# Patient Record
Sex: MALE | Race: BLACK OR AFRICAN AMERICAN | NOT HISPANIC OR LATINO | Employment: FULL TIME | URBAN - METROPOLITAN AREA
[De-identification: names, ages, dates, MRNs, and addresses within clinical notes are randomized per-mention and may not be internally consistent; named-entity substitution may affect disease eponyms.]

---
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
## 2018-01-10 NOTE — MISCELLANEOUS
Message   Recorded as Task   Date: 05/16/2016 09:38 AM, Created By: Milo Saeed   Task Name: Medical Complaint Callback   Assigned To: [facility] triage,Team   Regarding Patient: Suzy Miguel, Status: In Progress   Comment:   Lynnette Heredia - 16 May 2016 9:38 AM    TASK CREATED  Caller: Braeden Sams, Mother; Medical Complaint; [phone] [facility] Phone)  HOT; [place] Russian;   Misha Specking - 16 May 2016 9:44 AM    TASK IN PROGRESS   FergusonPapaIndigo - 16 May 2016 9:49 AM    TASK EDITED  Raquel Sam  May 5 2000  [id number]  Guardian: [ ]  [address] [facility], [address] [facility]       Complaint:  respiratory congestion  sore throat, feels warm, cough  Duration:   1-2 days   Severity: mild     Comments: No fever  Drinking and voiding well  Alert and less active  PCP: Frantz Dugan  Patient Guardian Would Like: Appointment     PROTOCOL: : Sore Throat - Pediatric Guideline     DISPOSITION: See Today or Tomorrow in [address] [place] [address] child seen     CARE ADVICE:   Appt made for today at pts request  Refused home care advise  Active Problems   1  Acanthosis nigricans (701 2) (L83)  2  ADHD (attention deficit hyperactivity disorder), predominantly hyperactive impulsive type   (314 01) (F90 1)  3  Allergic rhinitis (477 9) (J30 9)  4  Asthma (493 90) (J45 909)  5  Dislocation of proximal interphalangeal joint of left little finger, initial encounter (834 02)   (E82 953V)  6  Eczema (692 9) (L30 9)  7  Gastroesophageal reflux disease (530 81) (K21 9)  8  Gynecomastia, male (611 1) (N62)  9  Injury of little finger (959 5) (S69 90XA)  10  Insulin resistance (277 7) (E88 81)  11  Migraine headache (346 90) (G43 909)  12  Morbid or severe obesity due to excess calories (278 01) (E66 01)  13  Pain of finger of left hand (729 5) (M79 645)  14  Skin rash (782 1) (R21)  15  Stria (701 3) (L90 6)  16  Traumatic rupture of radial collateral ligament (848 9) (S53 20XA)  17   Vitamin D deficiency (268 9) (E55 9)    Current Meds  1  CloNIDine HCl - 0 3 MG Oral Tablet; Therapy: 21HHQ7407 to Recorded  2  Excedrin Migraine 250-250-65 MG Oral Tablet; two tablets PO daily as needed for   migraine headache; Therapy: 59TMR1400 to (Last Rx:31Mar2015)  Requested for: 97BNY2787 Ordered  3  Flovent  MCG/ACT Inhalation Aerosol; Inhale 2 puffs twice daily; Therapy: 24Gfy5459 to (Blanca Marquis)  Requested for: 86TKS1890; Last   Rx:02Nov2015 Ordered  4  Fluticasone Propionate 50 MCG/ACT Nasal Suspension (Flonase); 1 spray each nostril   1-2 times a day; Therapy: 91GXT8370 to (Evaluate:12Jan2016)  Requested for: 77GWQ1568; Last   Rx:79Usc9444; Status: ACTIVE - Renewal Denied Ordered  5  MetFORMIN HCl - 500 MG Oral Tablet; TAKE 2 TABLETS TWICE DAILY WITH MEALS; Therapy: 60Yci0513 to Recorded  6  Montelukast Sodium 10 MG Oral Tablet; TAKE 1 TABLET BY MOUTH ONCE A DAY; Therapy: 39Pcu4552 to (Evaluate:85Wnb7234)  Requested for: 26WFP1034; Last   Rx:27Jan2016; Status: ACTIVE - Renewal Denied Ordered  7  Ranitidine HCl - 300 MG Oral Capsule; TAKE 1 CAPSULE AT BEDTIME; Therapy: 36QLZ8939 to (Evaluate:21Apr2016)  Requested for: 65Xkm7789; Last   Rx:22Mar2016 Ordered  8  Ventolin  (90 Base) MCG/ACT Inhalation Aerosol Solution; INHALE 2 PUFFS   EVERY 4 HOURS AS NEEDED FOR COUGH AND WHEEZING; Therapy: 88IPT2016 to (Leonel Pascual)  Requested for: 21Apr2016; Last   Rx:29Oct2015; Status: ACTIVE - Renewal Denied Ordered  9  Vitamin D3 5000 UNIT Oral Capsule; take 1 capsule daily; Therapy: 63Esh5452 to (Last Rx:15Apr2016)  Requested for: 00Chl4430 Ordered  10  Vyvanse CAPS; Therapy: [phone] to Recorded  11  ZyrTEC Allergy 10 MG Oral Tablet; TAKE 1 TABLET AT BEDTIME; Therapy: 85Qfv8951 to (Evaluate:13Nov2015)  Requested for: 94Qjs8266; Last    Rx:48Gtd6694 Ordered    Allergies   1  Penicillin V Potassium SOLR   2  Animal dander - Cats  3  Animal dander - Dogs  4   Seasonal    Signatures   Electronically signed by : Mazin Rutledge RN; May 16 2016  9:54AM EST                       (Author)    Electronically signed by : Elvin Flynn, [facility]; May 16 2016  1:06PM EST                       (Author)

## 2018-01-11 NOTE — MISCELLANEOUS
Message   Recorded as Task   Date: 07/01/2016 11:15 AM, Created By: Doug Pepper   Task Name: Call Patient with results   Assigned To: Cayla Jacobs   Regarding Patient: Christopher Steele, Status: Active   Comment:    Juan De Souza - 01 Jul 2016 11:15 AM     Patient Phone: [phone]      Taskk to Jodi--US shows hepatosplenomegaly with slightly enlarged portal vein with appropriate direction of flow  Zenons Hussein - 01 Jul 2016 11:31 AM     TASK REASSIGNED: Previously Assigned To Jordan Adame - 06 Jul 2016 8:41 AM     TASK EDITED  SEE OTHER NOTE        Active Problems    1  Acanthosis nigricans (701 2) (L83)   2  Acute pharyngitis (462) (J02 9)   3  ADHD (attention deficit hyperactivity disorder), predominantly hyperactive impulsive type   (314 01) (F90 1)   4  Allergic rhinitis (477 9) (J30 9)   5  Asthma (493 90) (J45 909)   6  Dislocation of proximal interphalangeal joint of left little finger, initial encounter (834 02)   (F36 264A)   7  Eczema (692 9) (L30 9)   8  Gastroesophageal reflux disease (530 81) (K21 9)   9  Gynecomastia, male (611 1) (N62)   10  Injury of little finger (959 5) (S69 90XA)   11  Insulin resistance (277 7) (E88 81)   12  Irregular bowel habits (569 89) (R19 8)   13  Migraine headache (346 90) (G43 909)   14  Morbid or severe obesity due to excess calories (278 01) (E66 01)   15  Pain of finger of left hand (729 5) (M79 645)   16  Skin rash (782 1) (R21)   17  Stria (701 3) (L90 6)   18  Traumatic rupture of radial collateral ligament (848 9) (S53 20XA)   19  Vitamin D deficiency (268 9) (E55 9)    Current Meds   1  Adderall TABS; Therapy: (Recorded:08Jun2016) to Recorded   2  CloNIDine HCl - 0 3 MG Oral Tablet; Therapy: 12HMF0140 to Recorded   3  Excedrin Migraine 250-250-65 MG Oral Tablet; two tablets PO daily as needed for   migraine headache; Therapy: 31IKO0998 to (Last Rx:31Mar2015)  Requested for: 93YMA0154 Ordered   4   Flovent  MCG/ACT Inhalation Aerosol; Inhale 2 puffs twice daily; Therapy: 91Pzv8982 to (Jackelyn Cane)  Requested for: 93QOJ0706; Last   Rx:02Nov2015 Ordered   5  Fluticasone Propionate 50 MCG/ACT Nasal Suspension (Flonase); 1 spray each nostril   1-2 times a day; Therapy: 00QYH4801 to (Evaluate:12Jan2016)  Requested for: 30XHD7012; Last   Rx:15Wzl9573; Status: ACTIVE - Renewal Denied Ordered   6  Montelukast Sodium 10 MG Oral Tablet; TAKE 1 TABLET BY MOUTH ONCE A DAY; Therapy: 34Rml9219 to (Evaluate:06Ils9322)  Requested for: 97FOW0534; Last   Rx:06Jun2016 Ordered   7  Ranitidine HCl - 300 MG Oral Capsule; TAKE 1 CAPSULE AT BEDTIME; Therapy: 12UCF5959 to (Evaluate:21Apr2016)  Requested for: 47QTA0476; Last   Rx:22Mar2016 Ordered   8  Ventolin  (90 Base) MCG/ACT Inhalation Aerosol Solution; INHALE 2 PUFFS   EVERY 4 HOURS AS NEEDED FOR COUGH AND WHEEZING; Therapy: 16GDX8353 to (Jefferson Robledo)  Requested for: 21Apr2016; Last   Rx:29Oct2015; Status: ACTIVE - Renewal Denied Ordered   9  Vitamin D3 5000 UNIT Oral Capsule; take 1 capsule daily; Therapy: 15Apr2016 to (Last Rx:15Apr2016)  Requested for: 23Jun2016 Ordered   10  Vyvanse CAPS; Therapy: ([phone]) to Recorded   11  ZyrTEC Allergy 10 MG Oral Tablet; TAKE 1 TABLET AT BEDTIME; Therapy: 75Iug8806 to (Evaluate:13Nov2015)  Requested for: 99Gsy1620; Last    Rx:16Pxe1484 Ordered    Allergies    1  Penicillin V Potassium SOLR    2  Animal dander - Cats   3  Animal dander - Dogs   4   Seasonal    Signatures   Electronically signed by : Emre Cardona, ; Jul 6 2016  8:42AM EST                       (Author)

## 2018-01-11 NOTE — MISCELLANEOUS
Message   Recorded as Task   Date: 08/19/2016 09:36 AM, Created By: Kalyn Burns   Task Name: Medical Complaint Callback   Assigned To: [facility] triage,Team   Regarding Patient: Nicolas Workman, Status: In Progress   ChikiGracie Lockett - 19 Aug 2016 9:36 AM     TASK CREATED  Caller: Morris Orozco, Mother; Medical Complaint; [phone] [address] LEFT 07/04/2016 AND RETURNED 07/24/2016 AND SINCE HES BEEN HOME HES BEEN HAVING NON STOP DIARRHEA AND MOM SAID IS DEHYDRATED-   Horn,April - 19 Aug 2016 9:37 AM     TASK IN PROGRESS   Horn,April - 19 Aug 2016 9:39 AM     TASK EDITED  Diarrhea since arrival back from [facility] on 7/24/16  Pt  is drinking fluids to stay hydrated  Mom says he is dehydrated  Told mom to bring him to ED in the case he would need an IV/Fluids  Active Problems   1  Acanthosis nigricans (701 2) (L83)  2  Acute pharyngitis (462) (J02 9)  3  ADHD (attention deficit hyperactivity disorder), predominantly hyperactive impulsive type   (314 01) (F90 1)  4  Allergic rhinitis (477 9) (J30 9)  5  Asthma (493 90) (J45 909)  6  Dislocation of proximal interphalangeal joint of left little finger, initial encounter (834 02)   (S47 796P)  7  Eczema (692 9) (L30 9)  8  Gastroesophageal reflux disease (530 81) (K21 9)  9  Gynecomastia, male (611 1) (N62)  10  Injury of little finger (959 5) (S69 90XA)  11  Insulin resistance (277 7) (E88 81)  12  Irregular bowel habits (569 89) (R19 8)  13  Migraine headache (346 90) (G43 909)  14  Morbid or severe obesity due to excess calories (278 01) (E66 01)  15  Pain of finger of left hand (729 5) (M79 645)  16  Skin rash (782 1) (R21)  17  Stria (701 3) (L90 6)  18  Traumatic rupture of radial collateral ligament (848 9) (S53 20XA)  19  Vitamin D deficiency (268 9) (E55 9)    Current Meds  1  Adderall TABS; Therapy: (Recorded:08Jun2016) to Recorded  2  CloNIDine HCl - 0 3 MG Oral Tablet; Therapy: 46IEF8202 to Recorded  3  Excedrin Migraine 250-250-65 MG Oral Tablet; two tablets PO daily as needed for   migraine headache; Therapy: 49XPM3292 to (Last Rx:31Mar2015)  Requested for: 84MGA5812 Ordered  4  Flovent  MCG/ACT Inhalation Aerosol; Inhale 2 puffs twice daily; Therapy: 43Edy6447 to (Micaela Kansas)  Requested for: 63MNO5750; Last   Rx:02Nov2015 Ordered  5  Fluticasone Propionate 50 MCG/ACT Nasal Suspension (Flonase); 1 spray each nostril   1-2 times a day; Therapy: 34MAL2686 to (Evaluate:12Jan2016)  Requested for: 57MDF6282; Last   Rx:80Kkd0140; Status: ACTIVE - Renewal Denied Ordered  6  Montelukast Sodium 10 MG Oral Tablet; TAKE 1 TABLET BY MOUTH ONCE A DAY; Therapy: 53Qzh4296 to (Evaluate:97Kaf7422)  Requested for: 01Zqe3165; Last   Rx:51Szn1413 Ordered  7  Ranitidine HCl - 300 MG Oral Capsule; TAKE 1 CAPSULE AT BEDTIME; Therapy: 96DFS6165 to (Evaluate:80Zzf3928)  Requested for: 25Fnq5914; Last   Rx:12Vod5093 Ordered  8  Ventolin  (90 Base) MCG/ACT Inhalation Aerosol Solution; INHALE 2 PUFFS   EVERY 4 HOURS AS NEEDED FOR COUGH AND WHEEZING; Therapy: 97TVC4441 to (Kyung Barron)  Requested for: 21Apr2016; Last   Rx:29Oct2015; Status: ACTIVE - Renewal Denied Ordered  9  Vitamin D3 5000 UNIT Oral Capsule; take 1 capsule daily; Therapy: 41Qvd8039 to (Last Rx:15Apr2016)  Requested for: 23Jun2016 Ordered  10  Vyvanse CAPS; Therapy: [phone] to Recorded  11  ZyrTEC Allergy 10 MG Oral Tablet; TAKE 1 TABLET AT BEDTIME; Therapy: 44Yhy1818 to (Evaluate:13Nov2015)  Requested for: 62Wjp8685; Last    Rx:64Wav7840 Ordered    Allergies   1  Penicillin V Potassium SOLR   2  Animal dander - Cats  3  Animal dander - Dogs  4   Seasonal    Signatures   Electronically signed by : SHRUTI Quinteros ; Aug 19 2016 10:17AM EST                       (Author)    Electronically signed by : Junior Eugene, ; Aug 22 2016  8:05AM EST                       (Author)

## 2018-01-11 NOTE — RESULT NOTES
Message   Task to [facility]  See me  Verified Results  (1) TISSUE EXAM [id number] 02:28PM Eamon De Souza     Test Name Result Flag Reference   LAB AP CASE REPORT (Report)     Surgical Pathology Report             Case: [id number]                   Authorizing Provider: Cale Anthony MD     Collected:      06/14/2016 1428        Ordering Location:   [address] [place] [address]   Received:      06/15/2016 [address] [facility]                               Pathologist:      Jamaal Giraldo MD                              Specimens:  A) - Stomach, antrum bx                                        B) - Esophagus, esophagus bx   LAB AP FINAL DIAGNOSIS (Report)     A  Gastric antrum biopsy:  - Chronic inactive oxyntic gastritis, negative for curvilinear   Helicobacter pylori, confirmed by immunohistochemical stains, evaluated   with appropriate positive & negative controls  - No atrophy or intestinal metaplasia identified   - No epithelial dysplasia and no evidence of malignancy  B  Esophagus biopsy:  - Non-diagnostic biopsy - specimen consists of a minute portion of mucus   within which detached, benign squamous & a strip of cardiac-type columnar   epithelial cells, without evidence of dysplasia or goblet cell   (intestinal) metaplasia  Few neutrophils & lymphocytes are admixed,   without eosinophils  Interpretation performed at [facility]  Electronically signed by Jamaal Giraldo MD on 6/17/2016 at 11:28 AM   LAB AP SURGICAL ADDITIONAL INFORMATION (Report)     These tests were developed and their performance characteristics   determined by [facility] or [employer]  They may not be cleared or approved by the [place]  Food and   Drug Administration  The FDA has determined that such clearance or   approval is not necessary  These tests are used for clinical purposes     They should not be regarded as investigational or for research  This   laboratory has been approved by [facility], designated as a high-complexity   laboratory and is qualified to perform these tests  LAB AP GROSS DESCRIPTION (Report)     A  The specimen is received in formalin, labeled with the patient's name   and hospital number, and is designated antrum biopsy  The specimen   consists of a single tan soft tissue fragment measuring 0 7 cm  Entirely   submitted  One cassette  B  The specimen is received in formalin, labeled with the patient's name   and hospital number, and is designated esophagus biopsy  The specimen   consists of 2 white-tan soft tissue fragments each measuring 0 1-0 2 cm  Entirely submitted  One cassette  Note: The estimated total formalin fixation time based upon information   provided by the submitting clinician and the standard processing schedule   is 38 0 hours   [facility]    LAB AP CLINICAL INFORMATION      Gastroesophageal reflux disease, Normal antrum   Normal antrum       Signatures   Electronically signed by : SHRUTI Pritchard ; Jun 19 2016  9:18PM EST                       (Author)

## 2018-01-11 NOTE — MISCELLANEOUS
Message   Recorded as Task   Date: 09/06/2016 09:35 AM, Created By: Gregoria Hopson   Task Name: Medical Complaint Callback   Assigned To: Vidya Wei   Regarding Patient: Darwin Freeman, Status: Active   Comment:    Sindhu Oquendo - 06 Sep 2016 9:35 AM     TASK CREATED  Caller: Josee Davis, Mother; Medical Complaint; [phone]  concern pt having diarrhea  please advise  thank you   Cayla Jacobs - 06 Sep 2016 2:06 PM     TASK EDITED   mom states he was here on the 19th and had diarrhea s/s but resolved and now is back the past 2 days and she said it is so bad that he is not sleeping at night and did not go to school today  she said he is taking the probiotic and bentyl but not helping  she said he had a fever yesterday but did not take a temp  see phone call today from pcp  Vidya Wei - 06 Sep 2016 2:29 PM     TASK REPLIED TO: Previously Assigned To Vidya Wei  looks like he no-showed for appt this morning at PCP  If he had a fever then most likely viral   Cayla Jacobs - 06 Sep 2016 5:01 PM     TASK REASSIGNED: Previously Assigned To Cayla Jacobs     HE DID NOT HAVE AN APPT AT PCP  THEY TOLD HIM THEY DID NOT KNOW WHAT TO DO AND TO SEE US  INSTRUCTED MOM THAT IT SOUNDS VIRAL SINCE HE HAD A FEVER  MAKE SURE HE IS HYDRATED AND IF NOT BETTER BY END OF WEEK TO CALL US        Active Problems    1  Abdominal pain, diffuse (789 00) (R10 84)   2  Acanthosis nigricans (701 2) (L83)   3  Acute diarrhea (787 91) (R19 7)   4  Acute pharyngitis (462) (J02 9)   5  ADHD (attention deficit hyperactivity disorder), predominantly hyperactive impulsive type   (314 01) (F90 1)   6  Allergic rhinitis (477 9) (J30 9)   7  Asthma (493 90) (J45 909)   8  Dislocation of proximal interphalangeal joint of left little finger, initial encounter (834 02)   (R54 190S)   9  Eczema (692 9) (L30 9)   10  Gastroesophageal reflux disease (530 81) (K21 9)   11  Gynecomastia, male (611 1) (N62)   15   Hepatosplenomegaly (571 8) (R16 2) 13  Injury of little finger (959 5) (S69 90XA)   14  Insulin resistance (277 7) (E88 81)   15  Irregular bowel habits (569 89) (R19 8)   16  Migraine headache (346 90) (G43 909)   17  Morbid or severe obesity due to excess calories (278 01) (E66 01)   18  Nonalcoholic fatty liver disease (571 8) (K76 0)   19  Pain of finger of left hand (729 5) (M79 645)   20  Skin rash (782 1) (R21)   21  Stria (701 3) (L90 6)   22  Traumatic rupture of radial collateral ligament (848 9) (S53 20XA)   23  Vitamin D deficiency (268 9) (E55 9)    Current Meds   1  Adderall TABS; Therapy: (Recorded:08Jun2016) to Recorded   2  CloNIDine HCl - 0 3 MG Oral Tablet; Therapy: 39KSH7571 to Recorded   3  Dicyclomine HCl - 20 MG Oral Tablet; TAKE 1 TABLET 3 TIMES DAILY AS NEEDED FOR   ABDOMINAL CRAMPING AND PAIN;   Therapy: 36Gda9562 to (Evaluate:22Oct2016)  Requested for: 95Ryx9898; Last   Rx:88Xrv8433 Ordered   4  Excedrin Migraine 250-250-65 MG Oral Tablet; two tablets PO daily as needed for   migraine headache; Therapy: 80FMX6835 to (Last Rx:31Mar2015)  Requested for: 54DPH6226 Ordered   5  Zovonfzo8Kebr Oral Capsule; take 1 tab daily; Therapy: 52LTQ3438 to (Evaluate:18Oct2016)  Requested for: 45JGD1962; Last   Rx:90Zht0802 Ordered   6  Flovent  MCG/ACT Inhalation Aerosol; Inhale 2 puffs twice daily; Therapy: 94Hjx0782 to (Otto Ayala)  Requested for: 23HKJ9195; Last   Rx:02Nov2015 Ordered   7  Fluticasone Propionate 50 MCG/ACT Nasal Suspension (Flonase); 1 spray each nostril   1-2 times a day; Therapy: 64CTY0711 to (Evaluate:12Jan2016)  Requested for: 37QQV6172; Last   Rx:60Iqw0997; Status: ACTIVE - Renewal Denied Ordered   8  Montelukast Sodium 10 MG Oral Tablet; TAKE 1 TABLET BY MOUTH ONCE A DAY; Therapy: 02Onq7202 to (Evaluate:41Odi9357)  Requested for: 54Hgs2142; Last   Rx:19Mxh3058 Ordered   9  Ranitidine HCl - 300 MG Oral Capsule; TAKE 1 CAPSULE AT BEDTIME;    Therapy: 11JHU4273 to (Evaluate:15Lvj3298) Requested for: 86Mkv0018; Last   Rx:76Dfi6432 Ordered   10  Ventolin  (90 Base) MCG/ACT Inhalation Aerosol Solution; INHALE 2 PUFFS    EVERY 4 HOURS AS NEEDED FOR COUGH AND WHEEZING; Therapy: 64USD9564 to (Ples Mcburney)  Requested for: 21Apr2016; Last    Rx:36Ddl5589; Status: ACTIVE - Renewal Denied Ordered   11  Vitamin D3 5000 UNIT Oral Capsule; take 1 capsule daily; Therapy: 37Jev8492 to (Last Rx:15Apr2016)  Requested for: 23Jun2016 Ordered   12  Vyvanse CAPS; Therapy: [phone]) to Recorded   13  ZyrTEC Allergy 10 MG Oral Tablet; TAKE 1 TABLET AT BEDTIME; Therapy: 62Vxi8377 to (Evaluate:13Nov2015)  Requested for: 72Wpb2961; Last    Rx:49Stm0695 Ordered    Allergies    1  Penicillin V Potassium SOLR    2  Animal dander - Cats   3  Animal dander - Dogs   4   Seasonal    Signatures   Electronically signed by : Steph Myles; Sep  7 2016  8:36AM EST                       (Author)

## 2018-01-11 NOTE — MISCELLANEOUS
Message   Recorded as Task   Date: 09/06/2016 08:43 AM, Created By: Jacinto Stewart   Task Name: Medical Complaint Callback   Assigned To: kc claude triage,Team   Regarding Patient: Jeana Mesa, Status: In Progress   Leonverna Merino - 06 Sep 2016 8:43 AM     TASK CREATED  Caller: Zenobia Moreira; Medical Complaint; [phone]  DIARRHEA   Horn,April - 06 Sep 2016 8:47 AM     TASK IN PROGRESS   Horn,April - 06 Sep 2016 8:55 AM     TASK EDITED  Diarrhea since July, after returning home from [place] on July 24  No vomiting or fever  He did notice some blood in stool  Did see GI and diarrhea did get better but is getting worse again  Scheduled appt  in the [facility]  office on Tuesday 9/6/16 at 0950AM       PROTOCOL: : Diarrhea- Pediatric Guideline     DISPOSITION:  Home Care - Mild to moderate diarrhea, probably viral gastroenteritis     CARE ADVICE:       1 REASSURANCE AND EDUCATION: * Most diarrhea is caused by a viral infection of the intestines  * Bacterial infections as a cause of diarrhea are uncommon  * Diarrhea is the bodyway of getting rid of the germs  * Here are some tips on how to keep ahead of fluid losses  1 UNIVERSAL PRECAUTIONS IN ALL COUNTRIES:* Hand washing is the key to preventing the spread of infections  Always wash the hands after any contact with vomit or stools  * Wash hands after using the toilet  * Wash hands before cooking, eating food, handling babies and feeding young children  * Cook all poultry thoroughly  Never serve chicken that is still pink inside  Reason: Undercooked poultry is a common cause of diarrhea  3  MILD DIARRHEA TREATMENT (OVER 3YEAR OLD) - EXTRA FLUIDS AND REGULAR DIET:* Continue regular diet  * Eat more starchy foods (e g , cereal, crackers, rice)  * Drink more fluids  Milk is a good choice for mild diarrhea  (Exception: avoid all fruit juices and soft drinks because they make diarrhea worse)  (Reason: high osmotic load)     7 FREQUENT, WATERY DIARRHEA IN OLDER CHILDREN (OVER 3YEAR OLD) - GIVE MORE FLUIDS: * FLUIDS: Offer unlimited fluids  If taking solids, give water or half-strength Gatorade  If refuses solids, give milk or formula  * Avoid all fruit juices and soft drinks  (Reason: makes diarrhea worse)* ORS is rarely needed, but for severe diarrhea, also give 4-8 ounces (120-240 ml) of ORS after every large watery stool  ORS is an Oral Rehydration Solution  Sarah special fluid that can help your child stay hydrated  You can use Pedialyte or the store brand  It can be bought in food or drug stores  * SOLIDS: Starchy foods are absorbed best  Give dried cereals, oatmeal, bread, crackers, noodles, mashed potatoes, rice, etc  Pretzels or salty crackers can help meet sodium needs  Return to normal diet in 24 hours  9 PROBIOTICS:* Probiotics contain healthy bacteria (Lactobacilli) that can replace unhealthy bacteria in the GI tract  * YOGURT in the easiest source of probiotics  If over 12 months, give 2 to 6 ounces (60 to 180 ml) of yogurt twice daily  (Note: Today, almost all yogurts are cultureYogurts that are lactose-free may be even more helpful  * Probiotic supplements in liquids, granules, tablets or capsules are also available in health food stores  13  EXPECTED COURSE:* Viral diarrhea lasts 5-14 days  * Severe diarrhea only occurs on the first 1 or 2 days, but loose stools can persist for 1 to 2 weeks  14  CALL BACK IF:* Signs of dehydration occur* Blood appears in the stool* Diarrhea persists over 2 weeks* Your child becomes worse   Spoke with patient  He will call  to schedule an appt  and go from there  1        1 Amended By: Jack Khoury, April; Sep 06 2016 9:15 AM EST    Active Problems   1  Abdominal pain, diffuse (789 00) (R10 84)  2  Acanthosis nigricans (701 2) (L83)  3  Acute diarrhea (787 91) (R19 7)  4  Acute pharyngitis (462) (J02 9)  5  ADHD (attention deficit hyperactivity disorder), predominantly hyperactive impulsive type   (314 01) (F90 1)  6   Allergic rhinitis (477 9) (J30 9)  7  Asthma (493 90) (J45 909)  8  Dislocation of proximal interphalangeal joint of left little finger, initial encounter (834 02)   (Y64 361Z)  9  Eczema (692 9) (L30 9)  10  Gastroesophageal reflux disease (530 81) (K21 9)  11  Gynecomastia, male (611 1) (N62)  15  Hepatosplenomegaly (571 8) (R16 2)  13  Injury of little finger (959 5) (S69 90XA)  14  Insulin resistance (277 7) (E88 81)  15  Irregular bowel habits (569 89) (R19 8)  16  Migraine headache (346 90) (G43 909)  17  Morbid or severe obesity due to excess calories (278 01) (E66 01)  18  Nonalcoholic fatty liver disease (571 8) (K76 0)  19  Pain of finger of left hand (729 5) (M79 645)  20  Skin rash (782 1) (R21)  21  Stria (701 3) (L90 6)  22  Traumatic rupture of radial collateral ligament (848 9) (S53 20XA)  23  Vitamin D deficiency (268 9) (E55 9)    Current Meds  1  Adderall TABS; Therapy: (Recorded:08Jun2016) to Recorded  2  CloNIDine HCl - 0 3 MG Oral Tablet; Therapy: 64QMG6543 to Recorded  3  Dicyclomine HCl - 20 MG Oral Tablet; TAKE 1 TABLET 3 TIMES DAILY AS NEEDED FOR   ABDOMINAL CRAMPING AND PAIN;   Therapy: 10Wlo5137 to (Evaluate:22Oct2016)  Requested for: 45Fte6036; Last   Rx:89Hka6896 Ordered  4  Excedrin Migraine 250-250-65 MG Oral Tablet; two tablets PO daily as needed for   migraine headache; Therapy: 79PJZ7608 to (Last Rx:31Mar2015)  Requested for: 34AWE3531 Ordered  5  Golvaekj2Dtjg Oral Capsule; take 1 tab daily; Therapy: 66OVF9290 to (Evaluate:18Oct2016)  Requested for: 82GKA2025; Last   Rx:26Tfj9512 Ordered  6  Flovent  MCG/ACT Inhalation Aerosol; Inhale 2 puffs twice daily; Therapy: 90Mke3368 to ([facility])  Requested for: 55JUZ2847; Last   Rx:02Nov2015 Ordered  7  Fluticasone Propionate 50 MCG/ACT Nasal Suspension (Flonase); 1 spray each nostril   1-2 times a day;    Therapy: 70WYB8082 to (Evaluate:12Jan2016)  Requested for: 39QYD2563; Last   Rx:14Sep2015; Status: ACTIVE - Renewal Denied Ordered  8  Montelukast Sodium 10 MG Oral Tablet; TAKE 1 TABLET BY MOUTH ONCE A DAY; Therapy: 51Tez7883 to (Evaluate:98Uir6925)  Requested for: 18Mdj7587; Last   Rx:98Kee7042 Ordered  9  Ranitidine HCl - 300 MG Oral Capsule; TAKE 1 CAPSULE AT BEDTIME; Therapy: 06RYG7562 to (Evaluate:24Tsi0353)  Requested for: 79YJM5891; Last   Rx:08Psu8108 Ordered  10  Ventolin  (90 Base) MCG/ACT Inhalation Aerosol Solution; INHALE 2 PUFFS    EVERY 4 HOURS AS NEEDED FOR COUGH AND WHEEZING; Therapy: 54KZF0544 to (Grace Banegas)  Requested for: 05Yvj1598; Last    Rx:91Xjo9283; Status: ACTIVE - Renewal Denied Ordered  11  Vitamin D3 5000 UNIT Oral Capsule; take 1 capsule daily; Therapy: 86Ndv9200 to (Last Rx:74Nab0316)  Requested for: 22Ruv4305 Ordered  12  Vyvanse CAPS; Therapy: (256 3408) to Recorded  13  ZyrTEC Allergy 10 MG Oral Tablet; TAKE 1 TABLET AT BEDTIME; Therapy: 49Jmf8178 to (Evaluate:58Psl5427)  Requested for: 11Dhf2278; Last    Rx:69Cpf3281 Ordered    Allergies   1  Penicillin V Potassium SOLR   2  Animal dander - Cats  3  Animal dander - Dogs  4   Seasonal    Signatures   Electronically signed by : Tayo Smith, ; Sep  6 2016  9:16AM EST                       (Author)

## 2018-01-12 NOTE — MISCELLANEOUS
Message   Recorded as Task   Date: 07/05/2016 12:59 PM, Created By: Felicity Alvarenga   Task Name: Call Patient with results   Assigned To: Cayla Jacobs   Regarding Patient: Judi Marquez, Status: Active   Comment:    Juan De Souza - 05 Jul 2016 12:59 PM     Patient Phone: [phone]      Task to Priscila Baba MCKEON shouws hepatosplenomegaly but no portal hypertension  Severo Ivanolilibeth - 05 Jul 2016 1:51 PM     TASK REASSIGNED: Previously Assigned To Akua Montero - 05 Jul 2016 2:58 PM     TASK REASSIGNED: Previously Assigned To Cayla Jacobs  Is there any concern to relay to mom when I call with results? Juan De Souza - 05 Jul 2016 8:38 PM     TASK REPLIED TO: Previously Assigned To Juan De Souza   This is reassuring  Cayla Jacobs - 06 Jul 2016 8:41 AM     TASK EDITED  LM FOR PT TO RETURN Steve Howell - 07 Jul 2016 9:57 AM     TASK EDITED  patient in [facility]  Aware of US results        Active Problems    1  Acanthosis nigricans (701 2) (L83)   2  Acute pharyngitis (462) (J02 9)   3  ADHD (attention deficit hyperactivity disorder), predominantly hyperactive impulsive type   (314 01) (F90 1)   4  Allergic rhinitis (477 9) (J30 9)   5  Asthma (493 90) (J45 909)   6  Dislocation of proximal interphalangeal joint of left little finger, initial encounter (834 02)   (S09 768E)   7  Eczema (692 9) (L30 9)   8  Gastroesophageal reflux disease (530 81) (K21 9)   9  Gynecomastia, male (611 1) (N62)   10  Injury of little finger (959 5) (S69 90XA)   11  Insulin resistance (277 7) (E88 81)   12  Irregular bowel habits (569 89) (R19 8)   13  Migraine headache (346 90) (G43 909)   14  Morbid or severe obesity due to excess calories (278 01) (E66 01)   15  Pain of finger of left hand (729 5) (M79 645)   16  Skin rash (782 1) (R21)   17  Stria (701 3) (L90 6)   18  Traumatic rupture of radial collateral ligament (848 9) (S53 20XA)   19   Vitamin D deficiency (268 9) (E55 9)    Current Meds   1  Adderall TABS; Therapy: (Recorded:08Jun2016) to Recorded   2  CloNIDine HCl - 0 3 MG Oral Tablet; Therapy: 66BKW9662 to Recorded   3  Excedrin Migraine 250-250-65 MG Oral Tablet; two tablets PO daily as needed for   migraine headache; Therapy: 50OEU4776 to (Last Rx:31Mar2015)  Requested for: 42UOG0138 Ordered   4  Flovent  MCG/ACT Inhalation Aerosol; Inhale 2 puffs twice daily; Therapy: 32Vlz4639 to (Jeffery Gloria)  Requested for: 66KJJ4931; Last   Rx:02Nov2015 Ordered   5  Fluticasone Propionate 50 MCG/ACT Nasal Suspension (Flonase); 1 spray each nostril   1-2 times a day; Therapy: 98PWU6253 to (Evaluate:12Jan2016)  Requested for: 74AJX8884; Last   Rx:82Nme5519; Status: ACTIVE - Renewal Denied Ordered   6  Montelukast Sodium 10 MG Oral Tablet; TAKE 1 TABLET BY MOUTH ONCE A DAY; Therapy: 23Afv1395 to (Evaluate:61Wys9917)  Requested for: 87BRI1765; Last   Rx:06Jun2016 Ordered   7  Ranitidine HCl - 300 MG Oral Capsule; TAKE 1 CAPSULE AT BEDTIME; Therapy: 32UEZ8849 to (Evaluate:21Apr2016)  Requested for: 81WIJ5747; Last   Rx:22Mar2016 Ordered   8  Ventolin  (90 Base) MCG/ACT Inhalation Aerosol Solution; INHALE 2 PUFFS   EVERY 4 HOURS AS NEEDED FOR COUGH AND WHEEZING; Therapy: 63YFK0974 to (Courtney Shields)  Requested for: 21Apr2016; Last   Rx:29Oct2015; Status: ACTIVE - Renewal Denied Ordered   9  Vitamin D3 5000 UNIT Oral Capsule; take 1 capsule daily; Therapy: 52Zrp0812 to (Last Rx:15Apr2016)  Requested for: 23Jun2016 Ordered   10  Vyvanse CAPS; Therapy: (159 980 591) to Recorded   11  ZyrTEC Allergy 10 MG Oral Tablet; TAKE 1 TABLET AT BEDTIME; Therapy: 63Iiq7874 to (Evaluate:13Nov2015)  Requested for: 69Bog4479; Last    Rx:33Ewj7255 Ordered    Allergies    1  Penicillin V Potassium SOLR    2  Animal dander - Cats   3  Animal dander - Dogs   4   Seasonal    Signatures   Electronically signed by : Breana Pavon, ; Jul 7 2016  9:57AM EST (Author)

## 2018-01-12 NOTE — CONSULTS
I had the pleasure of evaluating your patient, dArian Powers  My full evaluation follows:      Chief Complaint  Obesity, insulin resistance, enlarged liver      History of Present Illness  Christal is a 68-year-old who was seen in follow-up after a four-month interval for her obesity, hepatosplenomegaly with fatty liver, esophageal reflux, and hyperinsulinemia  Over the past 4 months he has been a limited excess sugars from the diet and has tried to exercise  His abdominal ultrasound revealed improvement with decreased size of the liver  His cholesterol and triglycerides were normal as was his hemoglobin A1c and thyroid studies  His insulin however was 92 8  Today we discussed that his pancreas is really working over time to keep his sugars and check  He reported that his mother has type 2 diabetes and there are a lot of grandparents with diabetes as well  He has 2 grandparents that became blind due to the diabetes, one has passed away  I recommended that he follow up with endocrinology  He has continued to take ranitidine and dicyclomine and has been feeling quite well recently  Review of Systems    Constitutional: recent 4 lb weight gain, but as noted in HPI, not feeling poorly and not feeling tired  ENT: no nasal discharge  Cardiovascular: no chest pain and the heart rate was not fast    Respiratory: no cough  Gastrointestinal: Occasional stomach cramping, but as noted in HPI, no abdominal pain, no nausea, no vomiting, no constipation and no diarrhea  Neurological: no headache  Other Symptoms: Graduated high school and 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  ROS reviewed  Active Problems    1  Acanthosis nigricans (701 2) (L83)   2  ADHD (attention deficit hyperactivity disorder), predominantly hyperactive impulsive type   (314 01) (F90 1)   3  Allergic rhinitis (477 9) (J30 9)   4  Asthma (493 90) (J45 909)   5  Back pain (724 5) (M54 9)   6  Daytime somnolence (780 54) (R40 0)   7  Eczema (692 9) (L30 9)   8  Encounter for screening examination for sexually transmitted disease (V74 5) (Z11 3)   9  Excessive cerumen in both ear canals (380 4) (H61 23)   10  Gastroesophageal reflux disease (530 81) (K21 9)   11  Gynecomastia, male (611 1) (N62)   15  Hepatosplenomegaly (571 8) (R16 2)   13  Hyperinsulinemia (251 1) (E16 1)   14  Insulin resistance (277 7) (E88 81)   15  Irregular bowel habits (569 89) (R19 8)   16  Irritable bowel syndrome with diarrhea (564 1) (K58 0)   17  Migraine, unspecified, not intractable, without status migrainosus (346 90) (G43 909)   18  Morbid or severe obesity due to excess calories (278 01) (E66 01)   19  Nonalcoholic fatty liver disease (571 8) (K76 0)   20  Prediabetes (790 29) (R73 09)   21  Skin rash (782 1) (R21)   22  Snoring (786 09) (R06 83)   23  Stria (701 3) (L90 6)   24   Vitamin D deficiency (268 9) (E55 9)    Past Medical History    · History of Abdominal abscess (567 22) (K65 1)   · History of Abdominal pain, diffuse (789 00) (R10 84)   · History of Acute diarrhea (787 91) (R19 7)   · Asthma (493 90) (J45 909)   · History of Birth History   · History of Cough (786 2) (R05)   · History of Dislocation of proximal interphalangeal joint of left little finger, initial encounter  (834 02) (Q50 984V)   · History of Headache (784 0) (R51)   · History of acute pharyngitis (V12 69) (Z87 09)   · History of allergy (V15 09) (Z88 9)   · History of upper respiratory infection (V12 09) (Z87 09)   · History of Injury of little finger (959 5) (S69 90XA)   · History of Pain of finger of left hand (729 5) (M79 645)   · History of Traumatic rupture of radial collateral ligament (848 9) (S53 20XA)   · History of Umbilical hernia (405 4) (K42 9)   · History of Vomiting (787 03) (R11 10)    Surgical History    · History of Elective Circumcision   · History of Esophagogastroduodenoscopy With Biopsy   · History of Tonsillectomy With Adenoidectomy   · History of Umbilical Hernia Repair    Family History    · Family history of Asthma (V17 5)   · Family history of Diabetes Mellitus (V18 0)   · Family history of migraine headaches (V17 2) (Z82 0)   · Family history of Gastric Surgery For Morbid Obesity Gastric Bypass   · Family history of Glaucoma (V19 11)   · Family history of Hypertension (V17 49)    · Family history of Blindness due to type 2 diabetes mellitus   · Family history of Type 2 Diabetes Mellitus    · Family history of Type 2 Diabetes Mellitus    · Family history of cataracts (V19 19) (Z83 518)   · Family history of Primary cervical cancer    Social History    · Denied: History of Alcohol Use (History)   · Caffeine Use   · Cultural Background  (___ %)   · Currently In School   · Denied: History of Drug Use   · Lives with mother (single parent)   · Marital History - Single   · Never A Smoker   · No alcohol use   · Non-smoker (V49 89) (Z78 9)   · Preferred Language English  The social history was reviewed and updated today  Current Meds   1  CloNIDine HCl - 0 3 MG Oral Tablet; Therapy: 13FNT1924 to Recorded   2  Dicyclomine HCl - 20 MG Oral Tablet; TAKE 1 TABLET 3 TIMES DAILY AS NEEDED FOR   ABDOMINAL CRAMPING AND PAIN;   Therapy: 79Axa6868 to (Evaluate:11Aug2017)  Requested for: 37Wbj2844; Last   Rx:82Dtq6098 Ordered   3  Excedrin Migraine 250-250-65 MG Oral Tablet; two tablets PO daily as needed for   migraine headache; Therapy: 14PRL8389 to (Last Rx:31Mar2015)  Requested for: 65SIO7173 Ordered   4  Flovent  MCG/ACT Inhalation Aerosol; Inhale 2 puffs twice daily; Therapy: 48Fnr4047 to (Pancho Licona)  Requested for: 25TIN8110; Last   Rx:02Hdx5037 Ordered   5  Fluticasone Propionate 50 MCG/ACT Nasal Suspension; 1 spray each nostril 1-2 times   a day; Therapy: 25FPK4954 to (Evaluate:24Jan2017)  Requested for: 29ENB6022; Last   Rx:12Osm8041 Ordered   6   Montelukast Sodium 10 MG Oral Tablet; TAKE 1 TABLET BY MOUTH ONCE A DAY; Therapy: 54Sdy5503 to (Evaluate:67Xeb9496)  Requested for: 58Sfm3534; Last   Rx:12Wbj3474 Ordered   7  RaNITidine HCl - 300 MG Oral Capsule; TAKE 1 CAPSULE AT BEDTIME, may take   another in morning as needed for heartburn; Therapy: 96DJG7623 to ([place] Cool)  Requested for: 73TQU7386; Last   Rx:34Amp7867 Ordered   8  Ventolin  (90 Base) MCG/ACT Inhalation Aerosol Solution; INHALE 2 PUFFS   EVERY 4 HOURS AS NEEDED FOR COUGH AND WHEEZING; Therapy: 76WXP5922 to (Evaluate:23Ilj0973)  Requested for: 96IVM7148; Last   Rx:58Ovg8100 Ordered   9  Vitamin D3 5000 UNIT Oral Capsule; take 1 capsule daily; Therapy: 16Ltn5474 to (Last Rx:15Qsd5270)  Requested for: 86Jlx4918 Ordered   10  Vyvanse 30 MG Oral Capsule; Therapy: (Recorded:36Pog9892) to Recorded   11  ZyrTEC Allergy 10 MG Oral Tablet; TAKE 1 TABLET AT BEDTIME; Therapy: 17Igi2706 to (Evaluate:58Aiu0125)  Requested for: 46Cax2124; Last    Rx:81Yul6941 Ordered    The medication list was reviewed and updated today  Allergies    1  Penicillin V Potassium SOLR    2  Animal dander - Cats   3  Animal dander - Dogs   4  Seasonal    Vitals   Recorded: 13Sep2017 02:41PM   Heart Rate 88   Systolic 701   Diastolic 72   Height 961 9 cm   Weight 145 3 kg   BMI Calculated 42 18   BSA Calculated 2 63   BMI Percentile 99 %   2-20 Stature Percentile 89 %   2-20 Weight Percentile 99 %     Physical Exam    Constitutional   General appearance: Abnormal   morbidly obese  Eyes   Conjunctiva and lids: No swelling, erythema, or discharge  Pupils and irises: Equal, round and reactive to light  Ears, Nose, Mouth, and Throat   External inspection of ears and nose: Normal     Nasal mucosa, septum, and turbinates: Normal without edema or erythema  Pulmonary   Respiratory effort: No increased work of breathing or signs of respiratory distress  Auscultation of lungs: Clear to auscultation      Cardiovascular   Auscultation of heart: Normal rate and rhythm, normal S1 and S2, without murmurs  Abdomen   Abdomen: Abnormal   The abdomen was rounded and obese  The abdomen was soft and nontender  Liver and spleen: No hepatomegaly or splenomegaly  Skin   Skin and subcutaneous tissue: Normal without rashes or lesions  Psychiatric   Orientation to person, place and time: Normal     Mood and affect: Normal     Chest - Chest: Normal       Assessment    1  Gastroesophageal reflux disease (530 81) (K21 9)   2  Hepatosplenomegaly (571 8) (R16 2)   3  Hyperinsulinemia (251 1) (E16 1)   4  Irritable bowel syndrome with diarrhea (564 1) (K58 0)   5  Nonalcoholic fatty liver disease (571 8) (K76 0)   6  Morbid or severe obesity due to excess calories (278 01) (E66 01)   7  Family history of Blindness due to type 2 diabetes mellitus : Maternal Grandfather    Plan  Gastroesophageal reflux disease    · RaNITidine HCl - 300 MG Oral Capsule; TAKE 1 CAPSULE AT BEDTIME, may  take another in morning as needed for heartburn   Rx By: David Cost; Dispense: 30 Days ; #:60 Capsule; Refill: 3; For: Gastroesophageal reflux disease; ANTONY = N; [facility] [place] [address] ; Last Updated By: System, [employer]; 9/13/2017 3:34:45 PM  PMH: Abdominal pain, diffuse, PMH: Acute diarrhea    · Dicyclomine HCl - 20 MG Oral Tablet; Take 1 tablet 3 times a day ONLY AS  NEEDED for abdominal cramping and pain   Rx By: David Cost; Dispense: 30 Days ; #:1 X 90 Tablet Bottle; Refill: 3; For: PMH: Abdominal pain, diffuse, PMH: Acute diarrhea; ANTONY = N; [facility] [place] [address] ; Last Updated By: System, [employer]; 9/13/2017 3:34:47 PM                          The patients parent/guardian was given the following special diet instructions for: Other Elimination Diets    Continue a healthy diet eating plenty of fruits and vegetables   Continue to drink water and flavored water and consume nonfat dairy; avoid fried food and fast food when possible  Discussion/Summary    Christal's hepatosplenomegaly is improving as evidenced by recent ultrasound  His cholesterol and triglycerides are within normal limits  His hemoglobin A1c, glucose level, thyroid studies, and blood count are also within normal limits  His esophageal reflux and irritable bowel syndrome have been well-controlled  We have asked him to continue using ranitidine once a day in the evening  He may use a second dose 12 hours later if needed  Today we'd like him to stop the regular use of dicyclomine and only use it as needed if he has abdominal cramping or loose stools  His insulin level was elevated to 92 8 and we have asked him to make a follow-up appointment with Dr Tanya Moise in pediatric endocrinology to discuss this further  We would like to see him back in 4 months for reassessment  The patient, patient's family was counseled regarding diagnostic results, instructions for management, risk factor reductions, prognosis, patient and family education, risks and benefits of treatment options, importance of compliance with treatment  30 minutes was spent counseling  Patient is able to Self-Care  Patient agrees and allows to involve family/caregiver in development of care plan:   Possible side effects of new medications were reviewed with the patient/guardian today  The treatment plan was reviewed with the patient/guardian  The patient/guardian understands and agrees with the treatment plan      Thank you very much for allowing me to participate in the care of this patient  If you have any questions, please do not hesitate to contact me        Future Appointments    Signatures   Electronically signed by : Vale Valdovinos; Sep 13 2017  3:33PM EST                       (Author)    Electronically signed by : Tod Boas, M D ; Sep 13 2017  4:01PM EST                       (Author)

## 2018-01-12 NOTE — RESULT NOTES
Message      Task to Oskar Garzon  US shows hepatosplenomegaly but no portal hypertension  Verified Results  US DUPLEX AR/VN ABD,PELVIS LIMITED [id number] 08:52AM Helen Alfaro Order Number: [id number]   Performing Comments: Please assess vessels by Doppler to R/O portal hypertension   - Patient Instructions: To schedule this appointment, please contact Central Scheduling at [phone]  Test Name Result Flag Reference   US DUPLEX AR/VN ABD,PELVIS LIMITED (Report)     ABDOMEN ULTRASOUND, COMPLETE     INDICATION: Prominent vessels seen on EGD  Evaluate for portal hypertension  COMPARISON: None     TECHNIQUE:  Real-time ultrasound of the abdomen was performed with a curvilinear transducer with both volumetric sweeps and still imaging techniques  FINDINGS:     PANCREAS: Visualized portions of the pancreas are within normal limits  AORTA AND IVC: Visualized portions are normal for patient age  LIVER:   Size: Enlarged  The liver measures 21 2 cm in the midclavicular line  Contour: Surface contour is smooth  Parenchyma: Borderline increased echogenicity right lobe which may reflect fatty infiltration  No evidence of suspicious mass  The main portal vein is patent and hepatopetal  Portal vein is top normal caliber measuring 13 mm  Velocity in the portal vein measures 21 0 cm/s  There is appropriate directional flow within the visualized intrahepatic portal veins  The hepatic    veins are patent  BILIARY:   The gallbladder is normal in caliber  No wall thickening or pericholecystic fluid  No stones or sludge identified  Sonographic Rulon Cheek sign is negative  No intrahepatic biliary dilatation  CBD measures 4 mm  No choledocholithiasis  KIDNEY:    Right kidney measures 12 3 x 4 4 cm  Within normal limits  Left kidney measures 12 6 x 7 3 cm  Within normal limits  SPLEEN:    Measures 13 7 cm  Enlarged  No focal lesions  ASCITES: None  IMPRESSION:     Hepatosplenomegaly with possible mild fatty infiltration right lobe  No focal hepatic lesions  Portal vein is top normal caliber with hepatopedal flow  Otherwise, unremarkable abdominal ultrasound         Workstation performed: [id number]     Signed by:   Jose Cote DO   7/5/16       Signatures   Electronically signed by : SHRUTI Loera ; Jul 5 2016 12:59PM EST                       (Author)

## 2018-01-13 VITALS
HEIGHT: 73 IN | TEMPERATURE: 98 F | BODY MASS INDEX: 41.75 KG/M2 | DIASTOLIC BLOOD PRESSURE: 80 MMHG | SYSTOLIC BLOOD PRESSURE: 130 MMHG | WEIGHT: 315 LBS | HEART RATE: 84 BPM

## 2018-01-13 NOTE — MISCELLANEOUS
Message   Recorded as Task   Date: 09/07/2016 10:46 AM, Created By: Bib Roy   Task Name: Medical Complaint Callback   Assigned To: wesly arce triage,Team   Regarding Patient: Neymar Norman, Status: In Progress   Comment:    Shoneberger,Courtney - 07 Sep 2016 10:46 AM     TASK CREATED  Caller: remi, Mother; Medical Complaint; [phone]  [place] pt  still having loose stools  spoke with the nurse yesterday and nothing is helping  mom wants him seen   Figueroa Pintoi - 07 Sep 2016 10:47 AM     TASK IN PROGRESS   Daniella Marion - 07 Sep 2016 10:59 AM     TASK EDITED              2nd day of loose stools and cant go to school  Temp 101  Stomach pain on the left side  Blood in stools  Was in [facility] June 4 -July 24th  Taking Bentyl and ADHD MEDS  Dr Recinos Older office would not see him they said it was viral  Mom states "I need some help here"  He is not sleeping at night due to diarrhea  Stool samples done-neg  [place] was [place]  Drinking and urinating OK  aPT  1130A GIVEN TODAY        Active Problems   1  Abdominal pain, diffuse (789 00) (R10 84)  2  Acanthosis nigricans (701 2) (L83)  3  Acute diarrhea (787 91) (R19 7)  4  Acute pharyngitis (462) (J02 9)  5  ADHD (attention deficit hyperactivity disorder), predominantly hyperactive impulsive type   (314 01) (F90 1)  6  Allergic rhinitis (477 9) (J30 9)  7  Asthma (493 90) (J45 909)  8  Dislocation of proximal interphalangeal joint of left little finger, initial encounter (834 02)   (B98 006S)  9  Eczema (692 9) (L30 9)  10  Gastroesophageal reflux disease (530 81) (K21 9)  11  Gynecomastia, male (611 1) (N62)  15  Hepatosplenomegaly (571 8) (R16 2)  13  Injury of little finger (959 5) (S69 90XA)  14  Insulin resistance (277 7) (E88 81)  15  Irregular bowel habits (569 89) (R19 8)  16  Migraine headache (346 90) (G43 909)  17  Morbid or severe obesity due to excess calories (278 01) (E66 01)  18   Nonalcoholic fatty liver disease (571 8) (K76 0)  19  Pain of finger of left hand (729 5) (M79 645)  20  Skin rash (782 1) (R21)  21  Stria (701 3) (L90 6)  22  Traumatic rupture of radial collateral ligament (848 9) (S53 20XA)  23  Vitamin D deficiency (268 9) (E55 9)    Current Meds  1  Adderall TABS; Therapy: (Recorded:08Jun2016) to Recorded  2  CloNIDine HCl - 0 3 MG Oral Tablet; Therapy: 67CNG1458 to Recorded  3  Dicyclomine HCl - 20 MG Oral Tablet; TAKE 1 TABLET 3 TIMES DAILY AS NEEDED FOR   ABDOMINAL CRAMPING AND PAIN;   Therapy: 16Wwa9039 to (Evaluate:22Oct2016)  Requested for: 98Fmd6214; Last   Rx:15Vzr1524 Ordered  4  Excedrin Migraine 250-250-65 MG Oral Tablet; two tablets PO daily as needed for   migraine headache; Therapy: 72VOR9823 to (Last Rx:31Mar2015)  Requested for: 41OWC8401 Ordered  5  Iqcolurd9Llhp Oral Capsule; take 1 tab daily; Therapy: 20ZWC7724 to (Evaluate:18Oct2016)  Requested for: 39JLT4904; Last   Rx:19Lpp4242 Ordered  6  Flovent  MCG/ACT Inhalation Aerosol; Inhale 2 puffs twice daily; Therapy: 52Tuy2616 to (Serina Bassett)  Requested for: 12ICE9268; Last   Rx:02Nov2015 Ordered  7  Fluticasone Propionate 50 MCG/ACT Nasal Suspension (Flonase); 1 spray each nostril   1-2 times a day; Therapy: 31HNY0143 to (Evaluate:12Jan2016)  Requested for: 91AYL1225; Last   Rx:74Vdp5205; Status: ACTIVE - Renewal Denied Ordered  8  Montelukast Sodium 10 MG Oral Tablet; TAKE 1 TABLET BY MOUTH ONCE A DAY; Therapy: 04Wfw4160 to (Evaluate:19Ngf3289)  Requested for: 45Rnl1509; Last   Rx:77Myt2517 Ordered  9  Ranitidine HCl - 300 MG Oral Capsule; TAKE 1 CAPSULE AT BEDTIME; Therapy: 67JAB8817 to (Evaluate:40Hjt9024)  Requested for: 40JSK9880; Last   Rx:79Bed6138 Ordered  10  Ventolin  (90 Base) MCG/ACT Inhalation Aerosol Solution; INHALE 2 PUFFS    EVERY 4 HOURS AS NEEDED FOR COUGH AND WHEEZING;     Therapy: 19RLD9969 to (Olive Reason)  Requested for: 21Apr2016; Last    Rx:29Oct2015; Status: ACTIVE - Renewal Denied Ordered  11  Vitamin D3 5000 UNIT Oral Capsule; take 1 capsule daily; Therapy: 78Hlu7487 to (Last Rx:03Nei7034)  Requested for: 23Jun2016 Ordered  12  Vyvanse CAPS; Therapy: (29-29-69-33) to Recorded  13  ZyrTEC Allergy 10 MG Oral Tablet; TAKE 1 TABLET AT BEDTIME; Therapy: 54Jlo8027 to (Evaluate:88Uic2884)  Requested for: 24Wme3157; Last    Rx:15Ykp3722 Ordered    Allergies   1  Penicillin V Potassium SOLR   2  Animal dander - Cats  3  Animal dander - Dogs  4   Seasonal    Signatures   Electronically signed by : Laina Rebolledo, ; Sep  7 2016 11:00AM EST                       (Author)    Electronically signed by : Anshul Rudolph MD; Sep  7 2016 11:35AM EST                       (Author)

## 2018-01-13 NOTE — MISCELLANEOUS
Message   Recorded as Task   Date: 08/23/2016 02:12 PM, Created By: Yasmin Romero   Task Name: Follow Up   Assigned To: Fernandez Roe   Regarding Patient: Ariadne Cedillo, Status: Active   Comment:    EribertoVidya - 23 Aug 2016 2:12 PM     TASK CREATED  Stool culture so far negative with some tests pending-wait for final to call parents  Fernandez Roe - 26 Aug 2016 4:19 PM     TASK EDITED  Please check other note  Active Problems    1  Abdominal pain, diffuse (789 00) (R10 84)   2  Acanthosis nigricans (701 2) (L83)   3  Acute diarrhea (787 91) (R19 7)   4  Acute pharyngitis (462) (J02 9)   5  ADHD (attention deficit hyperactivity disorder), predominantly hyperactive impulsive type   (314 01) (F90 1)   6  Allergic rhinitis (477 9) (J30 9)   7  Asthma (493 90) (J45 909)   8  Dislocation of proximal interphalangeal joint of left little finger, initial encounter (834 02)   (J10 129E)   9  Eczema (692 9) (L30 9)   10  Gastroesophageal reflux disease (530 81) (K21 9)   11  Gynecomastia, male (611 1) (N62)   15  Hepatosplenomegaly (571 8) (R16 2)   13  Injury of little finger (959 5) (S69 90XA)   14  Insulin resistance (277 7) (E88 81)   15  Irregular bowel habits (569 89) (R19 8)   16  Migraine headache (346 90) (G43 909)   17  Morbid or severe obesity due to excess calories (278 01) (E66 01)   18  Nonalcoholic fatty liver disease (571 8) (K76 0)   19  Pain of finger of left hand (729 5) (M79 645)   20  Skin rash (782 1) (R21)   21  Stria (701 3) (L90 6)   22  Traumatic rupture of radial collateral ligament (848 9) (S53 20XA)   23  Vitamin D deficiency (268 9) (E55 9)    Current Meds   1  Adderall TABS; Therapy: (Recorded:08Jun2016) to Recorded   2  CloNIDine HCl - 0 3 MG Oral Tablet; Therapy: 10JYF9997 to Recorded   3   Dicyclomine HCl - 20 MG Oral Tablet; TAKE 1 TABLET 3 TIMES DAILY AS NEEDED FOR   ABDOMINAL CRAMPING AND PAIN;   Therapy: 98Fqf3997 to (Evaluate:22Oct2016)  Requested for: 34Dap9317; Last   Rx:48Gxc6142 Ordered   4  Excedrin Migraine 250-250-65 MG Oral Tablet; two tablets PO daily as needed for   migraine headache; Therapy: 18NGZ3859 to (Last Rx:31Mar2015)  Requested for: 07HUL1062 Ordered   5  Nieynzpn1Kbnk Oral Capsule; take 1 tab daily; Therapy: 13GNN1419 to (Evaluate:18Oct2016)  Requested for: 90BYK0823; Last   Rx:05Gsw3628 Ordered   6  Flovent  MCG/ACT Inhalation Aerosol; Inhale 2 puffs twice daily; Therapy: 43Nbn9352 to ([place] Ra)  Requested for: 73VNP8599; Last   Rx:02Nov2015 Ordered   7  Fluticasone Propionate 50 MCG/ACT Nasal Suspension (Flonase); 1 spray each nostril   1-2 times a day; Therapy: 67PNU6056 to (Evaluate:12Jan2016)  Requested for: 54YCQ6160; Last   Rx:43Aac0554; Status: ACTIVE - Renewal Denied Ordered   8  Montelukast Sodium 10 MG Oral Tablet; TAKE 1 TABLET BY MOUTH ONCE A DAY; Therapy: 73Yfa9002 to (Evaluate:23Djd7520)  Requested for: 76Vdg0045; Last   Rx:68Mpq1101 Ordered   9  Ranitidine HCl - 300 MG Oral Capsule; TAKE 1 CAPSULE AT BEDTIME; Therapy: 26BDF0315 to (Evaluate:14Xvr5037)  Requested for: 74OEJ8331; Last   Rx:43Daa3960 Ordered   10  Ventolin  (90 Base) MCG/ACT Inhalation Aerosol Solution; INHALE 2 PUFFS    EVERY 4 HOURS AS NEEDED FOR COUGH AND WHEEZING; Therapy: 63EXN0299 to (Ofelia Doe)  Requested for: 91Mtn5718; Last    Rx:29Oct2015; Status: ACTIVE - Renewal Denied Ordered   11  Vitamin D3 5000 UNIT Oral Capsule; take 1 capsule daily; Therapy: 16Vqo2803 to (Last Rx:99Sdi1508)  Requested for: 23Jun2016 Ordered   12  Vyvanse CAPS; Therapy: (397 3486) to Recorded   13  ZyrTEC Allergy 10 MG Oral Tablet; TAKE 1 TABLET AT BEDTIME; Therapy: 85Dlb4843 to (Evaluate:13Nov2015)  Requested for: 18Vgr6613; Last    Rx:99Gfd8889 Ordered    Allergies    1  Penicillin V Potassium SOLR    2  Animal dander - Cats   3  Animal dander - Dogs   4   Seasonal    Signatures   Electronically signed by : Shilpa Xiong, ; Aug 26 2016  4:19PM EST                       (Author)

## 2018-01-14 VITALS
WEIGHT: 315 LBS | DIASTOLIC BLOOD PRESSURE: 82 MMHG | HEIGHT: 72 IN | BODY MASS INDEX: 42.66 KG/M2 | SYSTOLIC BLOOD PRESSURE: 132 MMHG

## 2018-01-14 VITALS
HEIGHT: 73 IN | DIASTOLIC BLOOD PRESSURE: 72 MMHG | SYSTOLIC BLOOD PRESSURE: 110 MMHG | WEIGHT: 315 LBS | HEART RATE: 88 BPM | BODY MASS INDEX: 41.75 KG/M2

## 2018-01-14 NOTE — MISCELLANEOUS
Message   Recorded as Task   Date: 12/15/2016 08:57 AM, Created By: Ines Delaney)   Task Name: Medical Complaint Callback   Assigned To: [facility] triage,Team   Regarding Patient: Flores Bass, Status: In Progress   Comment:    Sherie Morales) - 15 Dec 2016 8:57 AM     TASK CREATED  Caller: Rosie Dc, Mother; Medical Complaint; [phone]  [facility]- COMPLAINING ON BACK PAIN, MOM HAS BEEN USING A HEATING PAD, GIVING HIM PAIN MEDS AND ICE, NOTHING IS WORKING   MaxineJanine - 15 Dec 2016 9:05 AM     TASK IN PROGRESS   MaxineErica - 15 Dec 2016 9:06 AM     TASK EDITED  L/m for mom to call back   Wilfredoaichagarrett Chauhan - 15 Dec 2016 9:11 AM     TASK EDITED  plreasgarrett call back   Maritza Keith - 15 Dec 2016 9:13 AM     TASK IN PROGRESS   Maritza Keith - 15 Dec 2016 9:23 AM     TASK EDITED  c/o  upper back pain and shoulder pain for  5 days  no known injury  wants seen  made appt for 1130        Active Problems   1  Acanthosis nigricans (701 2) (L83)  2  Acute diarrhea (787 91) (R19 7)  3  ADHD (attention deficit hyperactivity disorder), predominantly hyperactive impulsive type   (314 01) (F90 1)  4  Allergic rhinitis (477 9) (J30 9)  5  Asthma (493 90) (J45 909)  6  Eczema (692 9) (L30 9)  7  Encounter for screening examination for sexually transmitted disease (V74 5) (Z11 3)  8  Gastroesophageal reflux disease (530 81) (K21 9)  9  Gynecomastia, male (611 1) (N62)  10  Hepatosplenomegaly (571 8) (R16 2)  11  Insulin resistance (277 7) (E88 81)  12  Irregular bowel habits (569 89) (R19 8)  13  Migraine headache (346 90) (G43 909)  14  Morbid or severe obesity due to excess calories (278 01) (E66 01)  15  Nonalcoholic fatty liver disease (571 8) (K76 0)  16  Prediabetes (790 29) (R73 09)  17  Skin rash (782 1) (R21)  18  Stria (701 3) (L90 6)  19  Vitamin D deficiency (268 9) (E55 9)    Current Meds  1  CloNIDine HCl - 0 3 MG Oral Tablet; Therapy: 18JWO3700 to Recorded  2   Dicyclomine HCl - 20 MG Oral Tablet; TAKE 1 TABLET 3 TIMES DAILY AS NEEDED FOR   ABDOMINAL CRAMPING AND PAIN;   Therapy: 19Vfi5622 to (Evaluate:22Oct2016)  Requested for: 49Iat7356; Last   Rx:63Oen6280 Ordered  3  Excedrin Migraine 250-250-65 MG Oral Tablet; two tablets PO daily as needed for   migraine headache; Therapy: 16UKT2449 to (Last Rx:31Mar2015)  Requested for: 62FJV1478 Ordered  4  Opowbfle4Arrl Oral Capsule; take 1 tab daily; Therapy: 42EAH4430 to (Evaluate:18Oct2016)  Requested for: 26UFJ9529; Last   Rx:93Uls8633 Ordered  5  Flovent  MCG/ACT Inhalation Aerosol; Inhale 2 puffs twice daily; Therapy: 10Vko1685 to ([employer])  Requested for: 04CVB1975; Last   Rx:02Nov2015 Ordered  6  Fluticasone Propionate 50 MCG/ACT Nasal Suspension (Flonase); 1 spray each nostril   1-2 times a day; Therapy: 68HTB6936 to (Evaluate:24Jan2017)  Requested for: 02IKA3270; Last   Rx:82Zqi1956 Ordered  7  Montelukast Sodium 10 MG Oral Tablet; TAKE 1 TABLET BY MOUTH ONCE A DAY; Therapy: 10Nnu2153 to (Evaluate:70Sbu6983)  Requested for: 75Vmi4472; Last   Rx:10Pqx8019 Ordered  8  RaNITidine HCl - 300 MG Oral Capsule; TAKE 1 CAPSULE AT BEDTIME; Therapy: 56TRS9556 to (Evaluate:26Oct2016)  Requested for: 98RTV5704; Last   Rx:71Gfq8178 Ordered  9  Ventolin  (90 Base) MCG/ACT Inhalation Aerosol Solution; INHALE 2 PUFFS   EVERY 4 HOURS AS NEEDED FOR COUGH AND WHEEZING; Therapy: 85WNW9884 to (Evaluate:10Cec4208)  Requested for: 06USU2897; Last   Rx:91Fbc7110 Ordered  10  Vitamin D3 5000 UNIT Oral Capsule; take 1 capsule daily; Therapy: 31Bbj9410 to (Last Rx:87Kgv6634)  Requested for: 88Ohr1607 Ordered  11  Vyvanse CAPS; Therapy: [phone] to Recorded  12  ZyrTEC Allergy 10 MG Oral Tablet; TAKE 1 TABLET AT BEDTIME; Therapy: 03Sep2014 to (Evaluate:13Nov2015)  Requested for: 14Sep2015; Last    Rx:14Sep2015 Ordered    Allergies   1  Penicillin V Potassium SOLR   2  Animal dander - Cats  3   Animal dander - Dogs  4   Seasonal    Signatures   Electronically signed by : Charlee Villa, ; Dec 15 2016  9:24AM EST                       (Author)    Electronically signed by : Evelyn Craig DO; Dec 15 2016  4:25PM EST                       (Acknowledgement)

## 2018-01-14 NOTE — MISCELLANEOUS
Message   Recorded as Task   Date: 07/12/2017 02:38 PM, Created By: Rick Wilkins   Task Name: Med Renewal Request   Assigned To: Cayla Jacobs   Regarding Patient: Nicolas Workman, Status: Active   CommentLushalonda Roscoe - 12 Jul 2017 2:38 PM     TASK CREATED  PT NEEDS REFILL ON DICYCLOMINE   Cayla Jacobs - 12 Jul 2017 2:49 PM     TASK EDITED  SENT TO PHARMACY        Active Problems    1  Acanthosis nigricans (701 2) (L83)   2  ADHD (attention deficit hyperactivity disorder), predominantly hyperactive impulsive type   (314 01) (F90 1)   3  Allergic rhinitis (477 9) (J30 9)   4  Asthma (493 90) (J45 909)   5  Back pain (724 5) (M54 9)   6  Daytime somnolence (780 54) (R40 0)   7  Eczema (692 9) (L30 9)   8  Encounter for screening examination for sexually transmitted disease (V74 5) (Z11 3)   9  Excessive cerumen in both ear canals (380 4) (H61 23)   10  Gastroesophageal reflux disease (530 81) (K21 9)   11  Gynecomastia, male (611 1) (N62)   15  Hepatosplenomegaly (571 8) (R16 2)   13  Hyperinsulinemia (251 1) (E16 1)   14  Insulin resistance (277 7) (E88 81)   15  Irregular bowel habits (569 89) (R19 8)   16  Irritable bowel syndrome with diarrhea (564 1) (K58 0)   17  Migraine, unspecified, not intractable, without status migrainosus (346 90) (G43 909)   18  Morbid or severe obesity due to excess calories (278 01) (E66 01)   19  Nonalcoholic fatty liver disease (571 8) (K76 0)   20  Prediabetes (790 29) (R73 09)   21  Skin rash (782 1) (R21)   22  Snoring (786 09) (R06 83)   23  Stria (701 3) (L90 6)   24  Vitamin D deficiency (268 9) (E55 9)    Current Meds   1  CloNIDine HCl - 0 3 MG Oral Tablet; Therapy: 25AHM6889 to Recorded   2  Dicyclomine HCl - 20 MG Oral Tablet; TAKE 1 TABLET 3 TIMES DAILY AS NEEDED FOR   ABDOMINAL CRAMPING AND PAIN;   Therapy: 67Trg7119 to (Evaluate:17Jun2017)  Requested for: 18EVC4598; Last   Rx:12Pzy4990 Ordered   3   Excedrin Migraine 250-250-65 MG Oral Tablet; two tablets PO daily as needed for   migraine headache; Therapy: 34QIZ3241 to (Last Rx:31Mar2015)  Requested for: 64DRX7620 Ordered   4  Flovent  MCG/ACT Inhalation Aerosol; Inhale 2 puffs twice daily; Therapy: 64Vuc7581 to (Jeffery Gloria)  Requested for: 92EEY8000; Last   Rx:02Nov2015 Ordered   5  Fluticasone Propionate 50 MCG/ACT Nasal Suspension (Flonase); 1 spray each nostril   1-2 times a day; Therapy: 07NHQ2977 to (Evaluate:24Jan2017)  Requested for: 39DPL5499; Last   Rx:89Kyz4418 Ordered   6  Montelukast Sodium 10 MG Oral Tablet; TAKE 1 TABLET BY MOUTH ONCE A DAY; Therapy: 17Wou6778 to (Evaluate:14Bvk2008)  Requested for: 93Iqg8196; Last   Rx:77Kml5653 Ordered   7  RaNITidine HCl - 300 MG Oral Capsule; TAKE 1 CAPSULE AT BEDTIME, may take   another in morning as needed for heartburn; Therapy: 23SUG1829 to (Hattie Paul)  Requested for: 77JFT3128; Last   Rx:44Ntw5577 Ordered   8  Ventolin  (90 Base) MCG/ACT Inhalation Aerosol Solution; INHALE 2 PUFFS   EVERY 4 HOURS AS NEEDED FOR COUGH AND WHEEZING; Therapy: 97FQH3485 to (Evaluate:64Bqa7911)  Requested for: 27VWI3083; Last   Rx:99Zlz1099 Ordered   9  Vitamin D3 5000 UNIT Oral Capsule; take 1 capsule daily; Therapy: 72Nis0746 to (Last Rx:15Apr2016)  Requested for: 23Jun2016 Ordered   10  Vyvanse 30 MG Oral Capsule; Therapy: (Recorded:39Yvl2168) to Recorded   11  ZyrTEC Allergy 10 MG Oral Tablet; TAKE 1 TABLET AT BEDTIME; Therapy: 94Zss3796 to (Evaluate:13Nov2015)  Requested for: 39Wxx4514; Last    Rx:87Txo0178 Ordered    Allergies    1  Penicillin V Potassium SOLR    2  Animal dander - Cats   3  Animal dander - Dogs   4   Seasonal    Plan  PMH: Abdominal pain, diffuse, PMH: Acute diarrhea    · Dicyclomine HCl - 20 MG Oral Tablet; TAKE 1 TABLET 3 TIMES DAILY AS  NEEDED FOR ABDOMINAL CRAMPING AND PAIN    Signatures   Electronically signed by : Breana Pavon, ; Jul 12 2017  2:49PM EST                       (Author)

## 2018-01-14 NOTE — MISCELLANEOUS
Message   Recorded as Task   Date: 06/19/2016 09:18 PM, Created By: Umer Holliday   Task Name: Call Patient with results   Assigned To: Cayla Jacobs   Regarding Patient: Miguel Angel Zuluaga, Status: Active   Comment:    Juan De Souza - 19 Jun 2016 9:18 PM     Patient Phone: [phone]      Task to [facility]  See me  Shannon Gilbert - 20 Jun 2016 9:51 AM     TASK REASSIGNED: Previously Assigned To Juan De Souza Cynthia - 20 Jun 2016 1:13 PM     TASK REASSIGNED: Previously Assigned To Cayla Jacobs  YOU ARE SEEING PATIENT ON 6/23   Juan De Souza - 20 Jun 2016 3:32 PM     TASK REPLIED TO: Previously Assigned To Juan De Souza  Can set up w/u at the follow up visit later in week  Active Problems    1  Acanthosis nigricans (701 2) (L83)   2  Acute pharyngitis (462) (J02 9)   3  ADHD (attention deficit hyperactivity disorder), predominantly hyperactive impulsive type   (314 01) (F90 1)   4  Allergic rhinitis (477 9) (J30 9)   5  Asthma (493 90) (J45 909)   6  Dislocation of proximal interphalangeal joint of left little finger, initial encounter (834 02)   (H09 439N)   7  Eczema (692 9) (L30 9)   8  Gastroesophageal reflux disease (530 81) (K21 9)   9  Gynecomastia, male (611 1) (N62)   10  Injury of little finger (959 5) (S69 90XA)   11  Insulin resistance (277 7) (E88 81)   12  Irregular bowel habits (569 89) (R19 8)   13  Migraine headache (346 90) (G43 909)   14  Morbid or severe obesity due to excess calories (278 01) (E66 01)   15  Pain of finger of left hand (729 5) (M79 645)   16  Skin rash (782 1) (R21)   17  Stria (701 3) (L90 6)   18  Traumatic rupture of radial collateral ligament (848 9) (S53 20XA)   19  Vitamin D deficiency (268 9) (E55 9)    Current Meds   1  Adderall TABS; Therapy: (Recorded:74Wxb1416) to Recorded   2  CloNIDine HCl - 0 3 MG Oral Tablet; Therapy: 03BHD8555 to Recorded   3   Excedrin Migraine 250-250-65 MG Oral Tablet; two tablets PO daily as needed for migraine headache; Therapy: 69ZIJ5223 to (Last Rx:31Mar2015)  Requested for: 14SXC0026 Ordered   4  Flovent  MCG/ACT Inhalation Aerosol; Inhale 2 puffs twice daily; Therapy: 98Ahu3331 to (Alejandra Abernathy)  Requested for: 41IVW5793; Last   Rx:02Nov2015 Ordered   5  Fluticasone Propionate 50 MCG/ACT Nasal Suspension (Flonase); 1 spray each nostril   1-2 times a day; Therapy: 14CYV3598 to (Evaluate:12Jan2016)  Requested for: 89FPJ6525; Last   Rx:58Oru8620; Status: ACTIVE - Renewal Denied Ordered   6  Montelukast Sodium 10 MG Oral Tablet; TAKE 1 TABLET BY MOUTH ONCE A DAY; Therapy: 73Utc8004 to (Evaluate:84Ufv5577)  Requested for: 69VUD7414; Last   Rx:06Jun2016 Ordered   7  Ranitidine HCl - 300 MG Oral Capsule; TAKE 1 CAPSULE AT BEDTIME; Therapy: 61OXC4857 to (Evaluate:21Apr2016)  Requested for: 25Qah6135; Last   Rx:22Mar2016 Ordered   8  Ventolin  (90 Base) MCG/ACT Inhalation Aerosol Solution; INHALE 2 PUFFS   EVERY 4 HOURS AS NEEDED FOR COUGH AND WHEEZING; Therapy: 31CBL7191 to (Rigo Rinaldi)  Requested for: 21Apr2016; Last   Rx:29Oct2015; Status: ACTIVE - Renewal Denied Ordered   9  Vitamin D3 5000 UNIT Oral Capsule; take 1 capsule daily; Therapy: 68Qya0489 to (Last Rx:15Apr2016)  Requested for: 37Fsk6977 Ordered   10  Vyvanse CAPS; Therapy: ([phone]) to Recorded   11  ZyrTEC Allergy 10 MG Oral Tablet; TAKE 1 TABLET AT BEDTIME; Therapy: 93Zuf3376 to (Evaluate:13Nov2015)  Requested for: 11Pki5131; Last    Rx:14Sep2015 Ordered    Allergies    1  Penicillin V Potassium SOLR    2  Animal dander - Cats   3  Animal dander - Dogs   4   Seasonal    Signatures   Electronically signed by : Sherman Amezcua, ; Jun 20 2016  4:20PM EST                       (Author)

## 2018-01-15 NOTE — MISCELLANEOUS
Message  Return to work or school:      He is able to return to school on 09/07/2016    Mom called office today 09/06/2016 for medical advice, pt was NOT seen in office          Signatures   Electronically signed by : Veena Lopez, ; Sep  6 2016  4:48PM EST                       (Author)

## 2018-01-15 NOTE — MISCELLANEOUS
Message   Recorded as Task   Date: 02/03/2016 09:18 AM, Created By: Donell Maravilla   Task Name: Medical Complaint Callback   Assigned To: [facility] triage,Team   Regarding Patient: Darwin Freeman, Status: In Progress   Comment:   Morenita Keane - 03 Feb 2016 9:18 AM    TASK CREATED  Caller: remi, Mother; Medical Complaint; [phone]  lose stools since yesterday 3 today already and all day yesterday stomach making noises   [facility] - 03 Feb 2016 10:16 AM    TASK IN PROGRESS   [facility] - 03 Feb 2016 10:25 AM    TASK EDITED  Loose stools since yesterday  Feels cold and hot  Feels achey  Mom gave anti-diarrhea medicine  Needs school note  PROTOCOL: : Diarrhea- Pediatric Guideline     DISPOSITION: Home Care - Mild to moderate diarrhea, probably viral gastroenteritis     CARE ADVICE:      1 REASSURANCE:   * Most diarrhea is caused by a viral infection of the intestines  * Bacterial infections as a cause of diarrhea are uncommon  * Diarrhea is the body`s way of getting rid of the germs  * Here are some tips on how to keep ahead of fluid losses  3  MILD DIARRHEA TREATMENT (OVER 3YEAR OLD):  * Continue regular diet  * Eat more starchy foods (e g , cereal, crackers, rice)  * Drink more fluids  Milk is a good choice for mild diarrhea  (EXCEPTION: avoid all fruit juices and soft drinks because they make diarrhea worse) (Reason: high osmotic load)   6  FREQUENT, WATERY DIARRHEA IN OLDER CHILDREN (OVER 3YEAR OLD) :   * FLUIDS: Offer unlimited fluids  If taking solids, give water or half-strength Gatorade  If refuses solids, give milk or formula  * Avoid all fruit juices and soft drinks  (Reason: makes diarrhea worse)  * ORS is rarely needed, but for severe diarrhea, also give 4-8 ounces (120-240 ml) of ORS after every large watery stool  ORS is an Oral Rehydration Solution  It`s a special fluid that can help your child stay hydrated  You can use Pedialyte or the store brand   It can be bought in food or drug stores  * SOLIDS: Starchy foods are absorbed best  Give dried cereals, oatmeal, bread, crackers, noodles, mashed potatoes, rice, etc  Pretzels or salty crackers can help meet sodium needs  Return to normal diet in 24 hours  8 PROBIOTICS:  * Probiotics contain healthy bacteria (Lactobacilli) that can replace unhealthy bacteria in the GI tract  * YOGURT in the easiest source of probiotics  If over 12 months, give 2 to 6 ounces (60 to 180 ml) of yogurt twice daily  (Note: Today, almost all yogurts are `active culture` )  * Probiotic supplements in granules, tablets or capsules are also available in health food stores  9 FEVER:  * For fevers above 102 F (39 C), give acetaminophen (such as Tylenol) or ibuprofen  See Dosage Table  * Note: lower fevers are important for fighting infections  11 CONTAGIOUSNESS: Your child can return to day care or school after the stools are formed and the fever is gone  The school-aged child can return if the diarrhea is mild and the child has good control over loose stools  12  EXPECTED COURSE:Viral diarrhea lasts 5-14 days  Severe diarrhea only occurs on the first 1 or 2 days, but loose stools can persist for 1 to 2 weeks  13  CALL BACK IF:  * Signs of dehydration occur  * Diarrhea persists over 2 weeks  * Your child becomes worse   [facility] - 03 Feb 2016 10:27 AM    TASK EDITED  School note in the chart  Mom will  today  Active Problems   1  Acanthosis nigricans (701 2) (L83)  2  ADHD (attention deficit hyperactivity disorder), predominantly hyperactive impulsive type   (314 01) (F90 1)  3  Allergic rhinitis (477 9) (J30 9)  4  Asthma (493 90) (J45 909)  5  Eczema (692 9) (L30 9)  6  Gastroesophageal reflux disease (530 81) (K21 9)  7  Gynecomastia, male (611 1) (N62)  8  Insulin resistance (277 7) (E88 81)  9  Migraine headache (346 90) (G43 909)  10  Morbid or severe obesity due to excess calories (278 01) (E66 01)  11  Skin rash (782 1) (R21)  12  Stria (701 3) (L90 6)  13  Vitamin D deficiency (268 9) (E55 9)    Current Meds  1  CloNIDine HCl - 0 3 MG Oral Tablet; Therapy: 16ADH6637 to Recorded  2  Excedrin Migraine 250-250-65 MG Oral Tablet; two tablets PO daily as needed for   migraine headache; Therapy: 39ZRZ0348 to (Last Rx:31Mar2015)  Requested for: 54PHP3218 Ordered  3  Flovent  MCG/ACT Inhalation Aerosol; Inhale 2 puffs twice daily; Therapy: 97Mgx9468 to (Selma Youssef)  Requested for: 91BGX4176; Last   Rx:02Nov2015 Ordered  4  Fluticasone Propionate 50 MCG/ACT Nasal Suspension (Flonase); 1 spray each nostril   1-2 times a day; Therapy: 30TRH4144 to (Evaluate:12Jan2016)  Requested for: 15MOG1684; Last   Rx:74Hyt8183; Status: ACTIVE - Renewal Denied Ordered  5  Ibuprofen 600 MG Oral Tablet; TAKE 1 TABLET EVERY 6 HOURS AS NEEDED; Therapy: (Recorded:31Mar2015) to Recorded  6  MetFORMIN HCl - 500 MG Oral Tablet; TAKE 2 TABLETS TWICE DAILY WITH MEALS; Therapy: 03Sep2014 to Recorded  7  Montelukast Sodium 10 MG Oral Tablet; TAKE 1 TABLET BY MOUTH ONCE A DAY; Therapy: 62Brn4316 to (Evaluate:40Hxm0755)  Requested for: 13RSP1344; Last   Rx:27Jan2016; Status: ACTIVE - Renewal Denied Ordered  8  Ventolin  (90 Base) MCG/ACT Inhalation Aerosol Solution; INHALE 2 PUFFS   EVERY 4 HOURS AS NEEDED FOR COUGH AND WHEEZING; Therapy: 88RCB1427 to (Domenico Walsh)  Requested for: 0490 39 07 81; Last   Rx:29Oct2015 Ordered  9  Vyvanse CAPS; Therapy: ([phone]) to Recorded  10  ZyrTEC Allergy 10 MG Oral Tablet; TAKE 1 TABLET AT BEDTIME; Therapy: 93Qnx3714 to (Evaluate:13Nov2015)  Requested for: 15Shm6669; Last    Rx:15Esl5878 Ordered    Allergies   1  Penicillin V Potassium SOLR   2  Animal dander - Cats  3  Animal dander - Dogs  4   Seasonal    Signatures   Electronically signed by : Royce Cates RN; Feb  3 2016 10:27AM EST                       (Author)    Electronically signed by : Pritesh Oakes DO; Feb  3 2016 12:03PM EST                       (Acknowledgement)

## 2018-01-15 NOTE — MISCELLANEOUS
Message   Recorded as Task   Date: 08/25/2016 04:46 PM, Created By: [facility]   Task Name: Follow Up   Assigned To: Boyandi Hilario   Regarding Patient: Alexandra Mayo, Status: Active   CommentDelos Counter - 25 Aug 2016 4:46 PM     TASK CREATED  Stool cultures negative   Yoli Rayo - 26 Aug 2016 4:20 PM     TASK EDITED  LM for pt and parents with results  Active Problems    1  Abdominal pain, diffuse (789 00) (R10 84)   2  Acanthosis nigricans (701 2) (L83)   3  Acute diarrhea (787 91) (R19 7)   4  Acute pharyngitis (462) (J02 9)   5  ADHD (attention deficit hyperactivity disorder), predominantly hyperactive impulsive type   (314 01) (F90 1)   6  Allergic rhinitis (477 9) (J30 9)   7  Asthma (493 90) (J45 909)   8  Dislocation of proximal interphalangeal joint of left little finger, initial encounter (834 02)   (L03 291O)   9  Eczema (692 9) (L30 9)   10  Gastroesophageal reflux disease (530 81) (K21 9)   11  Gynecomastia, male (611 1) (N62)   15  Hepatosplenomegaly (571 8) (R16 2)   13  Injury of little finger (959 5) (S69 90XA)   14  Insulin resistance (277 7) (E88 81)   15  Irregular bowel habits (569 89) (R19 8)   16  Migraine headache (346 90) (G43 909)   17  Morbid or severe obesity due to excess calories (278 01) (E66 01)   18  Nonalcoholic fatty liver disease (571 8) (K76 0)   19  Pain of finger of left hand (729 5) (M79 645)   20  Skin rash (782 1) (R21)   21  Stria (701 3) (L90 6)   22  Traumatic rupture of radial collateral ligament (848 9) (S53 20XA)   23  Vitamin D deficiency (268 9) (E55 9)    Current Meds   1  Adderall TABS; Therapy: (Recorded:08Jun2016) to Recorded   2  CloNIDine HCl - 0 3 MG Oral Tablet; Therapy: 07QAO2553 to Recorded   3  Dicyclomine HCl - 20 MG Oral Tablet; TAKE 1 TABLET 3 TIMES DAILY AS NEEDED FOR   ABDOMINAL CRAMPING AND PAIN;   Therapy: 02Yes3688 to (Evaluate:22Oct2016)  Requested for: 15Dfj0122; Last   Rx:60Uvh4875 Ordered   4   Excedrin Migraine 250-250-65 MG Oral Tablet; two tablets PO daily as needed for   migraine headache; Therapy: 77OFG5823 to (Last Rx:31Mar2015)  Requested for: 23HXW4226 Ordered   5  Sreoiquf4Cnlh Oral Capsule; take 1 tab daily; Therapy: 72TVR5198 to (Evaluate:18Oct2016)  Requested for: 33HDD9103; Last   Rx:86Akd7291 Ordered   6  Flovent  MCG/ACT Inhalation Aerosol; Inhale 2 puffs twice daily; Therapy: 14Ick9901 to (Floy Bigness)  Requested for: 21TEY1868; Last   Rx:02Nov2015 Ordered   7  Fluticasone Propionate 50 MCG/ACT Nasal Suspension (Flonase); 1 spray each nostril   1-2 times a day; Therapy: 30KJM6831 to (Evaluate:12Jan2016)  Requested for: 23YEH5281; Last   Rx:30Gdj9715; Status: ACTIVE - Renewal Denied Ordered   8  Montelukast Sodium 10 MG Oral Tablet; TAKE 1 TABLET BY MOUTH ONCE A DAY; Therapy: 67Kqf4916 to (Evaluate:98Ooa9032)  Requested for: 43Fwd5825; Last   Rx:15Kzr9697 Ordered   9  Ranitidine HCl - 300 MG Oral Capsule; TAKE 1 CAPSULE AT BEDTIME; Therapy: 87HXD5849 to (Evaluate:88Vqw2932)  Requested for: 98UMC3905; Last   Rx:38Edx6370 Ordered   10  Ventolin  (90 Base) MCG/ACT Inhalation Aerosol Solution; INHALE 2 PUFFS    EVERY 4 HOURS AS NEEDED FOR COUGH AND WHEEZING; Therapy: 89KCR9849 to (Loly Moore)  Requested for: 21Apr2016; Last    Rx:29Oct2015; Status: ACTIVE - Renewal Denied Ordered   11  Vitamin D3 5000 UNIT Oral Capsule; take 1 capsule daily; Therapy: 58Xvc7249 to (Last Rx:15Apr2016)  Requested for: 23Jun2016 Ordered   12  Vyvanse CAPS; Therapy: (980 4210) to Recorded   13  ZyrTEC Allergy 10 MG Oral Tablet; TAKE 1 TABLET AT BEDTIME; Therapy: 28Hhp6550 to (Evaluate:13Nov2015)  Requested for: 50Xav9239; Last    Rx:16Yml6212 Ordered    Allergies    1  Penicillin V Potassium SOLR    2  Animal dander - Cats   3  Animal dander - Dogs   4   Seasonal    Signatures   Electronically signed by : Rita Figueroa, ; Aug 26 2016  4:20PM EST (Author)

## 2018-01-16 NOTE — RESULT NOTES
Verified Results  * US ABDOMEN COMPLETE 15Jun2017 07:24AM Amaya Francis    Order Number: [id number]    - Patient Instructions: To schedule this appointment, please contact Central Scheduling at [phone]   Order Number: [id number]    - Patient Instructions: To schedule this appointment, please contact Central Scheduling at [phone]  Test Name Result Flag Reference   US ABDOMEN COMPLETE (Report)     ABDOMEN ULTRASOUND, COMPLETE     INDICATION: Follow-up Hepatomegaly     COMPARISON: June 30, 2016     TECHNIQUE:  Real-time ultrasound of the abdomen was performed with a curvilinear transducer with both volumetric sweeps and still imaging techniques  FINDINGS:     PANCREAS: Pancreatic tail partially obscured by overlying bowel gas  Visualized portions of the pancreas are within normal limits  AORTA AND IVC: Visualized portions are normal for patient age  LIVER:   Size: Enlarged  The liver measures 19 1 cm in the midclavicular line  Previously 21 2 cm  Contour: Surface contour is smooth  Parenchyma: Parenchymal echotexture is heterogeneously elevated with diminished through transmission   No evidence of suspicious mass  The main portal vein is patent and hepatopetal       BILIARY:   The gallbladder is contracted   Gallbladder wall is thickened at 4 mm, this is nonspecific given the degree of contraction  No stones or sludge identified  Sonographic Guadelupe Rhodesdale sign is negative  No intrahepatic biliary dilatation  CBD measures 3 mm  No choledocholithiasis  KIDNEY:    Right kidney measures 11 6 x 4 2 cm  Within normal limits  Left kidney measures 10 5 x 6 8 cm  Within normal limits  SPLEEN:    Measures 13 x 8 6 x 4 3 cm  Previously 13 7 x 4 9 x 12 7 cm  Within normal limits  ASCITES: None           IMPRESSION:   Enlarged fatty liver, decreased in size from previous exam      Borderline enlarged spleen, decreased in size from previous exam      Contracted gallbladder  Gallbladder wall is thickened which is likely due to contraction  Patient is asymptomatic  Biliary ducts are normal caliber  Unremarkable kidneys  Workstation performed: [id number]     Signed by:    Jonathon Ortega DO   6/16/17       Signatures   Electronically signed by : FABY Perez; Jun 20 2017 11:16AM EST                       (Author)

## 2018-01-16 NOTE — MISCELLANEOUS
Message  called and spoke to mom, pt was seen in the ED for finger dislocation, mom states that pt is doing fine and called and made an appt to see ortho for f/u, no f/u needed for kidscare, mom will call back with any other issues      Active Problems   1  Acanthosis nigricans (701 2) (L83)  2  ADHD (attention deficit hyperactivity disorder), predominantly hyperactive impulsive type   (314 01) (F90 1)  3  Allergic rhinitis (477 9) (J30 9)  4  Asthma (493 90) (J45 909)  5  Eczema (692 9) (L30 9)  6  Gastroesophageal reflux disease (530 81) (K21 9)  7  Gynecomastia, male (611 1) (N62)  8  Insulin resistance (277 7) (E88 81)  9  Migraine headache (346 90) (G43 909)  10  Morbid or severe obesity due to excess calories (278 01) (E66 01)  11  Skin rash (782 1) (R21)  12  Stria (701 3) (L90 6)  13  Vitamin D deficiency (268 9) (E55 9)    Current Meds  1  CloNIDine HCl - 0 3 MG Oral Tablet; Therapy: 70YOC4864 to Recorded  2  Excedrin Migraine 250-250-65 MG Oral Tablet; two tablets PO daily as needed for   migraine headache; Therapy: 71MXE1730 to (Last Rx:31Mar2015)  Requested for: 50QLA2466 Ordered  3  Flovent  MCG/ACT Inhalation Aerosol; Inhale 2 puffs twice daily; Therapy: 46Jhk5317 to (Aracely Michel)  Requested for: 44UET6507; Last   Rx:02Nov2015 Ordered  4  Fluticasone Propionate 50 MCG/ACT Nasal Suspension (Flonase); 1 spray each nostril   1-2 times a day; Therapy: 39ZQQ1009 to (Evaluate:12Jan2016)  Requested for: 28EAK7998; Last   Rx:27Ahb7019; Status: ACTIVE - Renewal Denied Ordered  5  Ibuprofen 600 MG Oral Tablet; TAKE 1 TABLET EVERY 6 HOURS AS NEEDED; Therapy: (Recorded:31Mar2015) to Recorded  6  MetFORMIN HCl - 500 MG Oral Tablet; TAKE 2 TABLETS TWICE DAILY WITH MEALS; Therapy: 76Qhk1364 to Recorded  7  Montelukast Sodium 10 MG Oral Tablet; TAKE 1 TABLET BY MOUTH ONCE A DAY;    Therapy: 47Wxn2607 to (Evaluate:75Seg5328)  Requested for: 77EIY9148; Last   Rx:27Jan2016; Status: ACTIVE - Renewal Denied Ordered  8  Ventolin  (90 Base) MCG/ACT Inhalation Aerosol Solution; INHALE 2 PUFFS   EVERY 4 HOURS AS NEEDED FOR COUGH AND WHEEZING; Therapy: 89KCB1415 to (Teresa Jackson)  Requested for: 0490 39 07 81; Last   Rx:29Oct2015 Ordered  9  Vyvanse CAPS; Therapy: (334 2518) to Recorded  10  ZyrTEC Allergy 10 MG Oral Tablet; TAKE 1 TABLET AT BEDTIME; Therapy: 34Qbe9475 to (Evaluate:13Nov2015)  Requested for: 30Dtl5135; Last    Rx:38Uzl5302 Ordered    Allergies   1  Penicillin V Potassium SOLR   2  Animal dander - Cats  3  Animal dander - Dogs  4   Seasonal    Signatures   Electronically signed by : Cipriano Vickers RN; Mar  9 2016  3:16PM EST                       (Author)    Electronically signed by : Indu Celeste DO; Mar  9 2016  3:38PM EST                       (Acknowledgement)

## 2018-01-17 NOTE — MISCELLANEOUS
Reason For Visit  Reason For Visit Free Text Note Form: Mom requesting assistance with electric service shut off notice     Case Management Documentation [facility]:   Information obtained from the patient and Parent(s)  Patient is participating in [facility]  Action Plan: information provided  plan reviewed  Progress Note  Met with Patient and Mother in [facility] on provider's referral  Mother requesting assistance with electric service shut off  She stated has contacted different Agencies for assistance, [employer], [employer] and others, without any luck  Mom is awaiting call back from some of them  Mom reported shut off scheduled for today  Explained to Mom unfortunately patient does not meet medical criteria to prevent shut off  Encouraged Mom to continue to contact [employer] for assistance  Will remain available as needed  Active Problems    1  Acanthosis nigricans (701 2) (L83)   2  Acute pharyngitis (462) (J02 9)   3  ADHD (attention deficit hyperactivity disorder), predominantly hyperactive impulsive type   (314 01) (F90 1)   4  Allergic rhinitis (477 9) (J30 9)   5  Asthma (493 90) (J45 909)   6  Dislocation of proximal interphalangeal joint of left little finger, initial encounter (834 02)   (H62 575G)   7  Eczema (692 9) (L30 9)   8  Gastroesophageal reflux disease (530 81) (K21 9)   9  Gynecomastia, male (611 1) (N62)   10  Injury of little finger (959 5) (S69 90XA)   11  Insulin resistance (277 7) (E88 81)   12  Migraine headache (346 90) (G43 909)   13  Morbid or severe obesity due to excess calories (278 01) (E66 01)   14  Pain of finger of left hand (729 5) (M79 645)   15  Skin rash (782 1) (R21)   16  Stria (701 3) (L90 6)   17  Traumatic rupture of radial collateral ligament (848 9) (S53 20XA)   18  Vitamin D deficiency (268 9) (E55 9)    Current Meds   1  CloNIDine HCl - 0 3 MG Oral Tablet; Therapy: 32HNV6464 to Recorded   2   Excedrin Migraine 250-250-65 MG Oral Tablet; two tablets PO daily as needed for   migraine headache; Therapy: 41GWA8664 to (Last Rx:31Mar2015)  Requested for: 23XNL9884 Ordered   3  Flovent  MCG/ACT Inhalation Aerosol; Inhale 2 puffs twice daily; Therapy: 58Uok7847 to (Tia Davidson)  Requested for: 28WHC1391; Last   Rx:02Nov2015 Ordered   4  Fluticasone Propionate 50 MCG/ACT Nasal Suspension (Flonase); 1 spray each nostril   1-2 times a day; Therapy: 46TKE9676 to (Evaluate:12Jan2016)  Requested for: 77RWC4564; Last   Rx:14Sep2015; Status: ACTIVE - Renewal Denied Ordered   5  MetFORMIN HCl - 500 MG Oral Tablet; TAKE 2 TABLETS TWICE DAILY WITH MEALS; Therapy: 60Vgj4020 to Recorded   6  Montelukast Sodium 10 MG Oral Tablet; TAKE 1 TABLET BY MOUTH ONCE A DAY; Therapy: 23Jhm5440 to (Evaluate:26Feb2016)  Requested for: 69PBH8551; Last   Rx:27Jan2016; Status: ACTIVE - Renewal Denied Ordered   7  Ranitidine HCl - 300 MG Oral Capsule; TAKE 1 CAPSULE AT BEDTIME; Therapy: 74JJH8836 to (Evaluate:21Apr2016)  Requested for: 25Apr2016; Last   Rx:22Mar2016 Ordered   8  Ventolin  (90 Base) MCG/ACT Inhalation Aerosol Solution; INHALE 2 PUFFS   EVERY 4 HOURS AS NEEDED FOR COUGH AND WHEEZING; Therapy: 34DMB8630 to (Candi Son)  Requested for: 21Apr2016; Last   Rx:29Oct2015; Status: ACTIVE - Renewal Denied Ordered   9  Vitamin D3 5000 UNIT Oral Capsule; take 1 capsule daily; Therapy: 84Uzc5169 to (Last Rx:78Amp7840)  Requested for: 35Mqx8528 Ordered   10  Vyvanse CAPS; Therapy: (Mariia Camejo) to Recorded   11  ZyrTEC Allergy 10 MG Oral Tablet; TAKE 1 TABLET AT BEDTIME; Therapy: 60Fhv8306 to (Evaluate:13Nov2015)  Requested for: 35Jnu7941; Last    Rx:24Att9149 Ordered    Allergies    1  Penicillin V Potassium SOLR    2  Animal dander - Cats   3  Animal dander - Dogs   4  Seasonal    Future Appointments    Date/Time Provider Specialty Site   05/18/2016 09:00 AM SHRUTI Chris   Orthopedic Surgery [facility]   06/14/2016 10:00 AM SHRUTI Mckeon  Gastroenterology [facility]   06/23/2016 04:00 PM SHRUTI Mckeon  Gastroenterology Peds [facility] PEDIATRIC GASTROENTEROLOGY     Signatures   Electronically signed by :  ZEB David; May 16 2016  3:02PM EST                       (Author)

## 2018-01-17 NOTE — RESULT NOTES
Message   Taskk to Jodi--US shows hepatosplenomegaly with slightly enlarged portal vein with appropriate direction of flow  Verified Results  * [address] Etienne [id number] 07:28AM HueyelvermeaganSandi Shabana Order Number: [id number]   Performing Comments: Please assess vessels by Doppler to R/O portal hypertension   - Patient Instructions: To schedule this appointment, please contact Central Scheduling at [phone]  Test Name Result Flag Reference   US ABDOMEN COMPLETE (Report)     ABDOMEN ULTRASOUND, COMPLETE     INDICATION: Prominent vessels seen on EGD  Evaluate for portal hypertension  COMPARISON: None     TECHNIQUE:  Real-time ultrasound of the abdomen was performed with a curvilinear transducer with both volumetric sweeps and still imaging techniques  FINDINGS:     PANCREAS: Visualized portions of the pancreas are within normal limits  AORTA AND IVC: Visualized portions are normal for patient age  LIVER:   Size: Enlarged  The liver measures 21 2 cm in the midclavicular line  Contour: Surface contour is smooth  Parenchyma: Borderline increased echogenicity right lobe which may reflect fatty infiltration  No evidence of suspicious mass  The main portal vein is patent and hepatopetal  Portal vein is top normal caliber measuring 13 mm  Velocity in the portal vein measures 21 0 cm/s  There is appropriate directional flow within the visualized intrahepatic portal veins  The hepatic    veins are patent  BILIARY:   The gallbladder is normal in caliber  No wall thickening or pericholecystic fluid  No stones or sludge identified  Sonographic Dafne Farooq sign is negative  No intrahepatic biliary dilatation  CBD measures 4 mm  No choledocholithiasis  KIDNEY:    Right kidney measures 12 3 x 4 4 cm  Within normal limits  Left kidney measures 12 6 x 7 3 cm  Within normal limits  SPLEEN:    Measures 13 7 cm  Enlarged  No focal lesions  ASCITES: None  IMPRESSION:     Hepatosplenomegaly with possible mild fatty infiltration right lobe  No focal hepatic lesions  Portal vein is top normal caliber with hepatopedal flow  Otherwise, unremarkable abdominal ultrasound         Workstation performed: [id number]     Signed by:   Jose Cote DO   7/1/16       Signatures   Electronically signed by : SHRUTI Loera ; Jul 1 2016 11:15AM EST                       (Author)

## 2018-01-18 NOTE — MISCELLANEOUS
Message   Recorded as Task   Date: 04/19/2016 08:51 AM, Created By: Shante Amaya   Task Name: Medical Complaint Callback   Assigned To: [facility] tri[facility],Team   Regarding Patient: Porsha Jackson, Status: In Progress   CommentSara Aravind - 39 DDN 5571 8:51 AM    TASK CREATED  Caller: Vasile Hu, Mother; Medical Complaint; [phone]  MIGRAINE   [place]ina cerr[place] - 19 Apr 2016 9:15 AM    TASK IN PROGRESS   [place]ristina cerrato - 19 Apr 2016 9:27 AM    TASK EDITED  called and spoke to mom, she states that pt woke up this am wioth a bad migraine, mom states that pt does have a hx of this, and will be seeing davideo soon for this issue  mom states that she gave pain meds but nothing seems to be helping  went over migraince protocol for home care, mom states that she understands info, also told mom that if pain gets to bad that she needs to take pt to the ED for eval, mom states that she will do that if she eneds to, mom si also requesting school note, will put in chart for mom to  at earliest convience  mom will call back office with any other issues  PROTOCOL: : Headache- Pediatric Guideline     DISPOSITION: Home Care - Migraine headache, previously diagnosed     CARE ADVICE:      1 REASSURANCE: This headache is similar to previous migraine headaches that your child has experienced  1 REASSURANCE:   * It doesn`t sound like a serious headache  * Headaches are very common with viral illness, especially with colds  They usually resolve in 2 or 3 days  * Unexplained headaches can occur in children, just as they do in adults  They usually pass in a few hours or last up to a day  1 REASSURANCE: Muscle tension headaches occur in 20% of children and even more commonly in adults  2  PAIN MEDICINE: Give acetaminophen (e g , Tylenol) or ibuprofen for pain relief (see Dosage table)  Headaches due to fever are also helped by fever reduction     2 MIGRAINE MEDICATION:   * If your child`s doctor has prescribed a specific medication for migraine, give it as directed as soon as the migraine starts  * If not, ibuprofen is the best over-the-counter drug for migraine  Give ibuprofen now and repeat in 6 hours if needed (See Dosage Table)  2 SYMPTOMS: Muscle tension headaches give a feeling of tightness around the head  The neck muscles also become sore and tight  3 FOOD: Give fruit juice or food if your child is hungry or hasn`t eaten in more than 4 hours (Reason: Skipping a meal can cause a headache in many children)  3 SLEEP: Have your child lie down in a dark, quiet place and try to fall asleep  People with migraine often awaken from sleep with their migraine gone  3 CAUSES: Muscle tension headaches can be caused by staying in one position for a long time, such as with reading or using a computer  Other children get tension headaches as a reaction to stress from school or worrying too much about family issues  4 REST: Lie down in a quiet place and relax until feeling better  4  CALL BACK IF:  * Headache becomes much worse than usual  * Headache lasts longer than usual   4  TREATMENT: Care advice 2, 4, 5 and 6 for `Mild Headache` are usually also effective for muscle tension headaches  5 COLD PACK: Apply a cold wet washcloth or cold pack to the forehead for 20 minutes  5 PREVENTION:  * If something is bothering your child, help him talk about it and get it off his mind  * Teach your child to take breaks from activities that require sustained concentration  * Encourage your child to do relaxation exercises during the breaks  * Teach your child the importance of getting adequate sleep  * If over-achievement causes headaches, help your child find more balance  6 STRETCHING: Stretch and massage any tight neck muscles     7 CALL BACK IF:  * Headache becomes severe  * Vomiting occurs  * Unexplained headache lasts over 24 hours  * Headache with a viral illness lasts over 3 days   * Your child becomes worse        Active Problems   1  Acanthosis nigricans (701 2) (L83)  2  ADHD (attention deficit hyperactivity disorder), predominantly hyperactive impulsive type   (314 01) (F90 1)  3  Allergic rhinitis (477 9) (J30 9)  4  Asthma (493 90) (J45 909)  5  Dislocation of proximal interphalangeal joint of left little finger, initial encounter (834 02)   (G01 013S)  6  Eczema (692 9) (L30 9)  7  Gastroesophageal reflux disease (530 81) (K21 9)  8  Gynecomastia, male (611 1) (N62)  9  Insulin resistance (277 7) (E88 81)  10  Migraine headache (346 90) (G43 909)  11  Morbid or severe obesity due to excess calories (278 01) (E66 01)  12  Pain of finger of left hand (729 5) (M79 645)  13  Skin rash (782 1) (R21)  14  Stria (701 3) (L90 6)  15  Traumatic rupture of radial collateral ligament (848 9) (S53 20XA)  16  Vitamin D deficiency (268 9) (E55 9)    Current Meds  1  CloNIDine HCl - 0 3 MG Oral Tablet; Therapy: 02HGH9974 to Recorded  2  Excedrin Migraine 250-250-65 MG Oral Tablet; two tablets PO daily as needed for   migraine headache; Therapy: 58REO3182 to (Last Rx:31Mar2015)  Requested for: 24NEF8476 Ordered  3  Flovent  MCG/ACT Inhalation Aerosol; Inhale 2 puffs twice daily; Therapy: 14Sep2015 to (Bernadine Pascual)  Requested for: 06PRK3088; Last   Rx:02Nov2015 Ordered  4  Fluticasone Propionate 50 MCG/ACT Nasal Suspension (Flonase); 1 spray each nostril   1-2 times a day; Therapy: 98BSL9374 to (Evaluate:12Jan2016)  Requested for: 55THD4093; Last   Rx:22Twi0844; Status: ACTIVE - Renewal Denied Ordered  5  Ibuprofen 600 MG Oral Tablet; TAKE 1 TABLET EVERY 6 HOURS AS NEEDED; Therapy: (Recorded:31Mar2015) to Recorded  6  MetFORMIN HCl - 500 MG Oral Tablet; TAKE 2 TABLETS TWICE DAILY WITH MEALS; Therapy: 03Sep2014 to Recorded  7  Montelukast Sodium 10 MG Oral Tablet; TAKE 1 TABLET BY MOUTH ONCE A DAY;    Therapy: 74Nmy8581 to (Evaluate:24Fsb5302)  Requested for: 57FFG0583; Last   GS:09CXR9671; Status: ACTIVE - Renewal Denied Ordered  8  Ranitidine HCl - 300 MG Oral Capsule; TAKE 1 CAPSULE AT BEDTIME; Therapy: 86WGO8723 to (Evaluate:21Apr2016)  Requested for: 63QCT5231; Last   Rx:22Mar2016 Ordered  9  Ventolin  (90 Base) MCG/ACT Inhalation Aerosol Solution; INHALE 2 PUFFS   EVERY 4 HOURS AS NEEDED FOR COUGH AND WHEEZING; Therapy: 54FSG7140 to (Estelita Stage)  Requested for: 60MBZ8128; Last   Rx:29Oct2015; Status: ACTIVE - Renewal Denied Ordered  10  Vitamin D3 5000 UNIT Oral Capsule; take 1 capsule daily; Therapy: 17Taa1489 to (Last Rx:53Xsf2491)  Requested for: 78Njx5602 Ordered  11  Vyvanse CAPS; Therapy: (450 39 173) to Recorded  12  ZyrTEC Allergy 10 MG Oral Tablet; TAKE 1 TABLET AT BEDTIME; Therapy: 59Xvh5293 to (Evaluate:30Yvf5334)  Requested for: 04Apx1130; Last    Rx:23Nwx4053 Ordered    Allergies   1  Penicillin V Potassium SOLR   2  Animal dander - Cats  3  Animal dander - Dogs  4  Seasonal    Signatures   Electronically signed by : Brennen Hurtado RN; Apr 19 2016  9:28AM EST                       (Author)    Electronically signed by : RIC Rodas;  Apr 19 2016 10:01AM EST                       (Author)

## 2018-01-18 NOTE — MISCELLANEOUS
Message  Return to work or school:      He is able to return to school on 04/20/2016     mom called office today 04/19/2016 for medical advice, pt was NOT seen        Signatures   Electronically signed by : Zuleyka Dumont RN; Apr 19 2016  9:28AM EST                       (Author)

## 2018-01-18 NOTE — MISCELLANEOUS
Message   Recorded as Task   Date: 03/22/2016 01:09 PM, Created By: Madie Calix   Task Name: Med Renewal Request   Assigned To: [facility],Team   Regarding Patient: Mark Vera, Status: In Progress   Comment:   Thuy Kim - 22 Mar 2016 1:09 PM    TASK CREATED  Caller: remi, Mother; Renew Medication; [phone]  claude pt -refill on [place] - 22 Mar 2016 1:55 PM    TASK IN PROGRESS   Daniella Marion - 22 Mar 2016 2:02 PM    TASK EDITED  He has been off it for a while  A couple months off of it per mom  Refluxing off, no belly pain  On numerous meds  Can this be ordered again or do you want him to be seen? Daniella Marion - 22 Mar 2016 2:02 PM    TASK REASSIGNED: Previously Assigned To [facility],Team   Kaitlyn Burton - 22 Mar 2016 4:36 PM    TASK REPLIED TO: Previously Assigned To [address] [place] [address]  looks like he was referred to GI at his well visit in Sept  has he been seen there? I can reorder the ranitidine but if he is having issues he should be worked up by GI as recommended at last visit (will give 30 day supply, if symptoms persistent should see GI)  Thanks  Ike Valdovinos - 22 Mar 2016 5:01 PM    TASK EDITED  LM call back  Daniella Marion - 22 Mar 2016 5:56 PM    TASK EDITED  Mom informed of med and to see Dr Rod Alexandra as told in [facility]  MOM ACTED UNAWARE OF THAT SUGGESTION  Order in computer for Dr Dior Marie ? I told her [place], does that need to be changed? Daniella Marion - 22 Mar 2016 5:57 PM    TASK REASSIGNED: Previously Assigned To [facility] triage,Team   Michelle Anguiano - 22 Mar 2016 6:02 PM    TASK REPLIED TO: [facility]  new order is in for Dr Nik Escalante   1  Acanthosis nigricans (701 2) (L83)  2  ADHD (attention deficit hyperactivity disorder), predominantly hyperactive impulsive type   (314 01) (F90 1)  3   Allergic rhinitis (477 9) (J30 9)  4  Asthma (493 90) (J45 909)  5  Eczema (692 9) (L30 9)  6  Gastroesophageal reflux disease (530 81) (K21 9)  7  Gynecomastia, male (611 1) (N62)  8  Insulin resistance (277 7) (E88 81)  9  Migraine headache (346 90) (G43 909)  10  Morbid or severe obesity due to excess calories (278 01) (E66 01)  11  Skin rash (782 1) (R21)  12  Stria (701 3) (L90 6)  13  Vitamin D deficiency (268 9) (E55 9)    Current Meds  1  CloNIDine HCl - 0 3 MG Oral Tablet; Therapy: 84MAJ2115 to Recorded  2  Excedrin Migraine 250-250-65 MG Oral Tablet; two tablets PO daily as needed for   migraine headache; Therapy: 75PVF1087 to (Last Rx:31Mar2015)  Requested for: 44PVB1042 Ordered  3  Flovent  MCG/ACT Inhalation Aerosol; Inhale 2 puffs twice daily; Therapy: 14Sep2015 to (Shirley Right)  Requested for: 25DMO2006; Last   Rx:02Nov2015 Ordered  4  Fluticasone Propionate 50 MCG/ACT Nasal Suspension (Flonase); 1 spray each nostril   1-2 times a day; Therapy: 51NDY8314 to (Evaluate:12Jan2016)  Requested for: 19OOX7905; Last   Rx:14Sep2015; Status: ACTIVE - Renewal Denied Ordered  5  Ibuprofen 600 MG Oral Tablet; TAKE 1 TABLET EVERY 6 HOURS AS NEEDED; Therapy: (Recorded:31Mar2015) to Recorded  6  MetFORMIN HCl - 500 MG Oral Tablet; TAKE 2 TABLETS TWICE DAILY WITH MEALS; Therapy: 69Vlu7843 to Recorded  7  Montelukast Sodium 10 MG Oral Tablet; TAKE 1 TABLET BY MOUTH ONCE A DAY; Therapy: 60Rgz6758 to (Evaluate:41Otn3499)  Requested for: 66QYU7806; Last   Rx:27Jan2016; Status: ACTIVE - Renewal Denied Ordered  8  Ranitidine HCl - 300 MG Oral Capsule; TAKE 1 CAPSULE AT BEDTIME; Therapy: 11KRR3495 to (Evaluate:21Apr2016)  Requested for: 52XRA4108; Last   Rx:22Mar2016 Ordered  9  Ventolin  (90 Base) MCG/ACT Inhalation Aerosol Solution; INHALE 2 PUFFS   EVERY 4 HOURS AS NEEDED FOR COUGH AND WHEEZING; Therapy: 67FYT8384 to (Mario Moreau)  Requested for: 0490 39 07 81; Last   Rx:29Oct2015 Ordered  10   Vyvanse CAPS;    Therapy: (Recorded:07Oct2015) to Recorded  11  ZyrTEC Allergy 10 MG Oral Tablet; TAKE 1 TABLET AT BEDTIME; Therapy: 03Sep2014 to (Evaluate:13Nov2015)  Requested for: 14Sep2015; Last    Rx:14Sep2015 Ordered    Allergies   1  Penicillin V Potassium SOLR   2  Animal dander - Cats  3  Animal dander - Dogs  4   Seasonal    Signatures   Electronically signed by : Josie Sy, ; Mar 22 2016  6:04PM EST                       (Author)    Electronically signed by : Herberth King, [facility]; Mar 23 2016  9:03AM EST                       (Author)

## 2018-01-23 NOTE — MISCELLANEOUS
Message  Return to work or school:   Tr Burk is under my professional care  He was seen in my office on 12/19/2017   He is able to return to work on  12/20/2017    He is able to perform activities of daily living without limitations  , He can perform work without limitations  Weight Bearing Status: Full Weight-Bearing  He reports he was out of work on Friday Dec 15th 2017 and Monday Dec 18th 2017, for this medical condition  FABY Murcia  Future Appointments    Signatures   Electronically signed by : Ramirez Napier; Dec 19 2017  4:12PM EST                       (Author)    Electronically signed by : Ramirez Napier; Dec 19 2017  4:14PM EST                       (Author)    Electronically signed by :  SHRUTI Wright ; Dec 19 2017 11:56PM EST                       (Review)

## 2018-01-24 VITALS
HEIGHT: 73 IN | WEIGHT: 315 LBS | TEMPERATURE: 97.8 F | HEART RATE: 104 BPM | BODY MASS INDEX: 41.75 KG/M2 | SYSTOLIC BLOOD PRESSURE: 114 MMHG | DIASTOLIC BLOOD PRESSURE: 82 MMHG

## 2018-02-16 DIAGNOSIS — J45.20 MILD INTERMITTENT ASTHMA WITHOUT COMPLICATION: Primary | ICD-10-CM

## 2018-02-16 DIAGNOSIS — R10.13 DYSPEPSIA: ICD-10-CM

## 2018-02-19 ENCOUNTER — TELEPHONE (OUTPATIENT)
Dept: URGENT CARE | Age: 18
End: 2018-02-19

## 2018-02-19 RX ORDER — RANITIDINE 300 MG/1
CAPSULE ORAL
Qty: 90 CAPSULE | Refills: 0 | Status: SHIPPED | OUTPATIENT
Start: 2018-02-19 | End: 2018-04-19 | Stop reason: SDUPTHER

## 2018-02-19 RX ORDER — DEXAMETHASONE 4 MG/1
TABLET ORAL
Qty: 1 INHALER | Refills: 2 | Status: SHIPPED | OUTPATIENT
Start: 2018-02-19 | End: 2018-06-27 | Stop reason: SDUPTHER

## 2018-03-16 DIAGNOSIS — K58.0 IRRITABLE BOWEL SYNDROME WITH DIARRHEA: Primary | ICD-10-CM

## 2018-03-16 DIAGNOSIS — J45.20 MILD INTERMITTENT ASTHMA WITHOUT COMPLICATION: ICD-10-CM

## 2018-03-16 DIAGNOSIS — K58.9 IRRITABLE BOWEL SYNDROME, UNSPECIFIED TYPE: Primary | ICD-10-CM

## 2018-03-16 DIAGNOSIS — J45.909 UNCOMPLICATED ASTHMA, UNSPECIFIED ASTHMA SEVERITY, UNSPECIFIED WHETHER PERSISTENT: ICD-10-CM

## 2018-03-16 RX ORDER — DICYCLOMINE HCL 20 MG
20 TABLET ORAL 3 TIMES DAILY
Qty: 270 TABLET | Refills: 0 | Status: SHIPPED | OUTPATIENT
Start: 2018-03-16 | End: 2018-04-19 | Stop reason: SDUPTHER

## 2018-03-16 RX ORDER — RANITIDINE 300 MG/1
CAPSULE ORAL
COMMUNITY
Start: 2011-06-20 | End: 2018-04-19 | Stop reason: SDUPTHER

## 2018-03-16 RX ORDER — FLUTICASONE PROPIONATE 50 MCG
1 SPRAY, SUSPENSION (ML) NASAL
COMMUNITY
Start: 2012-06-11 | End: 2018-04-19 | Stop reason: SDUPTHER

## 2018-03-16 RX ORDER — POLYVINYL ALCOHOL 14 MG/ML
SOLUTION/ DROPS OPHTHALMIC
COMMUNITY
Start: 2018-03-16 | End: 2018-07-02 | Stop reason: SDUPTHER

## 2018-03-16 RX ORDER — SODIUM CHLORIDE 0.65 %
AEROSOL, SPRAY (ML) NASAL
COMMUNITY
Start: 2018-03-16 | End: 2018-07-02 | Stop reason: SDUPTHER

## 2018-03-16 RX ORDER — MONTELUKAST SODIUM 10 MG/1
1 TABLET ORAL DAILY
COMMUNITY
Start: 2015-09-14 | End: 2018-07-02 | Stop reason: SDUPTHER

## 2018-03-16 RX ORDER — PIMECROLIMUS 1 %
CREAM (GRAM) TOPICAL
COMMUNITY
Start: 2018-03-16 | End: 2018-07-02

## 2018-03-16 RX ORDER — ACETAMINOPHEN, ASPIRIN AND CAFFEINE 250; 250; 65 MG/1; MG/1; MG/1
TABLET, FILM COATED ORAL
COMMUNITY
Start: 2015-03-31

## 2018-03-16 RX ORDER — CLONIDINE HYDROCHLORIDE 0.3 MG/1
TABLET ORAL
COMMUNITY
Start: 2014-02-17 | End: 2018-04-19 | Stop reason: SDUPTHER

## 2018-03-16 RX ORDER — DICYCLOMINE HCL 20 MG
TABLET ORAL
COMMUNITY
Start: 2018-03-16 | End: 2018-03-16 | Stop reason: SDUPTHER

## 2018-03-16 RX ORDER — FLUTICASONE PROPIONATE 110 UG/1
2 AEROSOL, METERED RESPIRATORY (INHALATION) 2 TIMES DAILY
COMMUNITY
Start: 2015-09-14

## 2018-03-16 NOTE — TELEPHONE ENCOUNTER
This patient has needs to be evaluated for his asthma and IBS and it has not been done in a while  I have sent his meds to the pharmacy but he needs to RTC within the next 3 months, before he runs out of them  Thanks   kourtney

## 2018-03-19 RX ORDER — TRIAMCINOLONE ACETONIDE 1 MG/G
CREAM TOPICAL
COMMUNITY
Start: 2018-03-16 | End: 2018-07-02

## 2018-03-19 NOTE — TELEPHONE ENCOUNTER
Spoke to pt  Pt is aware meds were sent to pharmacy and pt will make an appt before he runs out of meds to be evaluated

## 2018-03-20 ENCOUNTER — OFFICE VISIT (OUTPATIENT)
Dept: INTERNAL MEDICINE CLINIC | Facility: CLINIC | Age: 18
End: 2018-03-20
Payer: COMMERCIAL

## 2018-03-20 VITALS
WEIGHT: 315 LBS | HEIGHT: 72 IN | TEMPERATURE: 98.1 F | SYSTOLIC BLOOD PRESSURE: 110 MMHG | DIASTOLIC BLOOD PRESSURE: 82 MMHG | BODY MASS INDEX: 42.66 KG/M2 | HEART RATE: 72 BPM

## 2018-03-20 DIAGNOSIS — M54.50 ACUTE RIGHT-SIDED LOW BACK PAIN WITHOUT SCIATICA: Primary | ICD-10-CM

## 2018-03-20 PROCEDURE — 99214 OFFICE O/P EST MOD 30 MIN: CPT | Performed by: NURSE PRACTITIONER

## 2018-03-20 RX ORDER — DICYCLOMINE HCL 20 MG
TABLET ORAL
Qty: 270 TABLET | Refills: 0 | Status: SHIPPED | OUTPATIENT
Start: 2018-03-20 | End: 2018-04-19 | Stop reason: SDUPTHER

## 2018-03-20 RX ORDER — NAPROXEN 500 MG/1
250 TABLET ORAL 2 TIMES DAILY WITH MEALS
Qty: 360 TABLET | Refills: 0 | Status: SHIPPED | OUTPATIENT
Start: 2018-03-20 | End: 2019-06-03 | Stop reason: SDUPTHER

## 2018-03-20 RX ORDER — CYCLOBENZAPRINE HCL 5 MG
10 TABLET ORAL 3 TIMES DAILY PRN
Qty: 30 TABLET | Refills: 0 | Status: SHIPPED | OUTPATIENT
Start: 2018-03-20

## 2018-03-20 NOTE — LETTER
March 20, 2018     Patient: Carly Hart   YOB: 2000   Date of Visit: 3/20/2018       To Whom it May Concern:    Nina Alex is under my professional care  He was seen in my office on 3/20/2018  He may return to work on 03/22/2018  He missed work yesterday 03/19th 2018 for this same medical condition  If you have any questions or concerns, please don't hesitate to call           Sincerely,          FABY Yadav        CC: No Recipients

## 2018-03-20 NOTE — PATIENT INSTRUCTIONS
Acute Low Back Pain   AMBULATORY CARE:   Acute low back pain  is sudden discomfort in your lower back area that lasts for up to 6 weeks  The discomfort makes it difficult to tolerate activity  Common symptoms include the following:   · Back stiffness or spasms    · Pain down the back or side of one leg    · Holding yourself in an unusual position or posture to decrease your back pain    · Not being able to find a sitting position that is comfortable    · Slow increase in your pain for 24 to 48 hours after you stress your back    · Tenderness on your lower back or severe pain when you move your back  Seek immediate care for the following symptoms:   · Severe pain    · Sudden stiffness and heaviness in both buttocks down to both legs    · Numbness or weakness in one leg, or pain in both legs    · Numbness in your genital area or across your lower back    · Unable to control your urine or bowel movements  Contact your healthcare provider if:   · You have a fever  · You have pain at night or when you rest     · Your pain does not get better with treatment  · You have pain that worsens when you cough or sneeze  · You suddenly feel something pop or snap in your back  · You have questions or concerns about your condition or care  The goal of treatment for acute low back pain  is to relieve your pain and help you tolerate activity  Most people with acute lower back pain get better within 4 to 6 weeks  You may need any of the following:  · Acetaminophen  decreases pain  It is available without a doctor's order  Ask how much to take and how often to take it  Follow directions  Acetaminophen can cause liver damage if not taken correctly  · NSAIDs  help decrease swelling and pain  This medicine is available with or without a doctor's order  NSAIDs can cause stomach bleeding or kidney problems in certain people   If you take blood thinner medicine, always ask your healthcare provider if NSAIDs are safe for you  Always read the medicine label and follow directions  · Prescription pain medicine  may be given  Ask your healthcare provider how to take this medicine safely  · Muscle relaxers  decrease pain by relaxing the muscles in your lower spine  Manage your symptoms:   · Stay active  as much as you can without causing more pain  Bed rest could make your back pain worse  Start with some light exercises such as walking  Avoid heavy lifting until your pain is gone  Ask for more information about the activities or exercises that are right for you  · Ice  helps decrease swelling, pain, and muscle spams  Put crushed ice in a plastic bag  Cover it with a towel  Place it on your lower back for 20 to 30 minutes every 2 hours  Do this for about 2 to 3 days after your pain starts, or as directed  · Heat  helps decrease pain and muscle spasms  Start to use heat after treatment with ice has stopped  Use a small towel dampened with warm water or a heating pad, or sit in a warm bath  Apply heat on the area for 20 to 30 minutes every 2 hours for as many days as directed  Alternate heat and ice  Prevent acute low back pain:   · Use proper body mechanics  ¨ Bend at the hips and knees when you  objects  Do not bend from the waist  Use your leg muscles as you lift the load  Do not use your back  Keep the object close to your chest as you lift it  Try not to twist or lift anything above your waist     ¨ Change your position often when you stand for long periods of time  Rest one foot on a small box or footrest, and then switch to the other foot often  ¨ Try not to sit for long periods of time  When you do, sit in a straight-backed chair with your feet flat on the floor  Never reach, pull, or push while you are sitting  · Do exercises that strengthen your back muscles  Warm up before you exercise  Ask your healthcare provider the best exercises for you  · Maintain a healthy weight    Ask your healthcare provider how much you should weigh  Ask him to help you create a weight loss plan if you are overweight  Follow up with your healthcare provider as directed:  Return for a follow-up visit if you still have pain after 1 to 3 weeks of treatment  You may need to visit an orthopedist if your back pain lasts more than 12 weeks  Write down your questions so you remember to ask them during your visits  © 2017 [employer] Information is for End User's use only and may not be sold, redistributed or otherwise used for commercial purposes  All illustrations and images included in CareNotes® are the copyrighted property of [employer]  or [address]  The above information is an  only  It is not intended as medical advice for individual conditions or treatments  Talk to your doctor, nurse or pharmacist before following any medical regimen to see if it is safe and effective for you

## 2018-03-20 NOTE — PROGRESS NOTES
Assessment/Plan:     Diagnoses and all orders for this visit:    Acute right-sided low back pain without sciatica  -     naproxen (NAPROSYN) 500 mg tablet; Take 0 5 tablets (250 mg total) by mouth 2 (two) times a day with meals  -     cyclobenzaprine (FLEXERIL) 5 mg tablet; Take 2 tablets (10 mg total) by mouth 3 (three) times a day as needed for muscle spasms (can cause drowsiness)  -     Ambulatory referral to Physical Therapy; Future  -     Patient is advised to contact his HR department at his job and find out if they have a contract with an [facility] that he should be going to   Verbalized understanding  Subjective: patient presents to the clinic for low back pain that started yesterday  He reports he was lifting some heavy boxes at his job when the pain started but he ignored  Than when he went home the pain increased, and this morning had gotten worse  At the clinic today, the pain is 7/10, achy pain, no radiation, mild muscle spasm on the low back muscles  No previous Hx of low back pain  Took some tylenol 650 mg last night and this morning and it did not help with the pain  He reports b/c of the pain he cannot sit upright and has to lie down for comfort to the low back  Denies change in bowel and bladder function  No weakness or the legs  Patient ID: Tiana Flowers is a 21 y o  male  HPI    The following portions of the patient's history were reviewed and updated as appropriate: allergies, current medications, past family history, past medical history, past social history, past surgical history and problem list     Review of Systems   Constitutional: Negative for appetite change, fatigue, fever and unexpected weight change  Eyes: Negative for photophobia, pain, itching and visual disturbance  Respiratory: Negative for cough, chest tightness, shortness of breath and wheezing  Cardiovascular: Negative for chest pain and palpitations     Gastrointestinal: Negative for abdominal pain, constipation, diarrhea, nausea and vomiting  Musculoskeletal: Positive for back pain (as described above) and myalgias (low back pain)  Negative for gait problem, neck pain and neck stiffness  Objective:      /82 (BP Location: Left arm, Patient Position: Sitting, Cuff Size: Adult)   Pulse 72   Temp 98 1 °F (36 7 °C) (Oral)   Ht 6' (1 829 m)   Wt (!) 155 kg (342 lb 9 5 oz)   BMI 46 46 kg/m²          Physical Exam   Constitutional: He is oriented to person, place, and time  He appears well-developed and well-nourished  No distress  HENT:   Head: Normocephalic and atraumatic  Right Ear: External ear normal    Left Ear: External ear normal    Neck: Normal range of motion  Neck supple  No thyromegaly present  Cardiovascular: Normal rate, regular rhythm and normal heart sounds  No murmur heard  Pulmonary/Chest: Effort normal and breath sounds normal  No respiratory distress  He has no wheezes  Musculoskeletal: He exhibits tenderness (palpation of the lower back elicits pain on the R gluteus demetria muscle  No onbservable swelling to this site  Straight leg test raise is neg on BLEs  No weakness  Pulses 2/2  )  He exhibits no edema  Limited ROM at the waist with both flexion and extension secondary to pain  Lymphadenopathy:     He has no cervical adenopathy  Neurological: He is alert and oriented to person, place, and time  He has normal reflexes  He exhibits normal muscle tone  Skin: He is not diaphoretic  Psychiatric: He has a normal mood and affect   His behavior is normal  Judgment and thought content normal

## 2018-04-11 ENCOUNTER — TELEPHONE (OUTPATIENT)
Dept: FAMILY MEDICINE CLINIC | Facility: CLINIC | Age: 18
End: 2018-04-11

## 2018-04-11 NOTE — TELEPHONE ENCOUNTER
PATIENT WILL BE CALLING BACK TO GIVE US ADDRESS OF LOCATION  I SEARCHED It, PATIENT WILL BE GOING TO  [facility]  SPOKE TO THEM AND THEY STATE NO INSURANCE REFERRAL REQUIRED FOR THEM  PLEASE RELAY THIS TO PATIENT'S MOTHER  NO FURTHER STEPS  PATIENT IS READY FOR APPT

## 2018-04-12 ENCOUNTER — EVALUATION (OUTPATIENT)
Dept: PHYSICAL THERAPY | Facility: REHABILITATION | Age: 18
End: 2018-04-12
Payer: COMMERCIAL

## 2018-04-12 DIAGNOSIS — M54.50 ACUTE RIGHT-SIDED LOW BACK PAIN WITHOUT SCIATICA: ICD-10-CM

## 2018-04-12 PROCEDURE — G8982 BODY POS GOAL STATUS: HCPCS | Performed by: PHYSICAL THERAPIST

## 2018-04-12 PROCEDURE — 97161 PT EVAL LOW COMPLEX 20 MIN: CPT | Performed by: PHYSICAL THERAPIST

## 2018-04-12 PROCEDURE — G8981 BODY POS CURRENT STATUS: HCPCS | Performed by: PHYSICAL THERAPIST

## 2018-04-12 NOTE — PROGRESS NOTES
PT Evaluation     Today's date: 2018  Patient name: Fuad Tejada  : 2000  MRN: [id number]  Referring provider: FABY Napier  Dx:   Encounter Diagnosis     ICD-10-CM    1  Acute right-sided low back pain without sciatica M54 5 Ambulatory referral to Physical Therapy       Start Time: 1015  Stop Time: 1050  Total time in clinic (min): 35 minutes    Assessment  Impairments: abnormal gait, abnormal muscle firing, abnormal or restricted ROM, abnormal movement, activity intolerance, impaired physical strength, lacks appropriate home exercise program, pain with function and weight-bearing intolerance    Assessment details: Fuad Tejada is a 21 y o  male who presents with pain, decreased strength, decreased joint mobility and postural  dysfunction  Due to these impairments, Patient has difficulty performing a/iadls, recreational activities, work-related activities and engaging in social activities  Patient's clinical presentation is consistent with their referring diagnosis of low back pain  Patient would benefit from skilled physical therapy to address their aforementioned impairments, improve their level of function and to improve their overall quality of life  Understanding of Dx/Px/POC: excellent  Goals  Short Term Goals: to be achieved by 4 weeks  1) Patient to be independent with basic HEP  2) Decrease pain to 2/10 at its worst   3) Increase lumbar spine ROM by 0% in all deficient planes  4) Increase LE strength by 1/2 MMT grade in all deficient planes  5) Patient to report decreased sleep interruption secondary to pain  Long Term Goals: to be achieved by discharge  1) FOTO equal to or greater than 88   2) Patient to be independent with comprehensive HEP  3) Abolish pain for improved quality of life  4) Lumbar spine ROM WNL all planes to improve a/iadls  5) Patient to report no sleep interruption secondary to pain        Plan  Patient would benefit from: skilled PT  Planned modality interventions: biofeedback, cryotherapy, TENS, thermotherapy: hydrocollator packs and unattended electrical stimulation  Planned therapy interventions: abdominal trunk stabilization, activity modification, ADL retraining, ADL training, behavior modification, body mechanics training, breathing training, functional ROM exercises, home exercise program, IADL retraining, joint mobilization, manual therapy, massage, motor coordination training, neuromuscular re-education, patient education, postural training, self care, strengthening, stretching, therapeutic activities, therapeutic exercise and transfer training  Frequency: 2-3x week  Duration in weeks: 12  Treatment plan discussed with: patient and family        Subjective Evaluation    History of Present Illness  Onset date: 4 weeks ago  Mechanism of injury: Patient reports that approximately 4 weeks ago he was stowing items under a cubby at work  Patient's work required repetitive overhead lifting and bending with weights ranging from less than a pound to approximately 50 lbs  Patient experienced no acute pain while at work, but the following morning he developed sharp pain over the right side of his lower back  Patient went to an Urgent Care where he was prescribed muscle relaxants and Naprosyn, which minimally helped to improve symptoms  Patient's sharp/grabbing pain has since dissipated and now he has a constant sensation of tightness over his right lower back  Patient's tightness is not alleviated or aggravated with any positioning  At baseline, Patient develops low back pain with prolonged ambulation greater than 90 minutes  Patient has also noticed decreased sensation over his right lower back when itching his back  Patient denies bowel/bladder changes or drop attacks    Pain  Current pain ratin  At best pain ratin  At worst pain ratin  Location: right-sided low back  Quality: tight (constant)  Exacerbated by: prolonged ambulation  Progression: improved      Diagnostic Tests  No diagnostic tests performed  Treatments  Previous treatment: medication (Naprosyn, muscle relaxant)  Patient Goals  Patient/family treatment goals: alleviate tightness of lower back  Objective     Postural Observations  Seated posture: fair  Standing posture: fair        Tenderness     Lumbar Spine  Tenderness in the right transverse process (L4-5)  Neurological Testing     Sensation     Lumbar   Left   Intact: light touch    Right   Diminished: light touch    Comments   Right light touch: diminished over right lower back    Active Range of Motion     Lumbar   Normal active range of motion    Strength/Myotome Testing     Lumbar   Left   Normal strength    Right   Normal strength    Tests       Thoracic   Negative slump  Lumbar   Negative slump  Left   Negative crossed SLR, passive SLR and quadrant  Right   Negative crossed SLR, passive SLR and quadrant  Ambulation     Observational Gait   Gait: within functional limits     Functional Assessment     Comments  Bilateral squat: poor technique  Lifting technique: poor with increased lumbar flexion without use of knees to bend      Flowsheet Rows    Flowsheet Row Most Recent Value   PT/OT G-Codes   Current Score  84   Projected Score  88   FOTO information reviewed  Yes   Assessment Type  Evaluation   G code set  Changing & Maintaining Body Position   Changing and Maintaining Body Position Current Status ()  CI   Changing and Maintaining Body Position Goal Status ()  CI          Precautions: asthma    Daily Treatment Diary     Manual  4/12            L4-5 unilateral PA mobs - Right             S/l lumbar rotation mobs             Right lumbar paraspinal STM                                           Exercise Diary  4/12            VG with shoulder ext  Supine LTR             Seated PB multif  Rot  BFKO             Quad  Alt  UE flex  Modalities

## 2018-04-16 ENCOUNTER — OFFICE VISIT (OUTPATIENT)
Dept: PHYSICAL THERAPY | Facility: REHABILITATION | Age: 18
End: 2018-04-16
Payer: COMMERCIAL

## 2018-04-16 DIAGNOSIS — M54.50 ACUTE RIGHT-SIDED LOW BACK PAIN WITHOUT SCIATICA: Primary | ICD-10-CM

## 2018-04-16 PROCEDURE — 97140 MANUAL THERAPY 1/> REGIONS: CPT | Performed by: PHYSICAL THERAPIST

## 2018-04-16 PROCEDURE — 97112 NEUROMUSCULAR REEDUCATION: CPT | Performed by: PHYSICAL THERAPIST

## 2018-04-16 PROCEDURE — 97110 THERAPEUTIC EXERCISES: CPT | Performed by: PHYSICAL THERAPIST

## 2018-04-16 NOTE — PROGRESS NOTES
Daily Note     Today's date: 2018  Patient name: Christopher Melendez  : 2000  MRN: [id number]  Referring provider: FABY Villanueva  Dx:   Encounter Diagnosis     ICD-10-CM    1  Acute right-sided low back pain without sciatica M54 5        Start Time: 930  Stop Time: 1025  Total time in clinic (min): 55 minutes    Subjective: Patient reports that his back feels the most tight in the morning  Objective: See treatment diary below      Assessment: Tolerated treatment well  Patient demonstrated fatigue post treatment and would benefit from continued PT  Patient reporting significant reduction of right-sided low back tightness following manual techniques  Plan: Continue per plan of care  Progress treatment as tolerated  Precautions: asthma    Daily Treatment Diary     Manual             L4-5 unilateral PA mobs - Right  GR           S/l lumbar rotation mobs  GR           Right lumbar paraspinal STM  GR                                         Exercise Diary             VG with shoulder ext  5' L7           Supine LTR  10x10" b/l           Seated PB multif  Rot  20x 5" GTB b/l           BFKO  20x           Quad  Alt  UE flex    20x                                                                                                                                                                                                                  Modalities

## 2018-04-19 ENCOUNTER — OFFICE VISIT (OUTPATIENT)
Dept: GASTROENTEROLOGY | Facility: CLINIC | Age: 18
End: 2018-04-19
Payer: COMMERCIAL

## 2018-04-19 VITALS
DIASTOLIC BLOOD PRESSURE: 90 MMHG | TEMPERATURE: 98.5 F | SYSTOLIC BLOOD PRESSURE: 158 MMHG | HEIGHT: 72 IN | BODY MASS INDEX: 42.66 KG/M2 | HEART RATE: 92 BPM | WEIGHT: 315 LBS

## 2018-04-19 DIAGNOSIS — R16.2 HEPATOSPLENOMEGALY: ICD-10-CM

## 2018-04-19 DIAGNOSIS — I15.8 OTHER SECONDARY HYPERTENSION: ICD-10-CM

## 2018-04-19 DIAGNOSIS — K21.9 GASTROESOPHAGEAL REFLUX DISEASE, ESOPHAGITIS PRESENCE NOT SPECIFIED: ICD-10-CM

## 2018-04-19 DIAGNOSIS — K76.0 NONALCOHOLIC FATTY LIVER DISEASE: ICD-10-CM

## 2018-04-19 DIAGNOSIS — E66.01 MORBID OBESITY (HCC): ICD-10-CM

## 2018-04-19 DIAGNOSIS — K58.0 IRRITABLE BOWEL SYNDROME WITH DIARRHEA: Primary | ICD-10-CM

## 2018-04-19 PROCEDURE — 99214 OFFICE O/P EST MOD 30 MIN: CPT | Performed by: NURSE PRACTITIONER

## 2018-04-19 RX ORDER — DICYCLOMINE HCL 20 MG
20 TABLET ORAL 3 TIMES DAILY PRN
Qty: 90 TABLET | Refills: 3 | Status: SHIPPED | OUTPATIENT
Start: 2018-04-19

## 2018-04-19 RX ORDER — RANITIDINE 300 MG/1
300 CAPSULE ORAL 2 TIMES DAILY
Qty: 60 CAPSULE | Refills: 3 | Status: SHIPPED | OUTPATIENT
Start: 2018-04-19 | End: 2019-06-03 | Stop reason: SDUPTHER

## 2018-04-19 NOTE — PATIENT INSTRUCTIONS
Cristóbal Dickens continues with episodic irritable bowel syndrome diarrhea predominant  To use dicyclomine every 6 - 8 hours as needed  You may continue He is having no nausea or vomiting but occasionally will have reflux symptoms  We would like you to increase her ranitidine to twice daily  He has continued to gain weight and has stopped his metformin because he is no longer seeing Adolescent Medicine  Today we will perform a limited ultrasound to evaluate his liver and spleen and obtain serology to evaluate liver function studies and lipid profile with hemoglobin A1c and fasting insulin  Today we also noticed that he is experiencing hypertension but has no symptoms of it thus far  We will be referring him to adult Endocrinology, adult Cardiology, and also transitioning him to adult gastroenterology

## 2018-04-19 NOTE — PROGRESS NOTES
Assessment/Plan:    Christal continues with episodic irritable bowel syndrome diarrhea predominant  He is having no nausea or vomiting but occasionally will have reflux symptoms  He has continued to gain weight and has stopped his metformin because he is no longer seeing Adolescent Medicine  Today we will perform a limited ultrasound to evaluate his liver and spleen and obtain serology to evaluate liver function studies and lipid profile with hemoglobin A1c and fasting insulin  Today we also noticed that he is experiencing hypertension but has no symptoms of it thus far  We will be referring him to adult Endocrinology, adult Cardiology, and also transitioning him to adult gastroenterology  Diagnoses and all orders for this visit:    Irritable bowel syndrome with diarrhea  -     Ambulatory referral to Gastroenterology; Future    Morbid obesity ([facility] [address] )  -     US abdomen limited; Future  -     Insulin, fasting; Future  -     HEMOGLOBIN A1C W/ EAG ESTIMATION; Future  -     Lipid panel; Future  -     Ambulatory referral to Endocrinology; Future    Hepatosplenomegaly    Nonalcoholic fatty liver disease  -     Comprehensive metabolic panel; Future  -     US abdomen limited; Future  -     Ambulatory referral to Gastroenterology; Future    Gastroesophageal reflux disease, esophagitis presence not specified  -     ranitidine (ZANTAC) 300 MG capsule; Take 1 capsule (300 mg total) by mouth 2 (two) times a day  -     Ambulatory referral to Gastroenterology; Future    Other secondary hypertension  -     Ambulatory referral to Cardiology; Future          Subjective:      Patient ID: Amy Moreno is a 21 y o  male  Sharyle Race was seen in follow-up after 7 month interval for morbid obesity with irritable bowel syndrome, diarrhea predominant, non alcoholic fatty liver disease, and esophageal reflux  He has had persistent episodes of belly pain and diarrhea    He does know some food intolerance is which trigger these events but occasionally they would happen randomly when he feels as though he has had a healthy day of eating  He has difficulty occasionally with feeling burning with the diarrhea  He has had no nausea or vomiting and continues on ranitidine once a day  He has stopped following up with adolescent Medicine and is no longer taking his metformin  Today we see that he has hypertension  We have discussed today reassessing his liver function studies and fatty liver with serology and ultrasound diagnostics  We would like to increase his ranitidine to twice a day  We discussed having him follow up with adult Endocrinology and adult Cardiology for a full assessment  Additionally we will be transitioning him to adult GI, Dr Valentino Stanford  The following portions of the patient's history were reviewed and updated as appropriate: allergies, current medications, past family history, past medical history, past social history, past surgical history and problem list     Review of Systems   Constitutional: Positive for unexpected weight change  Negative for activity change, appetite change and fatigue  HENT: Negative for congestion, rhinorrhea and trouble swallowing  Eyes: Negative  Respiratory: Negative for cough and wheezing  Cardiovascular: Negative for palpitations  Gastrointestinal: Positive for abdominal pain (intermittent), diarrhea (intermittent) and rectal pain (occasionally with diarrhea)  Negative for abdominal distention, blood in stool, constipation, nausea and vomiting  Genitourinary: Negative  Musculoskeletal: Negative for arthralgias and myalgias  Skin: Negative for rash  Allergic/Immunologic: Negative for environmental allergies and food allergies  Neurological: Negative for dizziness, light-headedness and headaches  Psychiatric/Behavioral: Negative for behavioral problems and sleep disturbance  The patient is not nervous/anxious            Objective:      /90 (BP Location: Right arm)   Pulse 92   Temp 98 5 °F (36 9 °C) (Temporal)   Ht 6' 0 09" (1 831 m)   Wt (!) 156 kg (343 lb 0 6 oz)   BMI 46 41 kg/m²          Physical Exam   Constitutional: He is oriented to person, place, and time  He appears well-developed and well-nourished  No distress (obese)  HENT:   Head: Normocephalic and atraumatic  Eyes: Conjunctivae are normal    Cardiovascular: Normal rate, regular rhythm and normal heart sounds  No murmur heard  Pulmonary/Chest: Effort normal and breath sounds normal  He has no wheezes  Abdominal: Soft  He exhibits no distension (obese)  There is no hepatomegaly  There is no tenderness  There is no guarding  Neurological: He is alert and oriented to person, place, and time  Skin: Skin is warm and dry  No rash noted  Psychiatric: He has a normal mood and affect  His behavior is normal    Nursing note and vitals reviewed

## 2018-04-20 ENCOUNTER — OFFICE VISIT (OUTPATIENT)
Dept: PHYSICAL THERAPY | Facility: REHABILITATION | Age: 18
End: 2018-04-20
Payer: COMMERCIAL

## 2018-04-20 DIAGNOSIS — E66.01 MORBID OBESITY (HCC): Primary | ICD-10-CM

## 2018-04-20 DIAGNOSIS — M54.50 ACUTE RIGHT-SIDED LOW BACK PAIN WITHOUT SCIATICA: Primary | ICD-10-CM

## 2018-04-20 PROCEDURE — 97112 NEUROMUSCULAR REEDUCATION: CPT | Performed by: PHYSICAL THERAPIST

## 2018-04-20 PROCEDURE — 97140 MANUAL THERAPY 1/> REGIONS: CPT | Performed by: PHYSICAL THERAPIST

## 2018-04-20 NOTE — PROGRESS NOTES
Daily Note     Today's date: 2018  Patient name: Jordan Nazario  : 2000  MRN: [id number]  Referring provider: FABY Ann  Dx:   Encounter Diagnosis     ICD-10-CM    1  Acute right-sided low back pain without sciatica M54 5        Start Time: 1100  Stop Time: 1200  Total time in clinic (min): 60 minutes    Subjective: Patient reports that following his previous treatment session his back felt great and was the loosest it's been  Objective: See treatment diary below      Assessment: Tolerated treatment well  Patient exhibited good technique with therapeutic exercises and would benefit from continued PT  Patient with improved neuromuscular control for gentle core stabilization exercises  Patient reporting that his low back feels great without restriction  Plan: Continue per plan of care  Progress treatment as tolerated  Precautions: asthma    Daily Treatment Diary     Manual            L4-5 unilateral PA mobs - Right  GR GR          S/l lumbar rotation mobs  GR           Right lumbar paraspinal STM  GR GR                                        Exercise Diary            VG with shoulder ext  5' L7 5' L7          Supine LTR  10x10" b/l 10x10" b/l          Seated PB multif  Rot  20x 5" GTB b/l 30x5" GTB b/l          BFKO  20x 30x          Quad  Alt  UE flex    20x 3x10                                                                                                                                                                                                                 Modalities

## 2018-04-23 ENCOUNTER — OFFICE VISIT (OUTPATIENT)
Dept: PHYSICAL THERAPY | Facility: REHABILITATION | Age: 18
End: 2018-04-23
Payer: COMMERCIAL

## 2018-04-23 DIAGNOSIS — M54.50 ACUTE RIGHT-SIDED LOW BACK PAIN WITHOUT SCIATICA: Primary | ICD-10-CM

## 2018-04-23 PROCEDURE — 97112 NEUROMUSCULAR REEDUCATION: CPT | Performed by: PHYSICAL THERAPIST

## 2018-04-23 PROCEDURE — 97140 MANUAL THERAPY 1/> REGIONS: CPT | Performed by: PHYSICAL THERAPIST

## 2018-04-23 PROCEDURE — 97110 THERAPEUTIC EXERCISES: CPT | Performed by: PHYSICAL THERAPIST

## 2018-04-23 NOTE — PROGRESS NOTES
Daily Note     Today's date: 2018  Patient name: Lovely Avery  : 2000  MRN: [id number]  Referring provider: FABY Moy  Dx:   Encounter Diagnosis     ICD-10-CM    1  Acute right-sided low back pain without sciatica M54 5        Start Time: 1455  Stop Time: 1605  Total time in clinic (min): 70 minutes    Subjective: Patient reports that his low back has been feeling significantly improved since beginning physical therapy  Patient states that he has improved ambulatory tolerance  Objective: See treatment diary below      Assessment: Tolerated treatment well  Patient demonstrated fatigue post treatment, exhibited good technique with therapeutic exercises and would benefit from continued PT      Plan: Continue per plan of care  Progress treatment as tolerated  Precautions: asthma    Daily Treatment Diary     Manual           L4-5 unilateral PA mobs - Right  GR GR GR         S/l lumbar rotation mobs  GR           Right lumbar paraspinal STM  GR GR GR                                       Exercise Diary           VG with shoulder ext  5' L7 5' L7 5' L7 BTB         Supine LTR  10x10" b/l 10x10" b/l 10x10"         Seated PB multif  Rot  20x 5" GTB b/l 30x5" GTB b/l          BFKO  20x 30x          Quad  Alt  UE flex  20x 3x10 3x10         Seated multifidi push-out on PB    20x5" BTB         Seated shoulder ext  On PB with alt  Hip flex      3x10 b/l GTB         Bridging    2x10 5"                                                                                                                                                                         Modalities

## 2018-04-27 ENCOUNTER — OFFICE VISIT (OUTPATIENT)
Dept: PHYSICAL THERAPY | Facility: REHABILITATION | Age: 18
End: 2018-04-27
Payer: COMMERCIAL

## 2018-04-27 DIAGNOSIS — M54.50 ACUTE RIGHT-SIDED LOW BACK PAIN WITHOUT SCIATICA: Primary | ICD-10-CM

## 2018-04-27 PROCEDURE — 97140 MANUAL THERAPY 1/> REGIONS: CPT | Performed by: PHYSICAL THERAPIST

## 2018-04-27 PROCEDURE — G8983 BODY POS D/C STATUS: HCPCS | Performed by: PHYSICAL THERAPIST

## 2018-04-27 PROCEDURE — G8982 BODY POS GOAL STATUS: HCPCS | Performed by: PHYSICAL THERAPIST

## 2018-04-27 NOTE — PROGRESS NOTES
Daily Note     Today's date: 2018  Patient name: Lovely Avery  : 2000  MRN: [id number]  Referring provider: FABY Moy  Dx:   Encounter Diagnosis     ICD-10-CM    1  Acute right-sided low back pain without sciatica M54 5        Start Time: 1100  Stop Time: 1135  Total time in clinic (min): 35 minutes    Subjective: Patient reports that his low back overall continues to feel less stiff and less painful  Patient has improved ambulatory tolerance  Objective: See treatment diary below      Assessment: Tolerated treatment well  Patient exhibited good technique with therapeutic exercises  Patient overall progressing well per POC, reporting decreased low back stiffness and improved ambulatory tolerance  Consider discharge next visit  Plan: Continue per plan of care  Potential discharge next visit  Precautions: asthma    Daily Treatment Diary     Manual          L4-5 unilateral PA mobs - Right  GR GR GR         S/l lumbar rotation mobs  GR           Right lumbar paraspinal STM  GR GR GR GR        Pelvic rocking     GR        Sacral rocking     GR            Exercise Diary          VG with shoulder ext  5' L7 5' L7 5' L7 BTB         Supine LTR  10x10" b/l 10x10" b/l 10x10"         Seated PB multif  Rot  20x 5" GTB b/l 30x5" GTB b/l  30x5" BTB b/l        BFKO  20x 30x          Quad  Alt  UE flex  20x 3x10 3x10         Seated multifidi push-out on PB    20x5" BTB         Seated shoulder ext  On PB with alt  Hip flex      3x10 b/l GTB         Bridging    2x10 5"                                                                                                                                                                         Modalities

## 2018-05-23 ENCOUNTER — APPOINTMENT (OUTPATIENT)
Dept: LAB | Facility: CLINIC | Age: 18
End: 2018-05-23
Payer: COMMERCIAL

## 2018-05-23 DIAGNOSIS — K76.0 NONALCOHOLIC FATTY LIVER DISEASE: ICD-10-CM

## 2018-05-23 DIAGNOSIS — E66.01 MORBID OBESITY (HCC): ICD-10-CM

## 2018-05-23 LAB
ALBUMIN SERPL BCP-MCNC: 4.4 G/DL (ref 3.5–5)
ALP SERPL-CCNC: 91 U/L (ref 46–116)
ALT SERPL W P-5'-P-CCNC: 36 U/L (ref 12–78)
ANION GAP SERPL CALCULATED.3IONS-SCNC: 7 MMOL/L (ref 4–13)
AST SERPL W P-5'-P-CCNC: 30 U/L (ref 5–45)
BILIRUB SERPL-MCNC: 0.74 MG/DL (ref 0.2–1)
BUN SERPL-MCNC: 9 MG/DL (ref 5–25)
CALCIUM SERPL-MCNC: 9.7 MG/DL (ref 8.3–10.1)
CHLORIDE SERPL-SCNC: 105 MMOL/L (ref 100–108)
CHOLEST SERPL-MCNC: 153 MG/DL (ref 50–200)
CO2 SERPL-SCNC: 26 MMOL/L (ref 21–32)
CREAT SERPL-MCNC: 0.88 MG/DL (ref 0.6–1.3)
EST. AVERAGE GLUCOSE BLD GHB EST-MCNC: 111 MG/DL
GFR SERPL CREATININE-BSD FRML MDRD: 143 ML/MIN/1.73SQ M
GLUCOSE P FAST SERPL-MCNC: 85 MG/DL (ref 65–99)
HBA1C MFR BLD: 5.5 % (ref 4.2–6.3)
HDLC SERPL-MCNC: 45 MG/DL (ref 40–60)
INSULIN SERPL-ACNC: 31.8 MU/L (ref 3–25)
LDLC SERPL CALC-MCNC: 87 MG/DL (ref 0–100)
NONHDLC SERPL-MCNC: 108 MG/DL
POTASSIUM SERPL-SCNC: 3.6 MMOL/L (ref 3.5–5.3)
PROT SERPL-MCNC: 8.4 G/DL (ref 6.4–8.2)
SODIUM SERPL-SCNC: 138 MMOL/L (ref 136–145)
TRIGL SERPL-MCNC: 104 MG/DL

## 2018-05-23 PROCEDURE — 83036 HEMOGLOBIN GLYCOSYLATED A1C: CPT

## 2018-05-23 PROCEDURE — 83525 ASSAY OF INSULIN: CPT

## 2018-05-23 PROCEDURE — 36415 COLL VENOUS BLD VENIPUNCTURE: CPT

## 2018-05-23 PROCEDURE — 80061 LIPID PANEL: CPT

## 2018-05-23 PROCEDURE — 80053 COMPREHEN METABOLIC PANEL: CPT

## 2018-06-26 RX ORDER — SODIUM CHLORIDE 0.65 %
AEROSOL, SPRAY (ML) NASAL
OUTPATIENT
Start: 2018-06-26

## 2018-06-26 RX ORDER — TRIAMCINOLONE ACETONIDE 1 MG/G
CREAM TOPICAL
Qty: 80 G | OUTPATIENT
Start: 2018-06-26

## 2018-06-26 RX ORDER — CLONIDINE HYDROCHLORIDE 0.3 MG/1
TABLET ORAL
Qty: 30 TABLET | OUTPATIENT
Start: 2018-06-26

## 2018-06-26 RX ORDER — PIMECROLIMUS 1 %
CREAM (GRAM) TOPICAL
Qty: 100 G | OUTPATIENT
Start: 2018-06-26

## 2018-06-26 NOTE — TELEPHONE ENCOUNTER
I am not what this patient needs this meds for  Some of them I never prescribed for him  Let him make an apt and come and see me  Thanks   Vaishali Aponte

## 2018-06-27 DIAGNOSIS — E55.9 VITAMIN D DEFICIENCY: Primary | ICD-10-CM

## 2018-06-27 DIAGNOSIS — J45.20 MILD INTERMITTENT ASTHMA WITHOUT COMPLICATION: ICD-10-CM

## 2018-06-27 DIAGNOSIS — J45.909 UNCOMPLICATED ASTHMA, UNSPECIFIED ASTHMA SEVERITY, UNSPECIFIED WHETHER PERSISTENT: ICD-10-CM

## 2018-06-28 RX ORDER — DEXAMETHASONE 4 MG/1
TABLET ORAL
Qty: 1 INHALER | Refills: 5 | Status: SHIPPED | OUTPATIENT
Start: 2018-06-28

## 2018-06-28 RX ORDER — ACETAMINOPHEN 160 MG
TABLET,DISINTEGRATING ORAL
Qty: 90 CAPSULE | Refills: 2 | Status: SHIPPED | OUTPATIENT
Start: 2018-06-28

## 2018-07-02 ENCOUNTER — OFFICE VISIT (OUTPATIENT)
Dept: INTERNAL MEDICINE CLINIC | Facility: CLINIC | Age: 18
End: 2018-07-02
Payer: COMMERCIAL

## 2018-07-02 VITALS
BODY MASS INDEX: 42.66 KG/M2 | HEIGHT: 72 IN | WEIGHT: 315 LBS | HEART RATE: 87 BPM | TEMPERATURE: 98.5 F | DIASTOLIC BLOOD PRESSURE: 90 MMHG | SYSTOLIC BLOOD PRESSURE: 138 MMHG

## 2018-07-02 DIAGNOSIS — E66.01 MORBID OBESITY WITH BMI OF 45.0-49.9, ADULT (HCC): ICD-10-CM

## 2018-07-02 DIAGNOSIS — G47.09 OTHER INSOMNIA: ICD-10-CM

## 2018-07-02 DIAGNOSIS — J30.89 SEASONAL ALLERGIC RHINITIS DUE TO OTHER ALLERGIC TRIGGER: Primary | ICD-10-CM

## 2018-07-02 PROCEDURE — 99213 OFFICE O/P EST LOW 20 MIN: CPT | Performed by: NURSE PRACTITIONER

## 2018-07-02 PROCEDURE — 3008F BODY MASS INDEX DOCD: CPT | Performed by: NURSE PRACTITIONER

## 2018-07-02 RX ORDER — MONTELUKAST SODIUM 10 MG/1
10 TABLET ORAL DAILY
Qty: 90 TABLET | Refills: 1 | Status: SHIPPED | OUTPATIENT
Start: 2018-07-02

## 2018-07-02 RX ORDER — CLONIDINE HYDROCHLORIDE 0.3 MG/1
0.3 TABLET ORAL DAILY
Qty: 30 TABLET | Refills: 0 | Status: SHIPPED | OUTPATIENT
Start: 2018-07-02

## 2018-07-02 RX ORDER — FLUTICASONE PROPIONATE 50 MCG
1 SPRAY, SUSPENSION (ML) NASAL DAILY
Qty: 16 G | Refills: 3 | Status: SHIPPED | OUTPATIENT
Start: 2018-07-02

## 2018-07-02 RX ORDER — POLYVINYL ALCOHOL 14 MG/ML
1 SOLUTION/ DROPS OPHTHALMIC AS NEEDED
Qty: 15 ML | Refills: 3 | Status: SHIPPED | OUTPATIENT
Start: 2018-07-02

## 2018-07-02 RX ORDER — SODIUM CHLORIDE 0.65 %
1 AEROSOL, SPRAY (ML) NASAL 2 TIMES DAILY PRN
Qty: 30 ML | Refills: 3 | Status: SHIPPED | OUTPATIENT
Start: 2018-07-02

## 2018-07-02 RX ORDER — DEXTROAMPHETAMINE SACCHARATE, AMPHETAMINE ASPARTATE, DEXTROAMPHETAMINE SULFATE AND AMPHETAMINE SULFATE 2.5; 2.5; 2.5; 2.5 MG/1; MG/1; MG/1; MG/1
1 TABLET ORAL EVERY EVENING
Refills: 0 | COMMUNITY
Start: 2018-06-09

## 2018-07-02 NOTE — PATIENT INSTRUCTIONS
Allergic Rhinitis   WHAT YOU NEED TO KNOW:   Allergic rhinitis, or hay fever, is swelling of the inside of your nose  The swelling is a reaction to allergens in the air  An allergen can be anything that causes an allergic reaction  Allergies to weeds, grass, trees, or mold often cause seasonal allergic rhinitis  Indoor dust mites, cockroaches, pet dander, or mold can also cause allergic rhinitis  DISCHARGE INSTRUCTIONS:   Call 911 for the following:   · You have chest pain or shortness of breath  Return to the emergency department if:   · You have severe pain  · You cough up blood  Contact your healthcare provider if:   · You have a fever  · You have ear or sinus pain, or a headache  · Your symptoms get worse, even after treatment  · You have yellow, green, brown, or bloody mucus coming from your nose  · Your nose is bleeding or you have pain inside your nose  · You have trouble sleeping because of your symptoms  · You have questions or concerns about your condition or care  Medicines:   · Medicines  help decrease your symptoms and clear your stuffy nose  · Take your medicine as directed  Contact your healthcare provider if you think your medicine is not helping or if you have side effects  Tell him of her if you are allergic to any medicine  Keep a list of the medicines, vitamins, and herbs you take  Include the amounts, and when and why you take them  Bring the list or the pill bottles to follow-up visits  Carry your medicine list with you in case of an emergency  How to manage allergic rhinitis:  The best way to manage allergic rhinitis is to avoid allergens that can trigger your symptoms  Any of the following may help decrease your symptoms:  · Rinse your nose and sinuses  with a salt water solution or use a salt water nasal spray  This will help thin the mucus in your nose and rinse away pollen and dirt  It will also help reduce swelling so you can breathe normally  Ask your healthcare provider how often to rinse your nose  · Reduce exposure to dust mites  Wash sheets and towels in hot water every week  Cover your pillows and mattresses with allergen-free covers  Limit the number of stuffed animals and soft toys your child has  Wash your child's toys in hot water regularly  Vacuum weekly and use a vacuum  with an air filter  If possible, get rid of carpets and curtains  These collect dust and dust mites  · Reduce exposure to pollen  Keep windows and doors closed in your house and car  Stay inside when air pollution or the pollen count is high  Run your air conditioner on recycle, and change air filters often  Shower and wash your hair before bed every night to rinse away pollen  · Reduce exposure to pet dander  If possible, do not keep cats, dogs, birds, or other pets  If you do keep pets in your home, keep them out of bedrooms and carpeted rooms  Bathe them often  · Reduce exposure to mold  Do not spend time in basements  Choose artificial plants instead of live plants  Keep your home's humidity at less than 45%  Do not have ponds or standing water in your home or yard  · Do not smoke  Avoid others who smoke  Ask your healthcare provider for information if you currently smoke and need help to quit  Follow up with your healthcare provider as directed: You may need to see an allergist often to control your symptoms  Write down your questions so you remember to ask them during your visits  © 2017 [employer] Information is for End User's use only and may not be sold, redistributed or otherwise used for commercial purposes  All illustrations and images included in CareNotes® are the copyrighted property of [employer] , [employer]  or [employer]  The above information is an  only  It is not intended as medical advice for individual conditions or treatments   Talk to your doctor, nurse or pharmacist before following any medical regimen to see if it is safe and effective for you

## 2018-07-02 NOTE — PROGRESS NOTES
Assessment/Plan:     Diagnoses and all orders for this visit:    Seasonal allergic rhinitis due to other allergic trigger  -     DEEP SEA NASAL SPRAY 0 65 % nasal spray; 1 spray into each nostril 2 (two) times a day as needed for congestion  -     LIQUITEARS 1 4 % ophthalmic solution; Administer 1 drop to both eyes as needed for dry eyes  -     fluticasone (FLONASE) 50 mcg/act nasal spray; 1 spray into each nostril daily  -     Cetirizine HCl (ZYRTEC ALLERGY) 10 MG CAPS; Take 1 capsule (10 mg total) by mouth daily  -     montelukast (SINGULAIR) 10 mg tablet; Take 1 tablet (10 mg total) by mouth daily    Other insomnia  -     cloNIDine (CATAPRES) 0 3 mg tablet; Take 1 tablet (0 3 mg total) by mouth daily    Morbid obesity with BMI of 45 0-49 9, adult ([facility] )  -     Ambulatory referral to Weight Management; Future            Subjective:  Presents to the clinic for allergic rhinitis, chronic     Patient ID: Nadine Jiang is a 19 y o  male  HPI  He reports has chronic allergies and is requesting refill of meds  Has not taken some in several days  He has been to says his former PCP was giving him this meds as listed in med list and is being prescribed today  Symptoms include sneezing, itchy eyes and mild dry cough, but no CP, dizziness, Nausea, vomiting or abd pain  and SOB only with exertion  No wheezing  Also requesting referral to weight loss clinic  Morbidly obese, current BMI is 46 52 kg/m2  The following portions of the patient's history were reviewed and updated as appropriate: allergies, current medications, past family history, past medical history, past social history, past surgical history and problem list     Review of Systems   Constitutional: Negative for appetite change, chills, fatigue and unexpected weight change  HENT: Positive for sneezing  Negative for congestion, facial swelling, hearing loss, postnasal drip, sinus pressure, sore throat and trouble swallowing      Eyes: Positive for itching  Negative for pain, discharge, redness and visual disturbance  Respiratory: Negative for cough, chest tightness, shortness of breath and wheezing  Cardiovascular: Negative for chest pain and palpitations  Gastrointestinal: Negative for blood in stool, diarrhea, nausea and vomiting  Neurological: Negative for dizziness, seizures, speech difficulty and headaches  Psychiatric/Behavioral: Positive for sleep disturbance (taking clonidine from psych clinic for sleep  requesting refill  will give a one month supply and he will f/u with them  )  Negative for confusion and hallucinations  The patient is not nervous/anxious  Objective:      /90 (BP Location: Left arm, Patient Position: Sitting, Cuff Size: Standard)   Pulse 87   Temp 98 5 °F (36 9 °C)   Ht 6' (1 829 m)   Wt (!) 156 kg (343 lb 0 6 oz)   BMI 46 52 kg/m²        Physical Exam   Constitutional: He appears well-developed and well-nourished  No distress  HENT:   Head: Normocephalic and atraumatic  Right Ear: External ear normal    Left Ear: External ear normal    Turbinates 2+  No bleeding  Eyes: Conjunctivae and EOM are normal  Pupils are equal, round, and reactive to light  Right eye exhibits no discharge  Left eye exhibits no discharge  Cardiovascular: Normal rate, regular rhythm and normal heart sounds  No murmur heard  Pulmonary/Chest: Effort normal  No respiratory distress  He has no wheezes  Skin: He is not diaphoretic  Psychiatric: He has a normal mood and affect   His behavior is normal  Judgment and thought content normal

## 2018-07-19 DIAGNOSIS — J30.89 SEASONAL ALLERGIC RHINITIS DUE TO OTHER ALLERGIC TRIGGER: ICD-10-CM

## 2018-07-19 DIAGNOSIS — G47.09 OTHER INSOMNIA: ICD-10-CM

## 2018-07-19 RX ORDER — CLONIDINE HYDROCHLORIDE 0.3 MG/1
TABLET ORAL
Qty: 30 TABLET | OUTPATIENT
Start: 2018-07-19

## 2018-07-19 RX ORDER — SODIUM CHLORIDE 0.65 %
AEROSOL, SPRAY (ML) NASAL
OUTPATIENT
Start: 2018-07-19

## 2018-07-19 NOTE — TELEPHONE ENCOUNTER
Deep sea nose spray was prescribed 2 weeks ago, with 3 weeks  He should still have enough  The clonidine will not be refilled  I had explained to patient that he will continue to get that from the psychiatrist, not from me  Thanks   Alexa Do

## 2018-08-22 DIAGNOSIS — J45.909 UNCOMPLICATED ASTHMA, UNSPECIFIED ASTHMA SEVERITY, UNSPECIFIED WHETHER PERSISTENT: ICD-10-CM

## 2018-10-11 ENCOUNTER — OFFICE VISIT (OUTPATIENT)
Dept: INTERNAL MEDICINE CLINIC | Facility: CLINIC | Age: 18
End: 2018-10-11
Payer: COMMERCIAL

## 2018-10-11 VITALS
DIASTOLIC BLOOD PRESSURE: 72 MMHG | BODY MASS INDEX: 41.75 KG/M2 | HEIGHT: 73 IN | WEIGHT: 315 LBS | HEART RATE: 92 BPM | SYSTOLIC BLOOD PRESSURE: 110 MMHG | TEMPERATURE: 98 F

## 2018-10-11 DIAGNOSIS — B30.9 ACUTE VIRAL CONJUNCTIVITIS OF RIGHT EYE: Primary | ICD-10-CM

## 2018-10-11 PROCEDURE — 99213 OFFICE O/P EST LOW 20 MIN: CPT | Performed by: INTERNAL MEDICINE

## 2018-10-11 RX ORDER — KETOTIFEN FUMARATE 0.35 MG/ML
1 SOLUTION/ DROPS OPHTHALMIC 2 TIMES DAILY
Qty: 5 ML | Refills: 0 | Status: SHIPPED | OUTPATIENT
Start: 2018-10-11

## 2018-10-11 RX ORDER — CETIRIZINE HYDROCHLORIDE 10 MG/1
TABLET ORAL
COMMUNITY
Start: 2018-07-19

## 2018-10-11 NOTE — PROGRESS NOTES
ASSESSMENT/PLAN:    Acute viral conjunctivitis of right eye  -likely to resolve by itself; symptomatic treatment  -prescribed Zaditor ophthalmic solution  -warm compresses  -hand hygiene  -provided work note  -advised to go to emergency department if visual disturbances developed, eye pain, purulent discharge, fever, nausea, vomiting,    Schedule a follow-up appointment in as needed    CHIEF COMPLAINT:   Redness and discharge of right eye    HISTORY OF PRESENT ILLNESS:    Patient is a 18-year-old male who presents today for evaluation of discharge and irritation of right eye for the past 3 days  He reports clear watery discharge with crusting in the morning  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  He states that he does rub his eyes excessively  He does not wear contacts  He has tried his mother's  tobramycin eyedrops twice daily for the past 3 days without much relief  Patient denies decreased vision, photophobia, nausea, vomiting, headaches, floaters, bleeding, purulent discharge from the eye  His main complaint is itchiness and burning of the right eye  The following portions of the patient's history were reviewed and updated as appropriate: allergies, current medications, past family history, past medical history, past social history, past surgical history and problem list     Review of Systems   Constitutional: Negative for chills, fatigue and fever  HENT: Negative for congestion, rhinorrhea, sinus pain and sinus pressure  Eyes: Positive for discharge, redness and itching  Negative for photophobia, pain and visual disturbance  Respiratory: Negative for cough and shortness of breath  Gastrointestinal: Negative for nausea and vomiting  Neurological: Negative for dizziness, light-headedness and headaches         OBJECTIVE:  Vitals:    10/11/18 1626   BP: 110/72   BP Location: Right arm   Patient Position: Sitting   Cuff Size: Large   Pulse: 92   Temp: 98 °F (36 7 °C)   TempSrc: Oral   Weight: (!) 157 kg (347 lb 0 1 oz)   Height: 6' 1" (1 854 m)     Physical Exam   Constitutional: He is oriented to person, place, and time  He appears well-developed and well-nourished  No distress  HENT:   Head: Normocephalic and atraumatic  Right Ear: External ear normal    Left Ear: External ear normal    Nose: Nose normal    Mouth/Throat: Oropharynx is clear and moist  No oropharyngeal exudate  Eyes: Pupils are equal, round, and reactive to light  EOM are normal  Right eye exhibits discharge  Left eye exhibits no discharge  No scleral icterus  Conjunctival injection the right eye noted   Neck: No tracheal deviation present  Cardiovascular: Normal rate, regular rhythm, normal heart sounds and intact distal pulses  Exam reveals no gallop and no friction rub  No murmur heard  Pulmonary/Chest: Effort normal and breath sounds normal  No stridor  No respiratory distress  He has no wheezes  He has no rales  He exhibits no tenderness  Lymphadenopathy:     He has no cervical adenopathy  Neurological: He is alert and oriented to person, place, and time  Skin: Skin is warm and dry  No rash noted  He is not diaphoretic  No erythema  No pallor  Vitals reviewed  Current Outpatient Prescriptions:     amphetamine-dextroamphetamine (ADDERALL) 10 mg tablet, Take 1 tablet by mouth every evening, Disp: , Rfl: 0    aspirin-acetaminophen-caffeine (EXCEDRIN MIGRAINE) 250-250-65 MG per tablet, Take by mouth, Disp: , Rfl:     cetirizine (ZyrTEC) 10 mg tablet, , Disp: , Rfl:     Cholecalciferol (VITAMIN D-3 PO), Take 5,000 Units by mouth daily  , Disp: , Rfl:     cloNIDine (CATAPRES) 0 3 mg tablet, Take 1 tablet (0 3 mg total) by mouth daily, Disp: 30 tablet, Rfl: 0    cyclobenzaprine (FLEXERIL) 5 mg tablet, Take 2 tablets (10 mg total) by mouth 3 (three) times a day as needed for muscle spasms (can cause drowsiness), Disp: 30 tablet, Rfl: 0    DEEP SEA NASAL SPRAY 0 65 % nasal spray, 1 spray into each nostril 2 (two) times a day as needed for congestion, Disp: 30 mL, Rfl: 3    dicyclomine (BENTYL) 20 mg tablet, Take 1 tablet (20 mg total) by mouth 3 (three) times a day as needed (abdominal pain and diarrhea), Disp: 90 tablet, Rfl: 3    FLOVENT  MCG/ACT inhaler, INHALE 2 PUFFS BY MOUTH TWICE DAILY, Disp: 1 Inhaler, Rfl: 5    fluticasone (FLONASE) 50 mcg/act nasal spray, 1 spray into each nostril daily, Disp: 16 g, Rfl: 3    LIQUITEARS 1 4 % ophthalmic solution, Administer 1 drop to both eyes as needed for dry eyes, Disp: 15 mL, Rfl: 3    lisdexamfetamine (VYVANSE) 30 MG capsule, Take 30 mg by mouth every morning , Disp: , Rfl:     metFORMIN (GLUCOPHAGE) 500 mg tablet, Take 500 mg by mouth 2 (two) times a day with meals  , Disp: , Rfl:     montelukast (SINGULAIR) 10 mg tablet, Take 1 tablet (10 mg total) by mouth daily, Disp: 90 tablet, Rfl: 1    naproxen (NAPROSYN) 500 mg tablet, Take 0 5 tablets (250 mg total) by mouth 2 (two) times a day with meals, Disp: 360 tablet, Rfl: 0    ranitidine (ZANTAC) 300 MG capsule, Take 1 capsule (300 mg total) by mouth 2 (two) times a day, Disp: 60 capsule, Rfl: 3    VENTOLIN  (90 Base) MCG/ACT inhaler, Inhale 2 puffs every 4 (four) hours as needed for wheezing, Disp: 1 Inhaler, Rfl: 0    Amphetamine-Dextroamphetamine (ADDERALL PO), Take 25 mg by mouth daily  , Disp: , Rfl:     Cetirizine HCl (ZYRTEC ALLERGY) 10 MG CAPS, Take 1 capsule (10 mg total) by mouth daily (Patient not taking: Reported on 10/11/2018 ), Disp: 90 capsule, Rfl: 1    Cholecalciferol (VITAMIN D3) 2000 units capsule, TAKE 1 CAPSULE BY MOUTH ONCE DAILY (Patient not taking: Reported on 10/11/2018), Disp: 90 capsule, Rfl: 2    fluticasone (FLOVENT HFA) 110 MCG/ACT inhaler, Inhale 2 puffs 2 (two) times a day, Disp: , Rfl:     ketotifen (ZADITOR) 0 025 % ophthalmic solution, Administer 1 drop to the right eye 2 (two) times a day, Disp: 5 mL, Rfl: 0    VENTOLIN  (90 Base) MCG/ACT inhaler, INHALE 2 PUFFS BY MOUTH EVERY 4 HOURS AS NEEDED (Patient not taking: Reported on 10/11/2018), Disp: 18 each, Rfl: 0    Past Medical History:   Diagnosis Date    ADHD (attention deficit hyperactivity disorder)     Allergy     Asthma     Hypertension     IBS (irritable bowel syndrome)     Insulin resistance     Migraine     Morbid obesity (HCC)     Seasonal allergies     Traumatic rupture of radial collateral ligament     resolved 33/28/6284    Umbilical hernia      Past Surgical History:   Procedure Laterality Date    ADENOIDECTOMY      CIRCUMCISION      ESOPHAGOGASTRODUODENOSCOPY      with biopsy    AR EGD TRANSORAL BIOPSY SINGLE/MULTIPLE N/A 6/14/2016    Procedure: ESOPHAGOGASTRODUODENOSCOPY (EGD); Surgeon: Haleigh Aguillon MD;  Location: [facility]; Service: Pediatric Gastrointestinal    TONSILLECTOMY AND ADENOIDECTOMY      UMBILICAL HERNIA REPAIR       Social History     Social History    Marital status: Single     Spouse name: N/A    Number of children: N/A    Years of education: N/A     Occupational History    Not on file       Social History Main Topics    Smoking status: Never Smoker    Smokeless tobacco: Never Used    Alcohol use No    Drug use: No    Sexual activity: Not on file     Other Topics Concern    Not on file     Social History Narrative    Caffeine use    Currently in school    Lives at home with mother and sister , safe at home , no smoking , 2 cats one dog and in 11th grade      Family History   Problem Relation Age of Onset    Diabetes Mother     Asthma Mother     Hypertension Mother    Joie Leisure Glaucoma Mother    Joie Leisure Migraines Mother     Other Mother         gastric surg for morbid obesity     No Known Problems Father     Hypertension Sister     Allergies Sister         seasonal    Cancer Family         stomach, uteran, breast    Cataracts Family     Cervical cancer Family         primary  Blindness Maternal Grandfather         due to type 2     Diabetes type II Maternal Grandfather     Diabetes type II Maternal Aunt        ==  DO Frankie Lawson 73 Internal Medicine PGY-2    [address] [facility] , [address] [address] [facility] [address] [facility]  Office: [phone]  Fax: [phone]

## 2018-10-11 NOTE — LETTER
October 11, 2018     Patient: Vickey Jefferson   YOB: 2000   Date of Visit: 10/11/2018       To Whom it May Concern:    Jonas Frankel is under my professional care  He was seen in my office on 10/11/2018  He may return to work on 10/15/2018  If you have any questions or concerns, please don't hesitate to call           Sincerely,          Avila Hernandez DO        CC: No Recipients

## 2018-10-18 ENCOUNTER — TELEPHONE (OUTPATIENT)
Dept: INTERNAL MEDICINE CLINIC | Facility: CLINIC | Age: 18
End: 2018-10-18

## 2018-10-18 NOTE — TELEPHONE ENCOUNTER
1) why is this patient going to cardiology? 2) please find out the name of the allergy specialist he wants to go to  Is he going to a [facility]'s location? João Vanessa The system does not allow me to do an referral for allergy specialist without the name of a physician  Thanks   Yahaira Bennett

## 2018-10-18 NOTE — TELEPHONE ENCOUNTER
PATIENT MOTHER CALLED REQUESTING REFERRALS TO CARDIOLOGY AND FOR THE ALLERGIST    PLEASE ADD ORDER    FAX REF TO ALLERGIST [phone]

## 2018-10-19 ENCOUNTER — HOSPITAL ENCOUNTER (EMERGENCY)
Facility: HOSPITAL | Age: 18
Discharge: HOME | End: 2018-10-20
Attending: EMERGENCY MEDICINE
Payer: COMMERCIAL

## 2018-10-19 VITALS
TEMPERATURE: 97.8 F | RESPIRATION RATE: 18 BRPM | HEART RATE: 98 BPM | OXYGEN SATURATION: 98 % | DIASTOLIC BLOOD PRESSURE: 53 MMHG | BODY MASS INDEX: 41.88 KG/M2 | SYSTOLIC BLOOD PRESSURE: 102 MMHG | WEIGHT: 315 LBS

## 2018-10-19 DIAGNOSIS — J06.9 VIRAL URI: ICD-10-CM

## 2018-10-19 DIAGNOSIS — I10 ESSENTIAL HYPERTENSION: Primary | ICD-10-CM

## 2018-10-19 DIAGNOSIS — J45.20 MILD INTERMITTENT ASTHMA WITHOUT COMPLICATION: ICD-10-CM

## 2018-10-19 DIAGNOSIS — R68.89 FLU-LIKE SYMPTOMS: Primary | ICD-10-CM

## 2018-10-19 PROBLEM — B30.9 ACUTE VIRAL CONJUNCTIVITIS OF RIGHT EYE: Status: RESOLVED | Noted: 2018-10-11 | Resolved: 2018-10-19

## 2018-10-19 PROCEDURE — 99283 EMERGENCY DEPT VISIT LOW MDM: CPT

## 2018-10-19 NOTE — TELEPHONE ENCOUNTER
Patient is going to see cardiology for high blood pressure, has an appt scheduled in [address] on 10/24/18    The allergists that he is seeing is Terra George, [facility]    Please add order, thank you

## 2018-10-19 NOTE — TELEPHONE ENCOUNTER
LMOM TO PATIENTS MOTHER MAKING HER AWARE THE ORDERS ARE IN AND THAT I FAXED THE ORDER OVER TO THE ALLERGISTS OFFICE, CONFIRMATION RECIEVED

## 2018-10-20 RX ORDER — PSEUDOEPHEDRINE HYDROCHLORIDE 30 MG/1
30 TABLET ORAL EVERY 6 HOURS PRN
Qty: 30 TABLET | Refills: 0 | Status: SHIPPED | OUTPATIENT
Start: 2018-10-20 | End: 2019-06-03 | Stop reason: SDUPTHER

## 2018-10-20 NOTE — DISCHARGE INSTRUCTIONS
Please follow-up with your PCP in 3-5 days  If your symptoms worsen please return the emergency department immediately  Upper Respiratory Infection   WHAT YOU NEED TO KNOW:   An upper respiratory infection is also called the common cold  It is an infection that can affect your nose, throat, ears, and sinuses  For healthy people, the common cold is usually not serious and does not need special treatment  Cold symptoms are usually worst for the first 3 to 5 days  Most people get better in 7 to 14 days  You may continue to cough for 2 to 3 weeks  Colds are caused by viruses and do not get better with antibiotics  DISCHARGE INSTRUCTIONS:   Return to the emergency department if:   · You have chest pain or trouble breathing  Contact your healthcare provider if:   · You have a fever over 102ºF (39°C)  · Your sore throat gets worse or you see white or yellow spots in your throat  · Your symptoms get worse after 3 to 5 days or your cold is not better in 14 days  · You have a rash anywhere on your skin  · You have large, tender lumps in your neck  · You have thick, green or yellow drainage from your nose  · You cough up thick yellow, green, or bloody mucus  · You have vomiting for more than 24 hours and cannot keep fluids down  · You have a bad earache  · You have questions or concerns about your condition or care  Medicines: You may need any of the following:  · Decongestants  help reduce nasal congestion and help you breathe more easily  If you take decongestant pills, they may make you feel restless or cause problems with your sleep  Do not use decongestant sprays for more than a few days  · Cough suppressants  help reduce coughing  Ask your healthcare provider which type of cough medicine is best for you  · NSAIDs , such as ibuprofen, help decrease swelling, pain, and fever  NSAIDs can cause stomach bleeding or kidney problems in certain people   If you take blood thinner medicine, always ask your healthcare provider if NSAIDs are safe for you  Always read the medicine label and follow directions  · Acetaminophen  decreases pain and fever  It is available without a doctor's order  Ask how much to take and how often to take it  Follow directions  Read the labels of all other medicines you are using to see if they also contain acetaminophen, or ask your doctor or pharmacist  Acetaminophen can cause liver damage if not taken correctly  Do not use more than 4 grams (4,000 milligrams) total of acetaminophen in one day  · Take your medicine as directed  Contact your healthcare provider if you think your medicine is not helping or if you have side effects  Tell him or her if you are allergic to any medicine  Keep a list of the medicines, vitamins, and herbs you take  Include the amounts, and when and why you take them  Bring the list or the pill bottles to follow-up visits  Carry your medicine list with you in case of an emergency  Follow up with your healthcare provider as directed:  Write down your questions so you remember to ask them during your visits  Self-care:   · Rest as much as possible  Slowly start to do more each day  · Drink more liquids as directed  Liquids will help thin and loosen mucus so you can cough it up  Liquids will also help prevent dehydration  Liquids that help prevent dehydration include water, fruit juice, and broth  Do not drink liquids that contain caffeine  Caffeine can increase your risk for dehydration  Ask your healthcare provider how much liquid to drink each day  · Soothe a sore throat  Gargle with warm salt water  This helps your sore throat feel better  Make salt water by dissolving ¼ teaspoon salt in 1 cup warm water  You may also suck on hard candy or throat lozenges  You may use a sore throat spray  · Use a humidifier or vaporizer  Use a cool mist humidifier or a vaporizer to increase air moisture in your home   This may make it easier for you to breathe and help decrease your cough  · Use saline nasal drops as directed  These help relieve congestion  · Apply petroleum-based jelly around the outside of your nostrils  This can decrease irritation from blowing your nose  · Do not smoke  Nicotine and other chemicals in cigarettes and cigars can make your symptoms worse  They can also cause infections such as bronchitis or pneumonia  Ask your healthcare provider for information if you currently smoke and need help to quit  E-cigarettes or smokeless tobacco still contain nicotine  Talk to your healthcare provider before you use these products  Prevent spreading your cold to others:   · Try to stay away from other people during the first 2 to 3 days of your cold when it is more easily spread  · Do not share food or drinks  · Do not share hand towels with household members  · Wash your hands often, especially after you blow your nose  Turn away from other people and cover your mouth and nose with a tissue when you sneeze or cough  © 2017 [facility] Information is for End User's use only and may not be sold, redistributed or otherwise used for commercial purposes  All illustrations and images included in CareNotes® are the copyrighted property of [employer] , [employer]  or Ezekiel Norton  The above information is an  only  It is not intended as medical advice for individual conditions or treatments  Talk to your doctor, nurse or pharmacist before following any medical regimen to see if it is safe and effective for you

## 2018-10-20 NOTE — ED ATTENDING ATTESTATION
Amanda Ortiz MD, saw and evaluated the patient  I have discussed the patient with the resident/non-physician practitioner and agree with the resident's/non-physician practitioner's findings, Plan of Care, and MDM as documented in the resident's/non-physician practitioner's note, except where noted  All available labs and Radiology studies were reviewed  At this point I agree with the current assessment done in the Emergency Department  I have conducted an independent evaluation of this patient including a focused history of:    Emergency Department Note- Christal Trujillo 19 y o  male MRN: [id number]    Unit/Bed#Elsa Hendrix Encounter: [id number]    Romero Prieto is a 19 y o  male who presents with   Chief Complaint   Patient presents with    Flu Symptoms     Pt states he has had a cold 2 weeks  Pt denies NVD  Pt states he has "yellow discharge" from nose  History of Present Illness   HPI:  Romero Prieto is a 19 y o  male who presents for evaluation of:  Cough, nasal congestion for several days  Patient denies fevers, chills, SOB    Review of Systems   Constitutional: Positive for fatigue  Negative for fever  HENT: Positive for congestion and rhinorrhea  All other systems reviewed and are negative  Historical Information   Past Medical History:   Diagnosis Date    ADHD (attention deficit hyperactivity disorder)     Allergy     Asthma     Hypertension     IBS (irritable bowel syndrome)     Insulin resistance     Migraine     Morbid obesity (HCC)     Seasonal allergies     Traumatic rupture of radial collateral ligament     resolved 30/45/0851    Umbilical hernia      Past Surgical History:   Procedure Laterality Date    ADENOIDECTOMY      CIRCUMCISION      ESOPHAGOGASTRODUODENOSCOPY      with biopsy    CA EGD TRANSORAL BIOPSY SINGLE/MULTIPLE N/A 6/14/2016    Procedure: ESOPHAGOGASTRODUODENOSCOPY (EGD); Surgeon: Katharina Chau MD;  Location: [facility];   Service: Pediatric Gastrointestinal    TONSILLECTOMY AND ADENOIDECTOMY      UMBILICAL HERNIA REPAIR       Social History   History   Alcohol Use No     History   Drug Use No     History   Smoking Status    Never Smoker   Smokeless Tobacco    Never Used     Family History: non-contributory    Meds/Allergies   all medications and allergies reviewed  Allergies   Allergen Reactions    Dog Epithelium     Other Other (See Comments)     Congestion, itchy eyes    Penicillin V      Reaction Date: 2011;     Pollen Extract Other (See Comments)     Congestion, itchy eyes    Penicillins Hives, Swelling and Rash       Objective   First Vitals:   Blood Pressure: 102/53 (10/19/18 2343)  Pulse: 98 (10/19/18 2343)  Temperature: 97 8 °F (36 6 °C) (10/19/18 2343)  Respirations: 18 (10/19/18 2343)  Weight - Scale: (!) 152 kg (335 lb 1 6 oz) (10/19/18 2343)  SpO2: 98 % (10/19/18 2343)    Current Vitals:   Blood Pressure: 102/53 (10/19/18 2343)  Pulse: 98 (10/19/18 2343)  Temperature: 97 8 °F (36 6 °C) (10/19/18 2343)  Respirations: 18 (10/19/18 2343)  Weight - Scale: (!) 152 kg (335 lb 1 6 oz) (10/19/18 2343)  SpO2: 98 % (10/19/18 2343)    No intake or output data in the 24 hours ending 10/20/18 0034    Invasive Devices          No matching active lines, drains, or airways          Physical Exam   Constitutional: He is oriented to person, place, and time  He appears well-developed and well-nourished  HENT:   Head: Normocephalic and atraumatic  Cardiovascular: Normal rate and regular rhythm  Pulmonary/Chest: Effort normal and breath sounds normal    Musculoskeletal: Normal range of motion  He exhibits no deformity  Neurological: He is alert and oriented to person, place, and time  Skin: Skin is warm and dry  Psychiatric: He has a normal mood and affect  His behavior is normal  Judgment and thought content normal    Vitals reviewed  Medical Decision Makin   Acute viral URI    No results found for this or any previous visit (from the past 36 hour(s))  No orders to display         Portions of the record may have been created with voice recognition software  Occasional wrong word or "sound a like" substitutions may have occurred due to the inherent limitations of voice recognition software  Read the chart carefully and recognize, using context, where substitutions have occurred

## 2018-10-21 NOTE — ED PROVIDER NOTES
History  Chief Complaint   Patient presents with    Flu Symptoms     Pt states he has had a cold 2 weeks  Pt denies NVD  Pt states he has "yellow discharge" from nose  23-year-old male presents to the emergency department for evaluation of cold symptoms that she has had for 2 weeks  Patient states that he has been coughing yellow phlegm for 2 weeks  States that he has family members at home who have similar symptoms  He states he had his flu shot 5 days ago  He has also complains of postnasal drip and rhinorrhea  He states he took over-the-counter cough drops with moderate relief of his symptoms  Patient otherwise denies any recent antibiotic use or recent foreign travel  He denies being febrile, chills, chest pain, shortness of breath, nausea vomiting abdominal pain dysuria constipation or diarrhea  Prior to Admission Medications   Prescriptions Last Dose Informant Patient Reported? Taking? Amphetamine-Dextroamphetamine (ADDERALL PO) 10/19/2018 at Unknown time  Yes Yes   Sig: Take 25 mg by mouth daily  Cetirizine HCl (ZYRTEC ALLERGY) 10 MG CAPS 10/19/2018 at Unknown time  No Yes   Sig: Take 1 capsule (10 mg total) by mouth daily   Cholecalciferol (VITAMIN D-3 PO) 10/19/2018 at Unknown time  Yes Yes   Sig: Take 5,000 Units by mouth daily     Cholecalciferol (VITAMIN D3) 2000 units capsule 10/19/2018 at Unknown time  No Yes   Sig: TAKE 1 CAPSULE BY MOUTH ONCE DAILY   DEEP SEA NASAL SPRAY 0 65 % nasal spray 10/19/2018 at Unknown time  No Yes   Si spray into each nostril 2 (two) times a day as needed for congestion   FLOVENT  MCG/ACT inhaler 10/19/2018 at Unknown time  No Yes   Sig: INHALE 2 PUFFS BY MOUTH TWICE DAILY   LIQUITEARS 1 4 % ophthalmic solution 10/19/2018 at Unknown time  No Yes   Sig: Administer 1 drop to both eyes as needed for dry eyes   VENTOLIN  (90 Base) MCG/ACT inhaler 10/19/2018 at Unknown time  No Yes   Sig: Inhale 2 puffs every 4 (four) hours as needed for wheezing   VENTOLIN  (90 Base) MCG/ACT inhaler 10/19/2018 at Unknown time  No Yes   Sig: INHALE 2 PUFFS BY MOUTH EVERY 4 HOURS AS NEEDED   amphetamine-dextroamphetamine (ADDERALL) 10 mg tablet 10/19/2018 at Unknown time  Yes Yes   Sig: Take 1 tablet by mouth every evening   aspirin-acetaminophen-caffeine (EXCEDRIN MIGRAINE) 250-250-65 MG per tablet 10/19/2018 at Unknown time  Yes Yes   Sig: Take by mouth   cetirizine (ZyrTEC) 10 mg tablet 10/19/2018 at Unknown time  Yes Yes   cloNIDine (CATAPRES) 0 3 mg tablet 10/19/2018 at Unknown time  No Yes   Sig: Take 1 tablet (0 3 mg total) by mouth daily   cyclobenzaprine (FLEXERIL) 5 mg tablet 10/19/2018 at Unknown time  No Yes   Sig: Take 2 tablets (10 mg total) by mouth 3 (three) times a day as needed for muscle spasms (can cause drowsiness)   dicyclomine (BENTYL) 20 mg tablet 10/19/2018 at Unknown time  No Yes   Sig: Take 1 tablet (20 mg total) by mouth 3 (three) times a day as needed (abdominal pain and diarrhea)   fluticasone (FLONASE) 50 mcg/act nasal spray 10/19/2018 at Unknown time  No Yes   Si spray into each nostril daily   fluticasone (FLOVENT HFA) 110 MCG/ACT inhaler 10/19/2018 at Unknown time  Yes Yes   Sig: Inhale 2 puffs 2 (two) times a day   ketotifen (ZADITOR) 0 025 % ophthalmic solution 10/19/2018 at Unknown time  No Yes   Sig: Administer 1 drop to the right eye 2 (two) times a day   lisdexamfetamine (VYVANSE) 30 MG capsule 10/19/2018 at Unknown time  Yes Yes   Sig: Take 30 mg by mouth every morning  metFORMIN (GLUCOPHAGE) 500 mg tablet 10/19/2018 at Unknown time  Yes Yes   Sig: Take 500 mg by mouth 2 (two) times a day with meals     montelukast (SINGULAIR) 10 mg tablet 10/19/2018 at Unknown time  No Yes   Sig: Take 1 tablet (10 mg total) by mouth daily   naproxen (NAPROSYN) 500 mg tablet 10/19/2018 at Unknown time  No Yes   Sig: Take 0 5 tablets (250 mg total) by mouth 2 (two) times a day with meals   ranitidine (ZANTAC) 300 MG capsule 10/19/2018 at Unknown time  No Yes   Sig: Take 1 capsule (300 mg total) by mouth 2 (two) times a day      Facility-Administered Medications: None       Past Medical History:   Diagnosis Date    ADHD (attention deficit hyperactivity disorder)     Allergy     Asthma     Hypertension     IBS (irritable bowel syndrome)     Insulin resistance     Migraine     Morbid obesity (HCC)     Seasonal allergies     Traumatic rupture of radial collateral ligament     resolved 56/90/2743    Umbilical hernia        Past Surgical History:   Procedure Laterality Date    ADENOIDECTOMY      CIRCUMCISION      ESOPHAGOGASTRODUODENOSCOPY      with biopsy    MT EGD TRANSORAL BIOPSY SINGLE/MULTIPLE N/A 6/14/2016    Procedure: ESOPHAGOGASTRODUODENOSCOPY (EGD); Surgeon: Yoli Pathak MD;  Location: [facility]; Service: Pediatric Gastrointestinal    TONSILLECTOMY AND ADENOIDECTOMY      UMBILICAL HERNIA REPAIR         Family History   Problem Relation Age of Onset    Diabetes Mother     Asthma Mother     Hypertension Mother    Gilma Jeffers Glaucoma Mother    Gilma Jeffers Migraines Mother     Other Mother         gastric surg for morbid obesity     No Known Problems Father     Hypertension Sister     Allergies Sister         seasonal    Cancer Family         stomach, uteran, breast    Cataracts Family     Cervical cancer Family         primary     Blindness Maternal Grandfather         due to type 2     Diabetes type II Maternal Grandfather     Diabetes type II Maternal Aunt      I have reviewed and agree with the history as documented  Social History   Substance Use Topics    Smoking status: Never Smoker    Smokeless tobacco: Never Used    Alcohol use No        Review of Systems   Constitutional: Negative for appetite change, chills, diaphoresis, fatigue and fever  HENT: Positive for postnasal drip and rhinorrhea  Negative for congestion, ear discharge, ear pain, hearing loss, sneezing and sore throat      Eyes: Negative for pain, discharge and redness  Respiratory: Positive for cough  Negative for choking, chest tightness, shortness of breath, wheezing and stridor  Cardiovascular: Negative for chest pain and palpitations  Gastrointestinal: Negative for abdominal distention, abdominal pain, blood in stool, constipation, diarrhea, nausea and vomiting  Genitourinary: Negative for decreased urine volume, difficulty urinating, dysuria, flank pain, frequency and hematuria  Musculoskeletal: Negative for arthralgias, gait problem, joint swelling and neck pain  Skin: Negative for color change, pallor and rash  Allergic/Immunologic: Negative for environmental allergies, food allergies and immunocompromised state  Neurological: Negative for dizziness, seizures, weakness, light-headedness, numbness and headaches  Hematological: Negative for adenopathy  Does not bruise/bleed easily  Psychiatric/Behavioral: Negative for agitation and behavioral problems  Physical Exam  ED Triage Vitals [10/19/18 2343]   Temperature Pulse Respirations Blood Pressure SpO2   97 8 °F (36 6 °C) 98 18 102/53 98 %      Temp src Heart Rate Source Patient Position - Orthostatic VS BP Location FiO2 (%)   -- -- -- -- --      Pain Score       6           Orthostatic Vital Signs  Vitals:    10/19/18 2343   BP: 102/53   Pulse: 98       Physical Exam   Constitutional: He is oriented to person, place, and time  He appears well-developed and well-nourished  HENT:   Head: Normocephalic and atraumatic  Nose: Nose normal    Mouth/Throat: Oropharynx is clear and moist    Eyes: Pupils are equal, round, and reactive to light  Conjunctivae and EOM are normal    Neck: Normal range of motion  Neck supple  Cardiovascular: Normal rate, regular rhythm and normal heart sounds  Exam reveals no gallop and no friction rub  No murmur heard  Pulmonary/Chest: Effort normal and breath sounds normal  No respiratory distress  He has no wheezes  He has no rales  Abdominal: Soft  Bowel sounds are normal  He exhibits no distension  There is no tenderness  There is no rebound and no guarding  Musculoskeletal: Normal range of motion  Neurological: He is alert and oriented to person, place, and time  Skin: Skin is warm and dry  Psychiatric: He has a normal mood and affect  His behavior is normal    Nursing note and vitals reviewed  ED Medications  Medications - No data to display    Diagnostic Studies  Results Reviewed     None                 No orders to display         Procedures  Procedures      Phone Consults  ED Phone Contact    ED Course                               MDM  Number of Diagnoses or Management Options  Flu-like symptoms:   Viral URI:   Diagnosis management comments: 78-year-old male presents to the emergency department for evaluation of URI symptoms  MDM:  Eyes highly suspect patient has viral URI  I will recommend NSAID for symptomatic relief  I will discharge with PCP follow-up in appropriate return precautions  I gave oral return precautions for what to return for in addition to the written return precautions  The patient verbalized understanding of the discharge instructions and warnings that would necessitate return to the Emergency Department  I specifically highlighted areas of special concern regarding the written and verbal discharge instructions and return precautions  All questions were answered prior to discharge  CritCare Time    Disposition  Final diagnoses:   Flu-like symptoms   Viral URI     Time reflects when diagnosis was documented in both MDM as applicable and the Disposition within this note     Time User Action Codes Description Comment    10/20/2018 12:39 AM Cuauhtemoc Hargrove Add [R68 89] Flu-like symptoms     10/20/2018  1:29 AM Cuauthemoc Hargrove Add [J06 9] Viral URI       ED Disposition     ED Disposition Condition Comment    Discharge  Christal Mendenhall discharge to home/self care      Condition at discharge: Stable        Follow-up Information    None         Discharge Medication List as of 10/20/2018  1:30 AM      CONTINUE these medications which have NOT CHANGED    Details   ! ! Amphetamine-Dextroamphetamine (ADDERALL PO) Take 25 mg by mouth daily  , Until Discontinued, Historical Med      !! amphetamine-dextroamphetamine (ADDERALL) 10 mg tablet Take 1 tablet by mouth every evening, Starting Sat 6/9/2018, Historical Med      aspirin-acetaminophen-caffeine (EXCEDRIN MIGRAINE) 250-250-65 MG per tablet Take by mouth, Starting Tue 3/31/2015, Historical Med      cetirizine (ZyrTEC) 10 mg tablet Starting Thu 7/19/2018, Historical Med      Cetirizine HCl (ZYRTEC ALLERGY) 10 MG CAPS Take 1 capsule (10 mg total) by mouth daily, Starting Mon 7/2/2018, Normal      !! Cholecalciferol (VITAMIN D-3 PO) Take 5,000 Units by mouth daily  , Until Discontinued, Historical Med      !!  Cholecalciferol (VITAMIN D3) 2000 units capsule TAKE 1 CAPSULE BY MOUTH ONCE DAILY, Normal      cloNIDine (CATAPRES) 0 3 mg tablet Take 1 tablet (0 3 mg total) by mouth daily, Starting Mon 7/2/2018, Normal      cyclobenzaprine (FLEXERIL) 5 mg tablet Take 2 tablets (10 mg total) by mouth 3 (three) times a day as needed for muscle spasms (can cause drowsiness), Starting Tue 3/20/2018, Normal      DEEP SEA NASAL SPRAY 0 65 % nasal spray 1 spray into each nostril 2 (two) times a day as needed for congestion, Starting Mon 7/2/2018, Normal      dicyclomine (BENTYL) 20 mg tablet Take 1 tablet (20 mg total) by mouth 3 (three) times a day as needed (abdominal pain and diarrhea), Starting Thu 4/19/2018, Normal      !! FLOVENT  MCG/ACT inhaler INHALE 2 PUFFS BY MOUTH TWICE DAILY, Normal      fluticasone (FLONASE) 50 mcg/act nasal spray 1 spray into each nostril daily, Starting Mon 7/2/2018, Normal      !! fluticasone (FLOVENT HFA) 110 MCG/ACT inhaler Inhale 2 puffs 2 (two) times a day, Starting Mon 9/14/2015, Historical Med      ketotifen (ZADITOR) 0 025 % ophthalmic solution Administer 1 drop to the right eye 2 (two) times a day, Starting Thu 10/11/2018, Normal      LIQUITEARS 1 4 % ophthalmic solution Administer 1 drop to both eyes as needed for dry eyes, Starting Mon 7/2/2018, Normal      lisdexamfetamine (VYVANSE) 30 MG capsule Take 30 mg by mouth every morning , Until Discontinued, Historical Med      metFORMIN (GLUCOPHAGE) 500 mg tablet Take 500 mg by mouth 2 (two) times a day with meals  , Until Discontinued, Historical Med      montelukast (SINGULAIR) 10 mg tablet Take 1 tablet (10 mg total) by mouth daily, Starting Mon 7/2/2018, Normal      naproxen (NAPROSYN) 500 mg tablet Take 0 5 tablets (250 mg total) by mouth 2 (two) times a day with meals, Starting Tue 3/20/2018, Normal      ranitidine (ZANTAC) 300 MG capsule Take 1 capsule (300 mg total) by mouth 2 (two) times a day, Starting Thu 4/19/2018, Normal      !! VENTOLIN  (90 Base) MCG/ACT inhaler Inhale 2 puffs every 4 (four) hours as needed for wheezing, Starting Fri 3/16/2018, Normal      !! VENTOLIN  (90 Base) MCG/ACT inhaler INHALE 2 PUFFS BY MOUTH EVERY 4 HOURS AS NEEDED, Normal       !! - Potential duplicate medications found  Please discuss with provider  No discharge procedures on file  ED Provider  Attending physically available and evaluated Tiana Flowers  KOLTON managed the patient along with the ED Attending      Electronically Signed by         Rogena Cogan, MD  10/21/18 1427

## 2018-11-06 DIAGNOSIS — J45.909 UNCOMPLICATED ASTHMA, UNSPECIFIED ASTHMA SEVERITY, UNSPECIFIED WHETHER PERSISTENT: ICD-10-CM

## 2018-11-06 DIAGNOSIS — R10.13 DYSPEPSIA: ICD-10-CM

## 2018-11-07 RX ORDER — RANITIDINE 300 MG/1
CAPSULE ORAL
Qty: 90 CAPSULE | Refills: 0 | Status: SHIPPED | OUTPATIENT
Start: 2018-11-07 | End: 2019-02-25 | Stop reason: SDUPTHER

## 2018-11-14 RX ORDER — OLOPATADINE HYDROCHLORIDE 2 MG/ML
SOLUTION/ DROPS OPHTHALMIC
COMMUNITY
Start: 2018-11-01

## 2018-12-18 DIAGNOSIS — J45.909 UNCOMPLICATED ASTHMA, UNSPECIFIED ASTHMA SEVERITY, UNSPECIFIED WHETHER PERSISTENT: ICD-10-CM

## 2019-02-25 ENCOUNTER — LAB REQUISITION (OUTPATIENT)
Dept: LAB | Facility: HOSPITAL | Age: 19
End: 2019-02-25

## 2019-02-25 ENCOUNTER — OFFICE VISIT (OUTPATIENT)
Dept: INTERNAL MEDICINE CLINIC | Facility: CLINIC | Age: 19
End: 2019-02-25

## 2019-02-25 VITALS
DIASTOLIC BLOOD PRESSURE: 76 MMHG | HEART RATE: 104 BPM | TEMPERATURE: 98 F | HEIGHT: 73 IN | SYSTOLIC BLOOD PRESSURE: 110 MMHG | WEIGHT: 315 LBS | BODY MASS INDEX: 41.75 KG/M2

## 2019-02-25 DIAGNOSIS — J45.20 MILD INTERMITTENT ASTHMA WITHOUT COMPLICATION: ICD-10-CM

## 2019-02-25 DIAGNOSIS — J02.9 SORE THROAT: Primary | ICD-10-CM

## 2019-02-25 DIAGNOSIS — J02.9 ACUTE PHARYNGITIS: ICD-10-CM

## 2019-02-25 DIAGNOSIS — R10.13 DYSPEPSIA: ICD-10-CM

## 2019-02-25 PROCEDURE — 99213 OFFICE O/P EST LOW 20 MIN: CPT | Performed by: HOSPITALIST

## 2019-02-25 PROCEDURE — 87070 CULTURE OTHR SPECIMN AEROBIC: CPT | Performed by: INTERNAL MEDICINE

## 2019-02-25 RX ORDER — RANITIDINE 300 MG/1
CAPSULE ORAL
Qty: 90 CAPSULE | Refills: 0 | Status: SHIPPED | OUTPATIENT
Start: 2019-02-25

## 2019-02-25 NOTE — LETTER
February 25, 2019     Patient: Rahul Mustafa   YOB: 2000   Date of Visit: 2/25/2019       To Whom it May Concern:    Pillai Keren is under my professional care  He was seen in my office on 2/25/2019  Please excuse Mr Jonh Orozco from work on Friday- Sunday 2/22-2/24  Thank you for your cooperation with this  If you have any questions or concerns, please don't hesitate to call           Sincerely,          Christin Erickson MD  Internal Medicine

## 2019-02-25 NOTE — PROGRESS NOTES
ASSESSMENT/PLAN:  Problem List Items Addressed This Visit        Respiratory    Asthma     Currently patient states he does have some wheezing and has been using his rescue inhaler every day at least twice  He has also used both his regular scheduled inhalers as well as his nebulizer treatments  No suspected asthma exacerbation however will keep a low threshold  Advise patient that if he continues to use his rescue more frequently he might have to go to the emergency department  Will hold off on starting prednisone until strep comes back negative  Other    Sore throat - Primary    Relevant Orders    Likely secondary to Viral URI  His center criteria is 2  Will obtain rapid swab with culture and f/u on results  Depending may need antibiotics  Continue symptomatic treatment for now  Rapid Strep A Screen With Reflex to Culture, Pediatrics and Compromised Adults          CHIEF COMPLAINT: Sore throat/cough    HISTORY OF PRESENT ILLNESS:  55-year-old male with past medical history of pre diabetes, asthma presented for evaluation of sore throat as well as cough since Friday  Patient states that initially cough was productive of clear sputum  He states that he also had upper respiratory symptoms such as rhinorrhea, congestion, runny nose the along with flu like symptoms  His mother measured his temperature and it was elevated however he cannot recall the exact number  Patient states that today he feels slightly will however he does hear wheezing he is feeling overall malaise however no acute respiratory distress  He has tried over-the-counter medications for symptomatic relief and is slightly improved  Fever   This is a new problem  The current episode started in the past 7 days  The problem occurs intermittently  The problem has been gradually improving  Associated symptoms include myalgias  Pertinent negatives include no chills or weakness   The symptoms are aggravated by eating, drinking and coughing  He has tried acetaminophen and NSAIDs for the symptoms  The treatment provided mild relief  Sore Throat    This is a new problem  The current episode started in the past 7 days  The problem has been gradually improving  The fever has been present for less than 1 day  The pain is at a severity of 7/10  Associated symptoms include trouble swallowing  Pertinent negatives include no drooling  Review of Systems   Constitutional: Negative for activity change and chills  HENT: Positive for sinus pressure, sinus pain, sneezing and trouble swallowing  Negative for drooling and facial swelling  Eyes: Negative for visual disturbance  Respiratory: Negative for chest tightness  Cardiovascular: Negative for leg swelling  Endocrine: Negative for cold intolerance and heat intolerance  Genitourinary: Negative for difficulty urinating  Musculoskeletal: Positive for myalgias  Negative for gait problem  Allergic/Immunologic: Negative  Neurological: Negative  Negative for dizziness, weakness and light-headedness  Hematological: Negative  Psychiatric/Behavioral: Negative  All other systems reviewed and are negative  OBJECTIVE:  Vitals:    02/25/19 1148   BP: 110/76   Pulse: 104   Temp: 98 °F (36 7 °C)   Weight: (!) 169 kg (372 lb 2 2 oz)   Height: 6' 1" (1 854 m)     Physical Exam   Constitutional: He appears well-developed and well-nourished  No distress  HENT:   Right Ear: External ear normal    Left Ear: External ear normal    Mouth/Throat: Oropharynx is clear and moist  No oropharyngeal exudate  Neck: Normal range of motion  Cardiovascular: Regular rhythm and normal heart sounds  tachycardic   Pulmonary/Chest: Effort normal  No respiratory distress  He has wheezes  Lymphadenopathy:     He has cervical adenopathy (tender)           Current Outpatient Medications:     aspirin-acetaminophen-caffeine (EXCEDRIN MIGRAINE) 250-250-65 MG per tablet, Take by mouth, Disp: , Rfl:   Cetirizine HCl (ZYRTEC ALLERGY) 10 MG CAPS, Take 1 capsule (10 mg total) by mouth daily, Disp: 90 capsule, Rfl: 1    Cholecalciferol (VITAMIN D3) 2000 units capsule, TAKE 1 CAPSULE BY MOUTH ONCE DAILY, Disp: 90 capsule, Rfl: 2    cloNIDine (CATAPRES) 0 3 mg tablet, Take 1 tablet (0 3 mg total) by mouth daily, Disp: 30 tablet, Rfl: 0    cyclobenzaprine (FLEXERIL) 5 mg tablet, Take 2 tablets (10 mg total) by mouth 3 (three) times a day as needed for muscle spasms (can cause drowsiness), Disp: 30 tablet, Rfl: 0    DEEP SEA NASAL SPRAY 0 65 % nasal spray, 1 spray into each nostril 2 (two) times a day as needed for congestion, Disp: 30 mL, Rfl: 3    dicyclomine (BENTYL) 20 mg tablet, Take 1 tablet (20 mg total) by mouth 3 (three) times a day as needed (abdominal pain and diarrhea), Disp: 90 tablet, Rfl: 3    FLOVENT  MCG/ACT inhaler, INHALE 2 PUFFS BY MOUTH TWICE DAILY, Disp: 1 Inhaler, Rfl: 5    fluticasone (FLONASE) 50 mcg/act nasal spray, 1 spray into each nostril daily, Disp: 16 g, Rfl: 3    ketotifen (ZADITOR) 0 025 % ophthalmic solution, Administer 1 drop to the right eye 2 (two) times a day, Disp: 5 mL, Rfl: 0    LIQUITEARS 1 4 % ophthalmic solution, Administer 1 drop to both eyes as needed for dry eyes, Disp: 15 mL, Rfl: 3    montelukast (SINGULAIR) 10 mg tablet, Take 1 tablet (10 mg total) by mouth daily, Disp: 90 tablet, Rfl: 1    olopatadine HCl (PATADAY) 0 2 % opth drops, , Disp: , Rfl:     ranitidine (ZANTAC) 300 MG capsule, TAKE 1 CAPSULE BY MOUTH ONCE DAILY, Disp: 90 capsule, Rfl: 0    VENTOLIN  (90 Base) MCG/ACT inhaler, Inhale 2 puffs every 4 (four) hours as needed for wheezing, Disp: 1 Inhaler, Rfl: 0    Amphetamine-Dextroamphetamine (ADDERALL PO), Take 25 mg by mouth daily  , Disp: , Rfl:     amphetamine-dextroamphetamine (ADDERALL) 10 mg tablet, Take 1 tablet by mouth every evening, Disp: , Rfl: 0    cetirizine (ZyrTEC) 10 mg tablet, , Disp: , Rfl:    Cholecalciferol (VITAMIN D-3 PO), Take 5,000 Units by mouth daily  , Disp: , Rfl:     fluticasone (FLOVENT HFA) 110 MCG/ACT inhaler, Inhale 2 puffs 2 (two) times a day, Disp: , Rfl:     lisdexamfetamine (VYVANSE) 30 MG capsule, Take 30 mg by mouth every morning , Disp: , Rfl:     metFORMIN (GLUCOPHAGE) 500 mg tablet, Take 500 mg by mouth 2 (two) times a day with meals  , Disp: , Rfl:     naproxen (NAPROSYN) 500 mg tablet, Take 0 5 tablets (250 mg total) by mouth 2 (two) times a day with meals (Patient not taking: Reported on 2/25/2019), Disp: 360 tablet, Rfl: 0    pseudoephedrine (SUDAFED) 30 mg tablet, Take 1 tablet (30 mg total) by mouth every 6 (six) hours as needed for congestion (Patient not taking: Reported on 2/25/2019), Disp: 30 tablet, Rfl: 0    ranitidine (ZANTAC) 300 MG capsule, Take 1 capsule (300 mg total) by mouth 2 (two) times a day (Patient not taking: Reported on 2/25/2019), Disp: 60 capsule, Rfl: 3    VENTOLIN  (90 Base) MCG/ACT inhaler, INHALE 2 PUFFS BY MOUTH EVERY 4 HOURS AS NEEDED (Patient not taking: Reported on 2/25/2019), Disp: 18 each, Rfl: 2    Past Medical History:   Diagnosis Date    ADHD (attention deficit hyperactivity disorder)     Allergy     Asthma     Hypertension     IBS (irritable bowel syndrome)     Insulin resistance     Migraine     Morbid obesity ([facility] [address] )     Seasonal allergies     Traumatic rupture of radial collateral ligament     resolved 39/83/2781    Umbilical hernia      Past Surgical History:   Procedure Laterality Date    ADENOIDECTOMY      CIRCUMCISION      ESOPHAGOGASTRODUODENOSCOPY      with biopsy    MD EGD TRANSORAL BIOPSY SINGLE/MULTIPLE N/A 6/14/2016    Procedure: ESOPHAGOGASTRODUODENOSCOPY (EGD); Surgeon: Katharina Chau MD;  Location: [facility];   Service: Pediatric Gastrointestinal    TONSILLECTOMY AND ADENOIDECTOMY      UMBILICAL HERNIA REPAIR       Social History     Socioeconomic History    Marital status: Single     Spouse name: Not on file    Number of children: Not on file    Years of education: Not on file    Highest education level: Not on file   Occupational History    Not on file   Social Needs    Financial resource strain: Not on file    Food insecurity:     Worry: Not on file     Inability: Not on file    Transportation needs:     Medical: Not on file     Non-medical: Not on file   Tobacco Use    Smoking status: Never Smoker    Smokeless tobacco: Never Used   Substance and Sexual Activity    Alcohol use: No    Drug use: No    Sexual activity: Not on file   Lifestyle    Physical activity:     Days per week: Not on file     Minutes per session: Not on file    Stress: Not on file   Relationships    Social connections:     Talks on phone: Not on file     Gets together: Not on file     Attends Confucianist service: Not on file     Active member of club or organization: Not on file     Attends meetings of clubs or organizations: Not on file     Relationship status: Not on file    Intimate partner violence:     Fear of current or ex partner: Not on file     Emotionally abused: Not on file     Physically abused: Not on file     Forced sexual activity: Not on file   Other Topics Concern    Not on file   Social History Narrative    Caffeine use    Currently in school    Lives at home with mother and sister , safe at home , no smoking , 2 cats one dog and in 11th grade      Family History   Problem Relation Age of Onset    Diabetes Mother     Asthma Mother     Hypertension Mother     Glaucoma Mother     Migraines Mother     Other Mother         gastric surg for morbid obesity     No Known Problems Father     Hypertension Sister     Allergies Sister         seasonal    Cancer Family         stomach, uteran, breast    Cataracts Family     Cervical cancer Family         primary     Blindness Maternal Grandfather         due to type 2     Diabetes type II Maternal Grandfather     Diabetes type II Maternal Aunt SHRUTI Mathew 71 Internal Medicine PGY-2  [address] [facility]  [facility]  [address] [facility],[address]  [place], [address] [place] [address]

## 2019-02-25 NOTE — ASSESSMENT & PLAN NOTE
Currently patient states he does have some wheezing and has been using his rescue inhaler every day at least twice  He has also used both his regular scheduled inhalers as well as his nebulizer treatments  No suspected asthma exacerbation however will keep a low threshold  Advise patient that if he continues to use his rescue more frequently he might have to go to the emergency department  Will hold off on starting prednisone until strep comes back negative

## 2019-02-27 LAB — BACTERIA THROAT CULT: NORMAL

## 2019-06-26 ENCOUNTER — TELEPHONE (OUTPATIENT)
Dept: INTERNAL MEDICINE CLINIC | Facility: CLINIC | Age: 19
End: 2019-06-26

## 2019-06-26 DIAGNOSIS — E66.01 MORBID OBESITY (HCC): Primary | ICD-10-CM

## 2019-08-07 ENCOUNTER — HOSPITAL ENCOUNTER (EMERGENCY)
Facility: HOSPITAL | Age: 19
Discharge: HOME | End: 2019-08-07
Attending: EMERGENCY MEDICINE

## 2019-08-07 VITALS
HEIGHT: 75 IN | DIASTOLIC BLOOD PRESSURE: 87 MMHG | HEART RATE: 98 BPM | BODY MASS INDEX: 39.17 KG/M2 | TEMPERATURE: 98.6 F | SYSTOLIC BLOOD PRESSURE: 157 MMHG | RESPIRATION RATE: 18 BRPM | OXYGEN SATURATION: 97 % | WEIGHT: 315 LBS

## 2019-08-07 DIAGNOSIS — K59.00 CONSTIPATION: Primary | ICD-10-CM

## 2019-08-07 DIAGNOSIS — R10.84 GENERALIZED ABDOMINAL PAIN: ICD-10-CM

## 2019-08-07 PROCEDURE — 99283 EMERGENCY DEPT VISIT LOW MDM: CPT | Performed by: EMERGENCY MEDICINE

## 2019-08-07 PROCEDURE — 99283 EMERGENCY DEPT VISIT LOW MDM: CPT

## 2019-08-07 RX ORDER — DOCUSATE SODIUM 250 MG
250 CAPSULE ORAL DAILY
Qty: 10 CAPSULE | Refills: 0 | Status: SHIPPED | OUTPATIENT
Start: 2019-08-07

## 2019-08-07 RX ORDER — MAGNESIUM CARB/ALUMINUM HYDROX 105-160MG
296 TABLET,CHEWABLE ORAL ONCE
Qty: 296 ML | Refills: 0 | Status: SHIPPED | OUTPATIENT
Start: 2019-08-07 | End: 2019-08-07

## 2019-08-08 NOTE — ED PROVIDER NOTES
History  Chief Complaint   Patient presents with    Abdominal Pain     Patient complains of abdominal pain  states that he hasn't had a BM in 3 days  States that he did try to use over the counter medications with no relief  This is a 68-year-old male with past medical history of ADHD, GERD, hypertension, and asthma who presents to the emergency department this morning with constipation for the last three days  Patient states that his last bowel movement was three days ago and it was normal with no black or bloody stool  States that he currently feels stool at the rectal vault but can't seem to pass it  He has tried one cap full of over-the-counter MiraLax and one over-the-counter enema with no relief of symptoms  So, the patient is coming into the ER for further evaluation  Patient denies any recent changes in medication and he does not take any narcotics  Patient denies any change in his diet but admits he does not eat much fiber  Patient denies any tobacco, alcohol, or drug use  He denies any significant constipation in the past   Although, the patient does note he has irritable bowel syndrome  Patient does note some mild generalized abdominal pain but denies any fevers, chills, nausea, vomiting, chest pain, shortness of breath, dysuria, hematuria, or any numbness, weakness, or tingling  Prior to Admission Medications   Prescriptions Last Dose Informant Patient Reported? Taking? Amphetamine-Dextroamphetamine (ADDERALL PO) Not Taking at Unknown time  Yes No   Sig: Take 25 mg by mouth daily  Cetirizine HCl (ZYRTEC ALLERGY) 10 MG CAPS   No No   Sig: Take 1 capsule (10 mg total) by mouth daily   Cholecalciferol (VITAMIN D-3 PO)   Yes No   Sig: Take 5,000 Units by mouth daily     Cholecalciferol (VITAMIN D3) 2000 units capsule   No Yes   Sig: TAKE 1 CAPSULE BY MOUTH ONCE DAILY   DEEP SEA NASAL SPRAY 0 65 % nasal spray   No No   Si spray into each nostril 2 (two) times a day as needed for congestion   FLOVENT  MCG/ACT inhaler   No Yes   Sig: INHALE 2 PUFFS BY MOUTH TWICE DAILY   LIQUITEARS 1 4 % ophthalmic solution   No Yes   Sig: Administer 1 drop to both eyes as needed for dry eyes   VENTOLIN  (90 Base) MCG/ACT inhaler   No Yes   Sig: Inhale 2 puffs every 4 (four) hours as needed for wheezing   amphetamine-dextroamphetamine (ADDERALL) 10 mg tablet Not Taking at Unknown time  Yes No   Sig: Take 1 tablet by mouth every evening   aspirin-acetaminophen-caffeine (EXCEDRIN MIGRAINE) 250-250-65 MG per tablet   Yes No   Sig: Take by mouth   cetirizine (ZyrTEC) 10 mg tablet   Yes Yes   cloNIDine (CATAPRES) 0 3 mg tablet   No Yes   Sig: Take 1 tablet (0 3 mg total) by mouth daily   cyclobenzaprine (FLEXERIL) 5 mg tablet Not Taking at Unknown time  No No   Sig: Take 2 tablets (10 mg total) by mouth 3 (three) times a day as needed for muscle spasms (can cause drowsiness)   Patient not taking: Reported on 2019   dicyclomine (BENTYL) 20 mg tablet   No Yes   Sig: Take 1 tablet (20 mg total) by mouth 3 (three) times a day as needed (abdominal pain and diarrhea)   fluticasone (FLONASE) 50 mcg/act nasal spray   No Yes   Si spray into each nostril daily   fluticasone (FLOVENT HFA) 110 MCG/ACT inhaler   Yes No   Sig: Inhale 2 puffs 2 (two) times a day   ketotifen (ZADITOR) 0 025 % ophthalmic solution   No Yes   Sig: Administer 1 drop to the right eye 2 (two) times a day   lisdexamfetamine (VYVANSE) 30 MG capsule   Yes Yes   Sig: Take 30 mg by mouth every morning  metFORMIN (GLUCOPHAGE) 500 mg tablet   Yes Yes   Sig: Take 500 mg by mouth 2 (two) times a day with meals     montelukast (SINGULAIR) 10 mg tablet   No Yes   Sig: Take 1 tablet (10 mg total) by mouth daily   olopatadine HCl (PATADAY) 0 2 % opth drops   Yes Yes   ranitidine (ZANTAC) 300 MG capsule   No Yes   Sig: TAKE 1 CAPSULE BY MOUTH ONCE DAILY      Facility-Administered Medications: None       Past Medical History:   Diagnosis Date  ADHD (attention deficit hyperactivity disorder)     Allergy     Asthma     Hypertension     IBS (irritable bowel syndrome)     Insulin resistance     Migraine     Morbid obesity (HCC)     Seasonal allergies     Traumatic rupture of radial collateral ligament     resolved 89/33/2864    Umbilical hernia        Past Surgical History:   Procedure Laterality Date    ADENOIDECTOMY      CIRCUMCISION      ESOPHAGOGASTRODUODENOSCOPY      with biopsy    MN EGD TRANSORAL BIOPSY SINGLE/MULTIPLE N/A 6/14/2016    Procedure: ESOPHAGOGASTRODUODENOSCOPY (EGD); Surgeon: Jessica Sosa MD;  Location:  GI LAB; Service: Pediatric Gastrointestinal    TONSILLECTOMY AND ADENOIDECTOMY      UMBILICAL HERNIA REPAIR         Family History   Problem Relation Age of Onset    Diabetes Mother     Asthma Mother     Hypertension Mother    Barrington Hero Glaucoma Mother    Barrington Hero Migraines Mother     Other Mother         gastric surg for morbid obesity     No Known Problems Father     Hypertension Sister     Allergies Sister         seasonal    Cancer Family         stomach, uteran, breast    Cataracts Family     Cervical cancer Family         primary     Blindness Maternal Grandfather         due to type 2     Diabetes type II Maternal Grandfather     Diabetes type II Maternal Aunt      I have reviewed and agree with the history as documented  Social History     Tobacco Use    Smoking status: Never Smoker    Smokeless tobacco: Never Used   Substance Use Topics    Alcohol use: No    Drug use: No        Review of Systems   Constitutional: Negative for chills, diaphoresis and fever  HENT: Negative for congestion, rhinorrhea, sinus pressure and sore throat  Eyes: Negative for visual disturbance  Respiratory: Negative for cough, chest tightness and shortness of breath  Cardiovascular: Negative for chest pain  Gastrointestinal: Negative for abdominal pain, constipation, diarrhea, nausea and vomiting     Genitourinary: Negative for dysuria, frequency, hematuria and urgency  Musculoskeletal: Negative for arthralgias and myalgias  Skin: Negative for color change and rash  Neurological: Negative for dizziness, numbness and headaches  Physical Exam  ED Triage Vitals [08/07/19 0830]   Temperature Pulse Respirations Blood Pressure SpO2   98 6 °F (37 °C) 98 18 157/87 97 %      Temp Source Heart Rate Source Patient Position - Orthostatic VS BP Location FiO2 (%)   Oral Monitor Sitting Right arm --      Pain Score       --             Orthostatic Vital Signs  Vitals:    08/07/19 0830   BP: 157/87   Pulse: 98   Patient Position - Orthostatic VS: Sitting       Physical Exam   Constitutional: He is oriented to person, place, and time  He appears well-developed and well-nourished  No distress  HENT:   Head: Normocephalic and atraumatic  Eyes: Pupils are equal, round, and reactive to light  Conjunctivae are normal    Neck: Normal range of motion  Neck supple  No JVD present  Cardiovascular: Normal rate, regular rhythm and normal heart sounds  Exam reveals no gallop and no friction rub  No murmur heard  Pulmonary/Chest: Effort normal and breath sounds normal  No stridor  No respiratory distress  He has no wheezes  He has no rales  Abdominal: Soft  Bowel sounds are normal  He exhibits no distension  There is generalized tenderness (Mild)  There is no guarding  Musculoskeletal: Normal range of motion  He exhibits no edema, tenderness or deformity  Neurological: He is alert and oriented to person, place, and time  No cranial nerve deficit or sensory deficit  He exhibits normal muscle tone  Skin: Skin is warm and dry  No rash noted  He is not diaphoretic  No erythema  No pallor  Psychiatric: He has a normal mood and affect  His behavior is normal    Nursing note and vitals reviewed        ED Medications  Medications - No data to display    Diagnostic Studies  Results Reviewed     None                 No orders to display Procedures  Procedures        ED Course                               MDM  Number of Diagnoses or Management Options  Constipation:   Generalized abdominal pain:   Diagnosis management comments: Patient has abdominal pain likely secondary to his constipation  We will prescribe patient magnesium citrate along with Colace to help the patient passed stool  He was instructed to follow up with his primary care provider should symptoms continue or return to the emergency department sooner should he develop any new or otherwise concerning symptoms  Patient expressed understanding and agreement with the plan  Disposition  Final diagnoses:   Constipation   Generalized abdominal pain     Time reflects when diagnosis was documented in both MDM as applicable and the Disposition within this note     Time User Action Codes Description Comment    8/7/2019  8:39 AM Esme Araiza [K59 [id number]] Constipation     8/7/2019  8:39 AM Esme Araiza [[id number]] Generalized abdominal pain       ED Disposition     ED Disposition Condition Date/Time Comment    Discharge Stable Wed Aug 7, 2019  8:39 AM Christal Mnedenhall discharge to home/self care  Follow-up Information     Follow up With Specialties Details Why Contact Info Additional Information    [facility]  Call   [address] [place] [address] Internal Medicine   [address] [place] [address] [address] [facility] [id number]  [address] [place] [address], [id number]          Discharge Medication List as of 8/7/2019  8:42 AM      CONTINUE these medications which have NOT CHANGED    Details   cetirizine (ZyrTEC) 10 mg tablet Starting Thu 7/19/2018, Historical Med      !!  Cholecalciferol (VITAMIN D3) 2000 units capsule TAKE 1 CAPSULE BY MOUTH ONCE DAILY, Normal      cloNIDine (CATAPRES) 0 3 mg tablet Take 1 tablet (0 3 mg total) by mouth daily, Starting Mon 7/2/2018, Normal dicyclomine (BENTYL) 20 mg tablet Take 1 tablet (20 mg total) by mouth 3 (three) times a day as needed (abdominal pain and diarrhea), Starting Thu 4/19/2018, Normal      !! FLOVENT  MCG/ACT inhaler INHALE 2 PUFFS BY MOUTH TWICE DAILY, Normal      fluticasone (FLONASE) 50 mcg/act nasal spray 1 spray into each nostril daily, Starting Mon 7/2/2018, Normal      ketotifen (ZADITOR) 0 025 % ophthalmic solution Administer 1 drop to the right eye 2 (two) times a day, Starting Thu 10/11/2018, Normal      LIQUITEARS 1 4 % ophthalmic solution Administer 1 drop to both eyes as needed for dry eyes, Starting Mon 7/2/2018, Normal      lisdexamfetamine (VYVANSE) 30 MG capsule Take 30 mg by mouth every morning , Until Discontinued, Historical Med      metFORMIN (GLUCOPHAGE) 500 mg tablet Take 500 mg by mouth 2 (two) times a day with meals  , Until Discontinued, Historical Med      montelukast (SINGULAIR) 10 mg tablet Take 1 tablet (10 mg total) by mouth daily, Starting Mon 7/2/2018, Normal      olopatadine HCl (PATADAY) 0 2 % opth drops Starting Thu 11/1/2018, Historical Med      ranitidine (ZANTAC) 300 MG capsule TAKE 1 CAPSULE BY MOUTH ONCE DAILY, Normal      VENTOLIN  (90 Base) MCG/ACT inhaler Inhale 2 puffs every 4 (four) hours as needed for wheezing, Starting Fri 3/16/2018, Normal      !! Amphetamine-Dextroamphetamine (ADDERALL PO) Take 25 mg by mouth daily  , Until Discontinued, Historical Med      !! amphetamine-dextroamphetamine (ADDERALL) 10 mg tablet Take 1 tablet by mouth every evening, Starting Sat 6/9/2018, Historical Med      aspirin-acetaminophen-caffeine (EXCEDRIN MIGRAINE) 250-250-65 MG per tablet Take by mouth, Starting Tue 3/31/2015, Historical Med      Cetirizine HCl (ZYRTEC ALLERGY) 10 MG CAPS Take 1 capsule (10 mg total) by mouth daily, Starting Mon 7/2/2018, Normal      !! Cholecalciferol (VITAMIN D-3 PO) Take 5,000 Units by mouth daily  , Until Discontinued, Historical Med      cyclobenzaprine (FLEXERIL) 5 mg tablet Take 2 tablets (10 mg total) by mouth 3 (three) times a day as needed for muscle spasms (can cause drowsiness), Starting Tue 3/20/2018, Normal      DEEP SEA NASAL SPRAY 0 65 % nasal spray 1 spray into each nostril 2 (two) times a day as needed for congestion, Starting Mon 7/2/2018, Normal      !! fluticasone (FLOVENT HFA) 110 MCG/ACT inhaler Inhale 2 puffs 2 (two) times a day, Starting Mon 9/14/2015, Historical Med       !! - Potential duplicate medications found  Please discuss with provider  No discharge procedures on file  ED Provider  Attending physically available and evaluated Cheryl Dorina HI managed the patient along with the ED Attending      Electronically Signed by         Karan Mckeon MD  08/07/19 2989

## 2019-08-12 NOTE — ED ATTENDING ATTESTATION
Dale Childers DO, saw and evaluated the patient  I have discussed the patient with the resident/non-physician practitioner and agree with the resident's/non-physician practitioner's findings, Plan of Care, and MDM as documented in the resident's/non-physician practitioner's note, except where noted  All available labs and Radiology studies were reviewed  I was present for key portions of any procedure(s) performed by the resident/non-physician practitioner and I was immediately available to provide assistance  At this point I agree with the current assessment done in the Emergency Department  I have conducted an independent evaluation of this patient a history and physical is as follows:    2-3 days of constipation in healthy 19 yom  Has tried some OTC  No n//vd  No abd surgery  abd exam benign  Plan mag citrate, colace  Jaylan Reese dc  Critical Care Time  Procedures

## 2020-03-18 NOTE — RESULT NOTES
Verified Results  * MRI FINGER LEFT WO CONTRAST [id number] 09:05PM Pedro Cloud Order Number: [id number]   Performing Comments: Left small finger dislocation now with ulnar sided PIP laxity and pain  Please evaluate for UCL injury  - Patient Instructions: Central Scheduling at [phone]   [place]  04/06/2016 @@ 11:00PM    ENTRANCE THROUGH A GO DIRECTLY TO THE MRI DEPARTMENT    PLEASE BRING COPY OF INSURANCE CARD, PHOTO ID, LIST OF MEDS, AND THIS FORM  PLEASE DO NOT Deidra Rugarrett     Test Name Result Flag Reference   MRI FINGER LEFT WO CONTRAST (Report)     MRI LEFT FINGER     INDICATION: Jammed finger playing volleyball, now unable to completely straighten  COMPARISON: Left hand radiograph 3/4/2016, left finger radiograph 3/4/2016  TECHNIQUE:   MR was obtained of the left 5th finger  Localizers, Coronal STIR, sagittal T2 fat sat, Sagittal T1, Axial T1, axial T2 fat sat sequences were obtained  Gadolinium was not utilized  Scan was performed on a 1 5 [place] Unit  FINDINGS:     LEFT 5th FINGER FINDINGS:     SUBCUTANEOUS TISSUES: Normal     FLEXOR/EXTENSOR TENDONS: Intact  MUSCULATURE: Normal      JOINTS: Normal     BONE MARROW SIGNAL: Normal       SOFT TISSUES: There is fluid and edema noted about the 5th digit PIP joint  There is fluid tracking between the radial collateral ligament and the proximal phalanx attachment compatible with tear  The ulnar collateral ligament is intact  There is    edema about the 5th digit PIP joint  OTHER VISUALIZED FINGERS: Unremarkable       IMPRESSION:   1  Tear of the 5th digit PIP joint radial collateral ligament from its proximal insertion  The ulnar collateral ligament is intact  2  No acute osseous abnormality         Workstation performed: [id number]     Signed by:   Blayne Crockett MD   4/7/16 18-Mar-2020 08:55:06

## 2023-04-07 NOTE — RESULT NOTES
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 The patient states she was diagnosed with laryngitis this morning as well. She denies abdominal pain, nausea, vomiting, or overt evidence of bleeding. She feels she is developing diarrhea related to antibiotic use.    Swallowing is improving. She has tolerated full liquids with the exception of some pill dysphagia last night described as slight regurgitation of water causing the need for repeated swallowing. She denies any other episodes of regurgitation.        MEDICATIONS:    The medication list was reviewed today.       OBJECTIVE:    VITAL SIGNS:     Vital Last Value 24 Hour Range   Temperature 97.5 °F (36.4 °C) (04/07/23 0757) Temp  Min: 97 °F (36.1 °C)  Max: 98.1 °F (36.7 °C)   Pulse (!) 59 (04/07/23 0757) Pulse  Min: 59  Max: 86   Respiratory 16 (04/07/23 0757) Resp  Min: 12  Max: 16   Non-Invasive  Blood Pressure 101/51 (04/07/23 0757) BP  Min: 90/50  Max: 130/58   Pulse Oximetry 100 % (04/07/23 0757) SpO2  Min: 96 %  Max: 100 %     Vital Today Admitted   Weight 54.3 kg (119 lb 11.4 oz) (04/04/23 0100) Weight: 56.7 kg (125 lb) (04/03/23 1428)   Height N/A Height: 5' 6\" (167.6 cm) (04/03/23 1428)   BMI N/A BMI (Calculated): 20.18 (04/03/23 1428)     INTAKE/OUTPUT:      Intake/Output Summary (Last 24 hours) at 4/7/2023 1003  Last data filed at 4/6/2023 2325  Gross per 24 hour   Intake 823.87 ml   Output --   Net 823.87 ml         PHYSICAL EXAM:    Awake, alert, oriented. No acute distress. Skin pink, warm, dry, intact. Abd soft, nondistended, nontender.     LABORATORY  Verified Results  (1) COMPREHENSIVE METABOLIC PANEL [id number] 70:34IU Berna Elizondo Order Number: [id number]     Test Name Result Flag Reference   SODIUM 140 mmol/L  136-145   POTASSIUM 3 7 mmol/L  3 5-5 3   CHLORIDE 103 mmol/L  100-108   CARBON DIOXIDE 29 mmol/L  21-32   ANION GAP (CALC) 8 mmol/L  4-13   BLOOD UREA NITROGEN 9 mg/dL  5-25   CREATININE 0 88 mg/dL  0 60-1 30   Standardized to IDMS reference method   CALCIUM 9 7 mg/dL  8 3-10 1   BILI, TOTAL 0 28 mg/dL  0 20-1 00   ALK PHOSPHATAS 108 U/L     ALT (SGPT) 25 U/L  12-78   AST(SGOT) 19 U/L  5-45   ALBUMIN 4 2 g/dL  3 5-5 0   TOTAL PROTEIN 8 4 g/dL H 6 4-8 2   eGFR Non-African American      >60 0 ml/min/1 73sq [facility] Disease Education Program recommendations are as follows:  GFR calculation is accurate only with a steady state creatinine  Chronic Kidney disease less than 60 ml/min/1 73 sq  meters  Kidney failure less than 15 ml/min/1 73 sq  meters  GLUCOSE FASTING 95 mg/dL  65-99     (1) INSULIN, FASTING 15Jun2017 09:00AM Berna Elizondo Order Number: [id number]     Test Name Result Flag Reference   INSULIN, FASTING 92 8 mU/L H 3 0-25 0     (1) LIPID PANEL, FASTING 15Jun2017 09:00AM Berna Elizondo Order Number: [id number]     Test Name Result Flag Reference   CHOLESTEROL 145 mg/dL     HDL,DIRECT 44 mg/dL  40-60   Specimen collection should occur prior to Metamizole administration due to the potential for falsely depressed results  LDL CHOLESTEROL CALCULATED 76 mg/dL  0-100   - Patient Instructions:  This is a fasting blood test  Water,black tea or black  coffee only after 9:00pm the night before test   Drink 2 glasses of water the morning of test       Triglyceride:         Normal              <150 mg/dl       Borderline High    150-199 mg/dl       High               200-499 mg/dl       Very High          >499 mg/dl  Cholesterol:         Desirable        <200 mg/dl      Borderline High 200-239 mg/dl      High             >239 mg/dl  HDL Cholesterol:        High    >59 mg/dL      Low     <41 mg/dL  LDL CALCULATED:    This screening LDL is a calculated result  It does not have the accuracy of the Direct Measured LDL in the monitoring of patients with hyperlipidemia and/or statin therapy  Direct Measure LDL (XTE026) must be ordered separately in these patients  TRIGLYCERIDES 127 mg/dL  <=150   Specimen collection should occur prior to N-Acetylcysteine or Metamizole administration due to the potential for falsely depressed results  (1) TSH WITH FT4 REFLEX [id number] 09:00AM [facility] Order Number: [id number]     Test Name Result Flag Reference   TSH 3 430 uIU/mL  0 463-3 980   Patients undergoing fluorescein dye angiography may retain small amounts of fluorescein in the body for 48-72 hours post procedure  Samples containing fluorescein can produce falsely depressed TSH values  If the patient had this procedure,a specimen should be resubmitted post fluorescein clearance  (1) HEMOGLOBIN A1C [id number] 09:00AM [facility] Order Number: [id number]     Test Name Result Flag Reference   HEMOGLOBIN A1C 5 5 %  4 2-6 3   EST  AVG  GLUCOSE 111 mg/dl       (1) CBC/PLT/DIFF [id number] 09:00AM [facility] Order Number: [id number]     Test Name Result Flag Reference   WBC COUNT 8 47 Thousand/uL  4 31-10 16   RBC COUNT 5 01 Million/uL  3 88-5 62   HEMOGLOBIN 14 9 g/dL  12 0-17 0   HEMATOCRIT 43 8 %  36 5-49 3   MCV 87 fL  82-98   MCH 29 7 pg  26 8-34 3   MCHC 34 0 g/dL  31 4-37 4   RDW 13 3 %  11 6-15 1   MPV 12 3 fL  8 9-12 7   PLATELET COUNT 116 Thousands/uL  149-390   nRBC AUTOMATED 0 /100 WBCs     NEUTROPHILS RELATIVE PERCENT 58 %  43-75   LYMPHOCYTES RELATIVE PERCENT 34 %  14-44   MONOCYTES RELATIVE PERCENT 7 %  4-12   EOSINOPHILS RELATIVE PERCENT 1 %  0-6   BASOPHILS RELATIVE PERCENT 0 %  0-1   NEUTROPHILS ABSOLUTE COUNT 4 86 Thousands/? ? ? L DATA:    4/5/23:    WBC 5.7, hgb 11.0, plt 201  Na 131, K 3.8, BUN 10, Cr 1.19, BUN/Cr 8   AST 20, ALT 15, alk phos 100, T bili 0.3  IMAGING STUDIES:      FL Video Swallow [[id number]] Collected: 04/05/23 1651   Order Status: Completed Updated: 04/05/23 1659   Narrative:     EXAM: FL VIDEO SWALLOW     CLINICAL INDICATION: Dysphagia     COMPARISON: None.    Impression:     FINDINGS/IMPRESSION:     Fluoroscopy was provided during oropharyngeal swallow study performed by   speech pathology. I was present performing the fluoroscopy. Total   fluoroscopy time 96 seconds     No penetration or aspiration     Correlate with official speech pathology report and recommendations.     Electronically Signed by: DEIRDRE JARA MD   Signed on: 4/5/2023 4:56 PM   Workstation ID: [id number]   FL ESOPHAGRAM SINGLE CONTRAST [[id number]] Collected: 04/05/23 1650   Order Status: Completed Updated: 04/05/23 1654   Narrative:     EXAM: FL ESOPHAGRAM SINGLE CONTRAST     CLINICAL INDICATION: Dysphagia     COMPARISON: None.     FINDINGS: Single contrast evaluation of the esophagus was performed under   fluoroscopic assistance.      The patient drank Gastrografin and thin barium which demonstrated normal   motility of the esophagus without focal areas of stricture, dilation or   obstruction.   The esophageal mucosa appears normal without focal abnormalities. There is   no evidence of hiatal hernia.     Mild to moderate gastroesophageal reflux   No extravasation of contrast.     Additionally no intraluminal foreign bodies are evident.    Impression:     Mild to moderate gastroesophageal reflux; otherwise normal esophagram.     Electronically Signed by: DEIRDRE JARA MD   Signed on: 4/5/2023 4:51 PM   Workstation ID: [id number]       CT CHEST WO CONTRAST [[id number]] Collected: 04/04/23 1324   Order Status: Completed Updated: 04/04/23 5856   Narrative:     EXAM: CT CHEST WO CONTRAST     CLINICAL INDICATION: Dyspnea     COMPARISON:   1  85-7 62   LYMPHOCYTES ABSOLUTE COUNT 2 88 Thousands/? ??L  0 60-4 47   MONOCYTES ABSOLUTE COUNT 0 63 Thousand/? ??L  0 17-1 22   EOSINOPHILS ABSOLUTE COUNT 0 07 Thousand/? ??L  0 00-0 61   BASOPHILS ABSOLUTE COUNT 0 01 Thousands/? ??L  0 00-0 10   - Patient Instructions: This bloodwork is non-fasting  Please drink two glasses of water morning of bloodwork  (1) VITAMIN D 25-HYDROXY [id number] 09:00AM Luis Mike Order Number: [id number]     Test Name Result Flag Reference   VIT D 25-HYDROX 45 2 ng/mL  30 0-100 0   This assay is a certified procedure of the CDC Vitamin D Standardization Certification Program (VDSCP)     Deficiency <20ng/ml   Insufficiency 20-30ng/ml   Sufficient  ng/ml     *Patients undergoing fluorescein dye angiography may retain small amounts of fluorescein in the body for 48-72 hours post procedure  Samples containing fluorescein can produce falsely elevated Vitamin D values  If the patient had this procedure, a specimen should be resubmitted post fluorescein clearance         Plan  Please have patient schedule a follow-up appointment with pediatric endocrinology     Signatures   Electronically signed by : FABY Villavicencio; Fernando 15 2017 11:06AM EST                       (Author) None.     TECHNIQUE: Multiplanar noncontrast CT of the chest was performed. mA and/or   kVp was adjusted for patient size.     FINDINGS:     VESSELS:  [Normal caliber thoracic aorta with diffuse atherosclerotic   plaque.  Dense coronary artery calcifications, particularly in the left   anterior descending coronary artery.]     HEART: Normal heart size.  No pericardial effusion.     LYMPH NODES/MEDIASTINUM: Evaluation for hilar lymphadenopathy is precluded   on this noncontrast examination.  No mediastinal or axillary   lymphadenopathy.     LUNGS: Mucous or debris within the trachea.  Biapical pleural scarring.   Moderate to marked emphysematous changes in the lungs.  No focal airspace   consolidation, pleural effusion, or pneumothorax.  Few scattered calcified   granulomas.     BONES/BODY WALL: The soft tissues of the body wall are grossly   unremarkable. No suspicious osseous lesions are identified.     UPPER ABDOMEN: Calcified splenic granulomas.  Atherosclerotic   calcifications of the abdominal aorta.  No acute abnormality in the   partially imaged upper abdomen.    Impression:     1.   No evidence for acute intrathoracic abnormality.   2.   Moderate to marked emphysematous changes within the lungs.   3.   Coronary artery calcifications.     Electronically Signed by: KELLIE SAM M.D.   Signed on: 4/4/2023 2:55 PM   Workstation ID: [id number]   CT NECK SOFT TISSUE WO CONTRAST [[id number]] Collected: 04/04/23 1250   Order Status: Completed Updated: 04/04/23 1257   Narrative:     EXAM: CT NECK SOFT TISSUE WO CONTRAST     CLINICAL INDICATION: Dyspnea, feeling of foreign body in the throat.     COMPARISON: CT soft tissue neck with contrast 04/03/2023.     TECHNIQUE: The study was acquired in a helical fashion with multiplanar   thin section reconstructions.     FINDINGS:   There is no evidence of mucosa associated or deep neck mass, fluid   collection. The salivary and thyroid glands appear unremarkable.  Mild    calcific plaque is present in the partially visualized aortic arch, at the   origins of the great vessels, at the origin of the right vertebral artery,   scattered in the left common carotid artery and at bilateral carotid   bifurcations.  Carotid The neck muscles of the neck appear symmetric and   unremarkable.  Normal size lymph nodes are seen in the normal lymph node   bearing regions of the neck.   It appears that the 1st right molar tooth has been recently extracted.   Chronic degenerative changes cervical spine.     Chronic emphysematous changes upper lung zones.    Impression:     No CT evidence of neck mass, fluid collection or lymph node   enlargement by size criteria.    It appears that the 1st right molar tooth has been recently extracted.   Chronic degenerative changes cervical spine.     Chronic emphysematous changes upper lung zones.     Electronically Signed by: MICHAEL BARRAZA M.D.   Signed on: 4/4/2023 12:54 PM   Workstation ID: [id number]   CT NECK SOFT TISSUE W CONTRAST [[id number]] Collected: 04/03/23 1754   Order Status: Completed Updated: 04/03/23 1801   Narrative:     CT neck with contrast     HISTORY: Difficulty breathing.  Difficulty swallowing.     TECHNIQUE: CT of the neck after IV contrast. Coronal and sagittal   reformatted images were also created. Automated CT dose lowering technique   (adjustment of the mA and/or kV according to the patient size) was   utilized.     COMPARISON: None     FINDINGS:   No adenoid tonsil enlargement.  Unremarkable bilateral palatine tonsillar   pillars.  Normal epiglottis.  Unremarkable retropharyngeal soft tissue.   There is mucus in the infraglottic trachea.  Severe emphysema at the lung   apices.  Small probable mucous retention cyst in the right maxillary sinus.    Fluid in the right mastoid air cells.     Unremarkable bilateral and parotid and submandibular glands.  Atrophic   thyroid gland, correlate with thyroid function serology.     Mildly  atherosclerotic arteries.     No enlarged cervical lymph node.     Chronic degenerative disc disease and facet arthropathy of the cervical   spine.    Impression:     1.  Mucus in the trachea.   2.  Pulmonary emphysema.   3.  Fluid in the right mastoid air cells.  Correlate with right   mastoiditis.   4.  Atrophic thyroid gland.  Correlate with thyroid function serology.     Electronically Signed by: ALIZE ASHER M.D.   Signed on: 4/3/2023 5:58 PM   Workstation ID: [id number]   XR CHEST AP OR PA [[id number]] Collected: 04/03/23 2162   Order Status: Completed Updated: 04/03/23 9828   Narrative:     Portable chest     HISTORY: Cough.  Difficulty swallowing.     COMPARISON: 02/08/2012    Impression:     DESCRIPTION/IMPRESSION:   Chronic hyperinflation and fibroemphysematous changes.  No confluent   airspace consolidation.  No pleural effusion.  No pneumothorax.  No   vascular congestion.  Mildly atherosclerotic thoracic aorta and arterial   calcifications elsewhere.  Mild scoliosis.     Electronically Signed by: ALIZE ASHER M.D.   Signed on: 4/3/2023 5:41 PM   Workstation ID: [id number]                                ASSESSMENT:     73y old female with multiple medical conditions admitted with 1-2 weeks of progressive dysphagia with liquids and solids, dyspnea, and throat pain. GI-related evaluations thus far, including barium esophagram and EGD, have shown mild reflux but no evidence of esophageal stricture, mass, dysmotility, or significant esophagitis. Meanwhile, she has been diagnosed with laryngitis and sputum cultures have returned positive, as above, which are being addressed by the pulmonology, ID, and ENT services. This care/input is appreciated. She does feel the swallowing has improved as she is tolerating full liquids aside from 1 minor episode of regurgitation with oral medications last night. She denies any additional GI symptoms and is eager for discharge.       ANTIPLATELET /  ANTICOAGULATION:  Xarelto    PLAN:     Antibiotics per ID service - care/input appreciated   Continue BID PPI   Regular diet as tolerated   OK for discharge once cleared by remainder of care team   If dysphagia persists/recurs despite treatment for underlying respiratory process, can consider outpatient esophageal manometry.   We will continue to follow.                     Case discussed with, and patient seen by, Dr. Robertson

## 2023-06-12 ENCOUNTER — TELEPHONE (OUTPATIENT)
Dept: PSYCHIATRY | Facility: CLINIC | Age: 23
End: 2023-06-12

## 2023-06-12 NOTE — TELEPHONE ENCOUNTER
Patient has been added to the Medication Management  And talk therapy wait list without a referral     Insurance: [id number] Type:    Commercial []   Medicaid [x]   [place] (if applicable)   Medicare []  Location Preference: [place]  Provider Preference:female  Virtual: Yes [] No [x]

## 2023-07-06 NOTE — PROGRESS NOTES
Name: Lefty Monet      : 2000      MRN: [id number]  Encounter Provider: Damaso Corcoran MD  Encounter Date: 2023   Encounter department: [address] [facility],[address]     1. Primary hypertension  Assessment & Plan:  · Diagnosed more then 10 years ago  · Currently on HCTZ 25 mg and amlodipine 10 mg  · Today's BP: 138/84 mmHg  · Didn't take medication today because he ran out yesterday  Plan:  Refill medications    Orders:  -     amLODIPine (NORVASC) 10 mg tablet; Take 1 tablet (10 mg total) by mouth daily  -     hydrochlorothiazide (HYDRODIURIL) 25 mg tablet; Take 1 tablet (25 mg total) by mouth daily  -     Comprehensive metabolic panel; Future  -     Comprehensive metabolic panel    2. Mild intermittent asthma without complication  Assessment & Plan:  · Diagnosed during childhood  · Patient reports needing albuterol less than once a month  · On Advair BID as well as montelukast  · No nocturnal symptoms  · Never hospitalized for asthma  · PE: normal lung sounds    Plan:  · Continue current regimen      3. Morbid obesity ([address])  Assessment & Plan:  · BMI 58.05  · Reports eating lots of processed food and fast food. States that he can eat for 3 people. States that eating habits worsen since getting out of custodial in May because he wants to enjoy the food he did not have access while he was incarcerated  · Patient is motivated to get healthier and has started to work out his arms at home, but is struggling to eat healthy    Plan:  ·  Patient was educated about exercises to do at home and healthier options for snacks and meals  · Weight management referral   · CMP, lipid panel and A1c       Orders:  -     Comprehensive metabolic panel; Future  -     HEMOGLOBIN A1C W/ EAG ESTIMATION; Future  -     Lipid panel; Future  -     Comprehensive metabolic panel  -     Lipid panel  -     Ambulatory Referral to Weight Management; Future    4.  Seasonal allergic rhinitis due to other allergic trigger  -     Cetirizine HCl (ZyrTEC Allergy) 10 MG CAPS; Take 1 capsule (10 mg total) by mouth daily  -     montelukast (SINGULAIR) 10 mg tablet; Take 1 tablet (10 mg total) by mouth daily    5. Need for hepatitis C screening test  -     Hepatitis C Antibody; Future    6. Screening for HIV (human immunodeficiency virus)  -     HIV 1/2 AG/AB w Reflex [facility] for 2 yr old and above; Future    BMI Counseling: Body mass index is 58.05 kg/m². The BMI is above normal. Nutrition recommendations include decreasing portion sizes, decreasing fast food intake and consuming healthier snacks. Exercise recommendations include exercising 3-5 times per week. Rationale for BMI follow-up plan is due to patient being overweight or obese. Depression Screening and Follow-up Plan: Patient was screened for depression during today's encounter. They screened negative with a PHQ-2 score of 2. Subjective      This is 20 y.o male with PMH of prediabetes, ADHD, asthma and obesity who presents today to establish care. Review of Systems   Constitutional: Negative for chills and fever. HENT: Negative for ear pain and sore throat. Eyes: Negative for pain and visual disturbance. Respiratory: Negative for cough and shortness of breath. Cardiovascular: Negative for chest pain and palpitations. Gastrointestinal: Negative for abdominal pain and vomiting. Genitourinary: Negative for dysuria and hematuria. Musculoskeletal: Negative for arthralgias and back pain. Skin: Negative for color change and rash. Neurological: Negative for seizures and syncope. All other systems reviewed and are negative. Current Outpatient Medications on File Prior to Visit   Medication Sig   • Fluticasone-Salmeterol (Advair) 250-50 mcg/dose inhaler Inhale 1 puff 2 (two) times a day Rinse mouth after use. • Amphetamine-Dextroamphetamine (ADDERALL PO) Take 25 mg by mouth daily.    • aspirin-acetaminophen-caffeine (Jay Friar MIGRAINE) 250-250-65 MG per tablet Take by mouth   • cetirizine (ZyrTEC) 10 mg tablet    • Cholecalciferol (VITAMIN D-3 PO) Take 5,000 Units by mouth daily. • Cholecalciferol (VITAMIN D3) 2000 units capsule TAKE 1 CAPSULE BY MOUTH ONCE DAILY   • cloNIDine (CATAPRES) 0.3 mg tablet Take 1 tablet (0.3 mg total) by mouth daily   • DEEP SEA NASAL SPRAY 0.65 % nasal spray 1 spray into each nostril 2 (two) times a day as needed for congestion   • dicyclomine (BENTYL) 20 mg tablet Take 1 tablet (20 mg total) by mouth 3 (three) times a day as needed (abdominal pain and diarrhea)   • docusate sodium (COLACE) 250 MG capsule Take 1 capsule (250 mg total) by mouth daily   • fluticasone (FLONASE) 50 mcg/act nasal spray 1 spray into each nostril daily   • ketotifen (ZADITOR) 0.025 % ophthalmic solution Administer 1 drop to the right eye 2 (two) times a day   • LIQUITEARS 1.4 % ophthalmic solution Administer 1 drop to both eyes as needed for dry eyes   • magnesium citrate (CITROMA) 1.745 g/30 mL oral solution Take 296 mL by mouth once for 1 dose   • olopatadine HCl (PATADAY) 0.2 % opth drops    • ranitidine (ZANTAC) 300 MG capsule TAKE 1 CAPSULE BY MOUTH ONCE DAILY   • [DISCONTINUED] amphetamine-dextroamphetamine (ADDERALL) 10 mg tablet Take 1 tablet by mouth every evening   • [DISCONTINUED] Cetirizine HCl (ZYRTEC ALLERGY) 10 MG CAPS Take 1 capsule (10 mg total) by mouth daily   • [DISCONTINUED] cyclobenzaprine (FLEXERIL) 5 mg tablet Take 2 tablets (10 mg total) by mouth 3 (three) times a day as needed for muscle spasms (can cause drowsiness) (Patient not taking: Reported on 8/7/2019)   • [DISCONTINUED] FLOVENT  MCG/ACT inhaler INHALE 2 PUFFS BY MOUTH TWICE DAILY   • [DISCONTINUED] fluticasone (FLOVENT HFA) 110 MCG/ACT inhaler Inhale 2 puffs 2 (two) times a day   • [DISCONTINUED] lisdexamfetamine (VYVANSE) 30 MG capsule Take 30 mg by mouth every morning.    • [DISCONTINUED] metFORMIN (GLUCOPHAGE) 500 mg tablet Take 500 mg by mouth 2 (two) times a day with meals. • [DISCONTINUED] montelukast (SINGULAIR) 10 mg tablet Take 1 tablet (10 mg total) by mouth daily   • [DISCONTINUED] VENTOLIN  (90 Base) MCG/ACT inhaler Inhale 2 puffs every 4 (four) hours as needed for wheezing       Objective     /84 (BP Location: Right arm, Patient Position: Sitting, Cuff Size: Large)   Pulse 88   Temp 97.8 °F (36.6 °C) (Temporal)   Resp 18   Ht 6' (1.829 m)   Wt (!) 194 kg (428 lb)   SpO2 98%   BMI 58.05 kg/m²     Physical Exam  Constitutional:       General: He is not in acute distress. Appearance: Normal appearance. He is obese. He is not toxic-appearing. HENT:      Head: Normocephalic. Right Ear: External ear normal.      Left Ear: External ear normal.      Nose: Nose normal. No congestion or rhinorrhea. Eyes:      General: No scleral icterus. Conjunctiva/sclera: Conjunctivae normal.   Cardiovascular:      Rate and Rhythm: Normal rate and regular rhythm. Pulses: Normal pulses. Heart sounds: No murmur heard. No friction rub. No gallop. Pulmonary:      Effort: Pulmonary effort is normal. No respiratory distress. Breath sounds: No wheezing, rhonchi or rales. Abdominal:      General: Abdomen is flat. Bowel sounds are normal. There is no distension. Palpations: Abdomen is soft. Tenderness: There is no abdominal tenderness. There is no guarding. Musculoskeletal:      Cervical back: Normal range of motion. Right lower leg: No edema. Left lower leg: No edema. Skin:     General: Skin is warm and dry. Capillary Refill: Capillary refill takes less than 2 seconds. Neurological:      General: No focal deficit present. Mental Status: He is alert and oriented to person, place, and time.    Psychiatric:         Mood and Affect: Mood normal.         Behavior: Behavior normal.       [address], [place]

## 2023-07-07 ENCOUNTER — OFFICE VISIT (OUTPATIENT)
Dept: FAMILY MEDICINE CLINIC | Facility: CLINIC | Age: 23
End: 2023-07-07

## 2023-07-07 VITALS
SYSTOLIC BLOOD PRESSURE: 138 MMHG | WEIGHT: 315 LBS | OXYGEN SATURATION: 98 % | BODY MASS INDEX: 42.66 KG/M2 | HEART RATE: 88 BPM | HEIGHT: 72 IN | RESPIRATION RATE: 18 BRPM | DIASTOLIC BLOOD PRESSURE: 84 MMHG | TEMPERATURE: 97.8 F

## 2023-07-07 DIAGNOSIS — I10 PRIMARY HYPERTENSION: Primary | ICD-10-CM

## 2023-07-07 DIAGNOSIS — J45.20 MILD INTERMITTENT ASTHMA WITHOUT COMPLICATION: ICD-10-CM

## 2023-07-07 DIAGNOSIS — E66.01 MORBID OBESITY (HCC): ICD-10-CM

## 2023-07-07 DIAGNOSIS — Z11.59 NEED FOR HEPATITIS C SCREENING TEST: ICD-10-CM

## 2023-07-07 DIAGNOSIS — Z11.4 SCREENING FOR HIV (HUMAN IMMUNODEFICIENCY VIRUS): ICD-10-CM

## 2023-07-07 DIAGNOSIS — J30.89 SEASONAL ALLERGIC RHINITIS DUE TO OTHER ALLERGIC TRIGGER: ICD-10-CM

## 2023-07-07 RX ORDER — AMLODIPINE BESYLATE 10 MG/1
10 TABLET ORAL DAILY
Qty: 60 TABLET | Refills: 1 | Status: SHIPPED | OUTPATIENT
Start: 2023-07-07

## 2023-07-07 RX ORDER — CETIRIZINE HYDROCHLORIDE 10 MG/1
10 CAPSULE, LIQUID FILLED ORAL DAILY
Qty: 90 CAPSULE | Refills: 1 | Status: SHIPPED | OUTPATIENT
Start: 2023-07-07

## 2023-07-07 RX ORDER — MONTELUKAST SODIUM 10 MG/1
10 TABLET ORAL DAILY
Qty: 90 TABLET | Refills: 1 | Status: SHIPPED | OUTPATIENT
Start: 2023-07-07

## 2023-07-07 RX ORDER — FLUTICASONE PROPIONATE AND SALMETEROL 250; 50 UG/1; UG/1
1 POWDER RESPIRATORY (INHALATION) 2 TIMES DAILY
COMMUNITY

## 2023-07-07 RX ORDER — HYDROCHLOROTHIAZIDE 25 MG/1
25 TABLET ORAL DAILY
Qty: 60 TABLET | Refills: 1 | Status: SHIPPED | OUTPATIENT
Start: 2023-07-07

## 2023-07-07 NOTE — ASSESSMENT & PLAN NOTE
· Diagnosed during childhood  · Patient reports needing albuterol less than once a month  · On Advair BID as well as montelukast  · No nocturnal symptoms  · Never hospitalized for asthma  · PE: normal lung sounds    Plan:  · Continue current regimen

## 2023-07-07 NOTE — ASSESSMENT & PLAN NOTE
· BMI 58.05  · Reports eating lots of processed food and fast food. States that he can eat for 3 people.  States that eating habits worsen since getting out of [facility] in May because he wants to enjoy the food he did not have access while he was incarcerated  · Patient is motivated to get healthier and has started to work out his arms at home, but is struggling to eat healthy    Plan:  ·  Patient was educated about exercises to do at home and healthier options for snacks and meals  · Weight management referral   · CMP, lipid panel and A1c

## 2023-07-07 NOTE — ASSESSMENT & PLAN NOTE
· Diagnosed more then 10 years ago  · Currently on HCTZ 25 mg and amlodipine 10 mg  · Today's BP: 138/84 mmHg  · Didn't take medication today because he ran out yesterday  Plan:  Refill medications

## 2023-07-07 NOTE — ASSESSMENT & PLAN NOTE
· Reports eating lots of processed food and fast food. States that he can eat for 3 people.  States that eating habits worsen since getting out of [facility] in May because he wants to enjoy the food he did not have access while he was incarcerated  · Patient is motivated to get healthier and has started to work out his arms at home, but is struggling to eat healthy    Plan:  ·  Patient was educated about exercises to do at home and healthier options for snacks and meals  · Weight management referral   · CMP, lipid panel and A1c

## 2023-07-08 ENCOUNTER — APPOINTMENT (OUTPATIENT)
Dept: LAB | Facility: HOSPITAL | Age: 23
End: 2023-07-08
Payer: COMMERCIAL

## 2023-07-08 DIAGNOSIS — Z11.4 SCREENING FOR HIV (HUMAN IMMUNODEFICIENCY VIRUS): ICD-10-CM

## 2023-07-08 DIAGNOSIS — Z11.59 NEED FOR HEPATITIS C SCREENING TEST: ICD-10-CM

## 2023-07-08 DIAGNOSIS — E66.01 MORBID OBESITY (HCC): ICD-10-CM

## 2023-07-08 PROCEDURE — 86803 HEPATITIS C AB TEST: CPT

## 2023-07-08 PROCEDURE — 87389 HIV-1 AG W/HIV-1&-2 AB AG IA: CPT

## 2023-07-08 PROCEDURE — 83036 HEMOGLOBIN GLYCOSYLATED A1C: CPT

## 2023-07-08 PROCEDURE — 36415 COLL VENOUS BLD VENIPUNCTURE: CPT

## 2023-07-09 LAB
HCV AB SER QL: NORMAL
HIV 1+2 AB+HIV1 P24 AG SERPL QL IA: NORMAL
HIV 2 AB SERPL QL IA: NORMAL
HIV1 AB SERPL QL IA: NORMAL
HIV1 P24 AG SERPL QL IA: NORMAL

## 2023-07-11 LAB
EST. AVERAGE GLUCOSE BLD GHB EST-MCNC: 117 MG/DL
HBA1C MFR BLD: 5.7 %

## 2023-07-12 ENCOUNTER — TELEPHONE (OUTPATIENT)
Dept: OTHER | Facility: HOSPITAL | Age: 23
End: 2023-07-12

## 2023-07-30 NOTE — ASSESSMENT & PLAN NOTE
Lab Results   Component Value Date    HGBA1C 5.7 (H) 07/08/2023   · Patient reports eating healthier for the past 2 weeks  · Referral to weight management program was given during last visit    Plan:  · Lifestyle modification  · Patient was encouraged to follow with weight management

## 2023-07-30 NOTE — PROGRESS NOTES
[facility]    NAME: Christal Gonzalez  AGE: 20 y.o. SEX: male  : 2000     DATE: 2023     Assessment and Plan:     Problem List Items Addressed This Visit        Respiratory    Asthma    Relevant Medications    Fluticasone-Salmeterol (Advair) 250-50 mcg/dose inhaler    Other Relevant Orders    Complete PFT with post bronchodilator       Other    Prediabetes     Lab Results   Component Value Date    HGBA1C 5.7 (H) 2023 ·   Patient reports eating healthier for the past 2 weeks  · Referral to weight management program was given during last visit    Plan:  · Lifestyle modification  · Patient was encouraged to follow with weight management            Morbid obesity with BMI of 45.0-49.9, adult ([address])     · BMI went down from 58.05 to 56.6  · Reports eating  Less now and walking the dog   Patient is motivated to continue lifestyle modifications  · Weight management referral was given last visit  · Lab work was reviewed      Plan:  ·  Patient was educated about exercises to do at home and healthier options for snacks and meals             Other Visit Diagnoses     Encounter for general adult medical examination with abnormal findings    -  Primary    Dyspepsia        Relevant Medications    ranitidine (ZANTAC) 150 MG capsule          Immunizations and preventive care screenings were discussed with patient today. Appropriate education was printed on patient's after visit summary. Counseling:  Alcohol/drug use: discussed moderation in alcohol intake, the recommendations for healthy alcohol use, and avoidance of illicit drug use. Dental Health: discussed importance of regular tooth brushing, flossing, and dental visits. Injury prevention: discussed safety/seat belts, safety helmets, smoke detectors, carbon dioxide detectors, and smoking near bedding or upholstery.   Sexual health: discussed sexually transmitted diseases, partner selection, use of condoms, avoidance of unintended pregnancy, and contraceptive alternatives. · Exercise: the importance of regular exercise/physical activity was discussed. Recommend exercise 3-5 times per week for at least 30 minutes. Return in about 2 weeks (around 8/18/2023) for weight manahgement (Dr. Casandra Lisa) . Chief Complaint:     Chief Complaint   Patient presents with   • Follow-up     Lab results       History of Present Illness:     Adult Annual Physical   Patient here for a comprehensive physical exam. The patient reports no problems. Diet and Physical Activity  · Diet/Nutrition: well balanced diet, limited junk food and consuming 3-5 servings of fruits/vegetables daily. · Exercise: walking. Depression Screening  PHQ-2/9 Depression Screening         General Health  · Sleep: gets 7-8 hours of sleep on average. · Hearing: normal - bilateral.  · Vision: most recent eye exam <1 year ago and wears glasses. · Dental: no dental visits for >1 year, brushes teeth twice daily and flosses teeth occasionally.  Health  · History of STDs?: no.     Review of Systems:     Review of Systems   Constitutional: Negative for chills and fever. HENT: Negative for ear pain and sore throat. Eyes: Negative for pain and visual disturbance. Respiratory: Negative for cough and shortness of breath. Cardiovascular: Negative for chest pain and palpitations. Gastrointestinal: Negative for abdominal pain and vomiting. Genitourinary: Negative for dysuria and hematuria. Musculoskeletal: Negative for arthralgias and back pain. Skin: Negative for color change and rash. Neurological: Negative for seizures and syncope. All other systems reviewed and are negative.      Past Medical History:     Past Medical History:   Diagnosis Date   • ADHD (attention deficit hyperactivity disorder)    • Allergy    • Asthma    • Hypertension    • IBS (irritable bowel syndrome)    • Insulin resistance    • Migraine    • Morbid obesity (HCC)    • Seasonal allergies    • Traumatic rupture of radial collateral ligament     resolved 76/66/7623   • Umbilical hernia       Past Surgical History:     Past Surgical History:   Procedure Laterality Date   • ADENOIDECTOMY     • CIRCUMCISION     • ESOPHAGOGASTRODUODENOSCOPY      with biopsy   • [place] EGD TRANSORAL BIOPSY SINGLE/MULTIPLE N/A 6/14/2016    Procedure: ESOPHAGOGASTRODUODENOSCOPY (EGD); Surgeon: Velasquez Asher MD;  Location: [facility]; Service: Pediatric Gastrointestinal   • TONSILLECTOMY AND ADENOIDECTOMY     • UMBILICAL HERNIA REPAIR        Social History:     Social History     Socioeconomic History   • Marital status: Single     Spouse name: None   • Number of children: None   • Years of education: None   • Highest education level: None   Occupational History   • None   Tobacco Use   • Smoking status: Never     Passive exposure: Never   • Smokeless tobacco: Never   Substance and Sexual Activity   • Alcohol use: No   • Drug use: No   • Sexual activity: None   Other Topics Concern   • None   Social History Narrative    Caffeine use    Currently in school    Lives at home with mother and sister , safe at home , no smoking , 2 cats one dog and in 11th grade      Social Determinants of Health     Financial Resource Strain: Medium Risk (6/30/2023)    Overall Financial Resource Strain (CARDIA)    • Difficulty of Paying Living Expenses: Somewhat hard   Food Insecurity: Unknown (6/30/2023)    Hunger Vital Sign    • Worried About Running Out of Food in the Last Year: Patient refused    • Ran Out of Food in the Last Year: Patient refused   Transportation Needs: No Transportation Needs (6/30/2023)    PRAPARE - Transportation    • Lack of Transportation (Medical): No    • Lack of Transportation (Non-Medical):  No   Physical Activity: Not on file   Stress: Not on file   Social Connections: Not on file   Intimate Partner Violence: Not on file   Housing Stability: Not on file      Family History:     Family History   Problem Relation Age of Onset   • Diabetes Mother    • Asthma Mother    • Hypertension Mother    • Glaucoma Mother    • Migraines Mother    • Other Mother         gastric surg for morbid obesity    • No Known Problems Father    • Hypertension Sister    • Allergies Sister         seasonal   • Cancer Family         stomach, uteran, breast   • Cataracts Family    • Cervical cancer Family         primary    • Blindness Maternal Grandfather         due to type 2    • Diabetes type II Maternal Grandfather    • Diabetes type II Maternal Aunt       Current Medications:     Current Outpatient Medications   Medication Sig Dispense Refill   • Fluticasone-Salmeterol (Advair) 250-50 mcg/dose inhaler Inhale 1 puff 2 (two) times a day Rinse mouth after use. 60 blister 1   • ranitidine (ZANTAC) 150 MG capsule Take 1 capsule (150 mg total) by mouth daily 60 capsule 1   • amLODIPine (NORVASC) 10 mg tablet Take 1 tablet (10 mg total) by mouth daily 60 tablet 1   • Amphetamine-Dextroamphetamine (ADDERALL PO) Take 25 mg by mouth daily. • aspirin-acetaminophen-caffeine (EXCEDRIN MIGRAINE) 250-250-65 MG per tablet Take by mouth     • cetirizine (ZyrTEC) 10 mg tablet      • Cetirizine HCl (ZyrTEC Allergy) 10 MG CAPS Take 1 capsule (10 mg total) by mouth daily 90 capsule 1   • Cholecalciferol (VITAMIN D-3 PO) Take 5,000 Units by mouth daily.      • Cholecalciferol (VITAMIN D3) 2000 units capsule TAKE 1 CAPSULE BY MOUTH ONCE DAILY 90 capsule 2   • cloNIDine (CATAPRES) 0.3 mg tablet Take 1 tablet (0.3 mg total) by mouth daily 30 tablet 0   • DEEP SEA NASAL SPRAY 0.65 % nasal spray 1 spray into each nostril 2 (two) times a day as needed for congestion 30 mL 3   • dicyclomine (BENTYL) 20 mg tablet Take 1 tablet (20 mg total) by mouth 3 (three) times a day as needed (abdominal pain and diarrhea) 90 tablet 3   • docusate sodium (COLACE) 250 MG capsule Take 1 capsule (250 mg total) by mouth daily 10 capsule 0   • fluticasone (FLONASE) 50 mcg/act nasal spray 1 spray into each nostril daily 16 g 3   • hydrochlorothiazide (HYDRODIURIL) 25 mg tablet Take 1 tablet (25 mg total) by mouth daily 60 tablet 1   • ketotifen (ZADITOR) 0.025 % ophthalmic solution Administer 1 drop to the right eye 2 (two) times a day 5 mL 0   • LIQUITEARS 1.4 % ophthalmic solution Administer 1 drop to both eyes as needed for dry eyes 15 mL 3   • magnesium citrate (CITROMA) 1.745 g/30 mL oral solution Take 296 mL by mouth once for 1 dose 296 mL 0   • montelukast (SINGULAIR) 10 mg tablet Take 1 tablet (10 mg total) by mouth daily 90 tablet 1   • olopatadine HCl (PATADAY) 0.2 % opth drops        No current facility-administered medications for this visit. Allergies: Allergies   Allergen Reactions   • Dog Epithelium    • Other Other (See Comments)     Congestion, itchy eyes   • Penicillin V      Reaction Date: [id number];    • Pollen Extract Other (See Comments)     Congestion, itchy eyes   • Penicillins Hives, Swelling and Rash      Physical Exam:     /88 (BP Location: Left arm, Patient Position: Sitting, Cuff Size: Standard)   Pulse 71   Temp 98.1 °F (36.7 °C) (Temporal)   Resp 21   Ht 6' (1.829 m)   Wt (!) 190 kg (417 lb 12.8 oz)   SpO2 97%   BMI 56.66 kg/m²     Physical Exam  Vitals and nursing note reviewed. Constitutional:       General: He is not in acute distress. Appearance: He is well-developed. He is obese. HENT:      Head: Normocephalic and atraumatic. Right Ear: External ear normal.      Left Ear: External ear normal.      Mouth/Throat:      Pharynx: Oropharynx is clear. No oropharyngeal exudate or posterior oropharyngeal erythema. Eyes:      General: No scleral icterus. Conjunctiva/sclera: Conjunctivae normal.   Cardiovascular:      Rate and Rhythm: Normal rate and regular rhythm. Heart sounds: No murmur heard. No gallop.    Pulmonary:      Effort: Pulmonary effort is normal. No respiratory distress. Breath sounds: Normal breath sounds. Abdominal:      General: There is no distension. Palpations: Abdomen is soft. Tenderness: There is no abdominal tenderness. There is no guarding. Musculoskeletal:      Cervical back: Neck supple. Right lower leg: No edema. Left lower leg: No edema. Skin:     General: Skin is warm and dry. Capillary Refill: Capillary refill takes less than 2 seconds. Neurological:      General: No focal deficit present. Mental Status: He is alert and oriented to person, place, and time.    Psychiatric:         Mood and Affect: Mood normal.         Behavior: Behavior normal.          [address], MD   [facility]

## 2023-08-04 ENCOUNTER — OFFICE VISIT (OUTPATIENT)
Dept: FAMILY MEDICINE CLINIC | Facility: CLINIC | Age: 23
End: 2023-08-04

## 2023-08-04 VITALS
TEMPERATURE: 98.1 F | RESPIRATION RATE: 21 BRPM | WEIGHT: 315 LBS | BODY MASS INDEX: 42.66 KG/M2 | DIASTOLIC BLOOD PRESSURE: 88 MMHG | HEIGHT: 72 IN | SYSTOLIC BLOOD PRESSURE: 138 MMHG | OXYGEN SATURATION: 97 % | HEART RATE: 71 BPM

## 2023-08-04 DIAGNOSIS — R73.03 PREDIABETES: ICD-10-CM

## 2023-08-04 DIAGNOSIS — Z00.01 ENCOUNTER FOR GENERAL ADULT MEDICAL EXAMINATION WITH ABNORMAL FINDINGS: Primary | ICD-10-CM

## 2023-08-04 DIAGNOSIS — J45.20 MILD INTERMITTENT ASTHMA WITHOUT COMPLICATION: ICD-10-CM

## 2023-08-04 DIAGNOSIS — E66.01 MORBID OBESITY WITH BMI OF 45.0-49.9, ADULT (HCC): ICD-10-CM

## 2023-08-04 DIAGNOSIS — R10.13 DYSPEPSIA: ICD-10-CM

## 2023-08-04 PROCEDURE — 99213 OFFICE O/P EST LOW 20 MIN: CPT | Performed by: FAMILY MEDICINE

## 2023-08-04 PROCEDURE — 99395 PREV VISIT EST AGE 18-39: CPT | Performed by: FAMILY MEDICINE

## 2023-08-04 RX ORDER — RANITIDINE 150 MG/1
150 CAPSULE ORAL DAILY
Qty: 60 CAPSULE | Refills: 1 | Status: SHIPPED | OUTPATIENT
Start: 2023-08-04

## 2023-08-04 RX ORDER — FLUTICASONE PROPIONATE AND SALMETEROL 250; 50 UG/1; UG/1
1 POWDER RESPIRATORY (INHALATION) 2 TIMES DAILY
Qty: 60 BLISTER | Refills: 1 | Status: SHIPPED | OUTPATIENT
Start: 2023-08-04

## 2023-08-04 NOTE — ASSESSMENT & PLAN NOTE
· BMI went down from 58.05 to 56.6  · Reports eating  Less now and walking the dog   Patient is motivated to continue lifestyle modifications  · Weight management referral was given last visit  · Lab work was reviewed      Plan:  ·  Patient was educated about exercises to do at home and healthier options for snacks and meals

## 2023-08-22 NOTE — PROGRESS NOTES
Angie Dinh has attended a total of 5 physical therapy appointments to date  Patient was last treated on 4/27/18 and has no remaining appointments scheduled  Goals, objective and subjective information unable to be updated at this time  Patient will be discharged from physical therapy secondary to noncompliance with plan of care  [Well Developed] : well developed [Clear to Auscultation] : lungs were clear to auscultation bilaterally [Normal S1, S2] : normal S1 and S2 [No Edema] : there was no peripheral edema [No Skin Lesions] : no skin lesions [Normal Mood] : the mood was normal [de-identified] : Vitals taken by RN and reviewed. Physical exam performed by RN  [No Acute Distress] : no acute distress [Normal Sclera/Conjunctiva] : normal sclera/conjunctiva [Normal Outer Ear/Nose] : the ears and nose were normal in appearance [No JVD] : no jugular venous distention [No Respiratory Distress] : no respiratory distress [Normal Rate] : heart rate was normal  [Regular Rhythm] : with a regular rhythm [Normal Bowel Sounds] : normal bowel sounds [Non Tender] : non-tender [No CVA Tenderness] : no ~M costovertebral angle tenderness [Normal Gait] : normal gait [No Rash] : no rash [Oriented x3] : oriented to person, place, and time [de-identified] : Right arm AVF

## 2023-08-24 NOTE — ASSESSMENT & PLAN NOTE
· BMI went down from 56.6 to 56.56. He lost 1 kg since last visit  · Patient states that he has been skipping meals, yesterday only had a TV dinner. 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 Patient was educated about exercises to do at home and healthier options for snacks and meals as well as multiple small meals throughout the day.    · Follow up in 2 weeks

## 2023-08-24 NOTE — PROGRESS NOTES
Name: Lisa Barone      : 2000      MRN: [id number]  Encounter Provider: Susan Constantino MD  Encounter Date: 2023   Encounter department: [address] [facility],[address]     1. Morbid obesity with BMI of 45.0-49.9, adult ([facility])  Assessment & Plan:  · BMI went down from 56.6 to 56.56. He lost 1 kg since last visit  · Patient states that he has been skipping meals, yesterday only had a TV dinner. 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 Patient was educated about exercises to do at home and healthier options for snacks and meals as well as multiple small meals throughout the day. · Follow up in 2 weeks            2. Primary hypertension  -     hydrochlorothiazide (HYDRODIURIL) 25 mg tablet; Take 1 tablet (25 mg total) by mouth daily    3. Seasonal allergic rhinitis due to other allergic trigger  -     olopatadine HCl (PATADAY) 0.2 % opth drops; Administer 1 drop to both eyes in the morning         Subjective      Jessieion is a 20 y.o male with PMH of prediabetes, asthma, hypertension, ADHD, and obesity who presents today for management of his weight. Review of Systems   Constitutional: Negative for chills and fever. HENT: Negative for ear pain and sore throat. Eyes: Negative for pain and visual disturbance. Respiratory: Negative for cough and shortness of breath. Cardiovascular: Negative for chest pain and palpitations. Gastrointestinal: Negative for abdominal pain and vomiting. Genitourinary: Negative for dysuria and hematuria. Musculoskeletal: Negative for arthralgias and back pain. Skin: Negative for color change and rash.    Neurological: Negative for seizures and syncope. All other systems reviewed and are negative. Current Outpatient Medications on File Prior to Visit   Medication Sig   • amLODIPine (NORVASC) 10 mg tablet Take 1 tablet (10 mg total) by mouth daily   • Amphetamine-Dextroamphetamine (ADDERALL PO) Take 25 mg by mouth daily. • aspirin-acetaminophen-caffeine (EXCEDRIN MIGRAINE) 250-250-65 MG per tablet Take by mouth   • cetirizine (ZyrTEC) 10 mg tablet    • Cetirizine HCl (ZyrTEC Allergy) 10 MG CAPS Take 1 capsule (10 mg total) by mouth daily   • Cholecalciferol (VITAMIN D-3 PO) Take 5,000 Units by mouth daily. • Cholecalciferol (VITAMIN D3) 2000 units capsule TAKE 1 CAPSULE BY MOUTH ONCE DAILY   • cloNIDine (CATAPRES) 0.3 mg tablet Take 1 tablet (0.3 mg total) by mouth daily   • DEEP SEA NASAL SPRAY 0.65 % nasal spray 1 spray into each nostril 2 (two) times a day as needed for congestion   • dicyclomine (BENTYL) 20 mg tablet Take 1 tablet (20 mg total) by mouth 3 (three) times a day as needed (abdominal pain and diarrhea)   • docusate sodium (COLACE) 250 MG capsule Take 1 capsule (250 mg total) by mouth daily   • fluticasone (FLONASE) 50 mcg/act nasal spray 1 spray into each nostril daily   • Fluticasone-Salmeterol (Advair) 250-50 mcg/dose inhaler Inhale 1 puff 2 (two) times a day Rinse mouth after use.    • ketotifen (ZADITOR) 0.025 % ophthalmic solution Administer 1 drop to the right eye 2 (two) times a day   • LIQUITEARS 1.4 % ophthalmic solution Administer 1 drop to both eyes as needed for dry eyes   • magnesium citrate (CITROMA) 1.745 g/30 mL oral solution Take 296 mL by mouth once for 1 dose   • montelukast (SINGULAIR) 10 mg tablet Take 1 tablet (10 mg total) by mouth daily   • ranitidine (ZANTAC) 150 MG capsule Take 1 capsule (150 mg total) by mouth daily   • [DISCONTINUED] hydrochlorothiazide (HYDRODIURIL) 25 mg tablet Take 1 tablet (25 mg total) by mouth daily   • [DISCONTINUED] olopatadine HCl (PATADAY) 0.2 % opth drops        Objective /80 (BP Location: Right arm, Patient Position: Sitting, Cuff Size: Large)   Pulse (!) 119   Temp 98.2 °F (36.8 °C) (Temporal)   Resp 16   Ht 6' (1.829 m)   Wt (!) 189 kg (417 lb)   SpO2 99%   BMI 56.56 kg/m²     Physical Exam  Constitutional:       General: He is not in acute distress. Appearance: Normal appearance. He is obese. He is not toxic-appearing. HENT:      Head: Normocephalic. Right Ear: External ear normal.      Left Ear: External ear normal.   Eyes:      General: No scleral icterus. Conjunctiva/sclera: Conjunctivae normal.   Cardiovascular:      Rate and Rhythm: Normal rate and regular rhythm. Pulses: Normal pulses. Heart sounds: No murmur heard. No friction rub. No gallop. Pulmonary:      Effort: Pulmonary effort is normal. No respiratory distress. Breath sounds: Normal breath sounds. No wheezing, rhonchi or rales. Musculoskeletal:      Cervical back: Normal range of motion. Right lower leg: No edema. Left lower leg: No edema. Skin:     General: Skin is warm and dry. Capillary Refill: Capillary refill takes less than 2 seconds. Neurological:      General: No focal deficit present. Mental Status: He is alert and oriented to person, place, and time.    Psychiatric:         Mood and Affect: Mood normal.         Behavior: Behavior normal.       Kelsi Mckeon MD

## 2023-08-25 ENCOUNTER — OFFICE VISIT (OUTPATIENT)
Dept: FAMILY MEDICINE CLINIC | Facility: CLINIC | Age: 23
End: 2023-08-25

## 2023-08-25 VITALS
TEMPERATURE: 98.2 F | WEIGHT: 315 LBS | HEIGHT: 72 IN | DIASTOLIC BLOOD PRESSURE: 80 MMHG | OXYGEN SATURATION: 99 % | SYSTOLIC BLOOD PRESSURE: 140 MMHG | HEART RATE: 119 BPM | BODY MASS INDEX: 42.66 KG/M2 | RESPIRATION RATE: 16 BRPM

## 2023-08-25 DIAGNOSIS — E66.01 MORBID OBESITY WITH BMI OF 45.0-49.9, ADULT (HCC): Primary | ICD-10-CM

## 2023-08-25 DIAGNOSIS — I10 PRIMARY HYPERTENSION: ICD-10-CM

## 2023-08-25 DIAGNOSIS — J30.89 SEASONAL ALLERGIC RHINITIS DUE TO OTHER ALLERGIC TRIGGER: ICD-10-CM

## 2023-08-25 PROCEDURE — 99213 OFFICE O/P EST LOW 20 MIN: CPT | Performed by: FAMILY MEDICINE

## 2023-08-25 RX ORDER — HYDROCHLOROTHIAZIDE 25 MG/1
25 TABLET ORAL DAILY
Qty: 60 TABLET | Refills: 1 | Status: SHIPPED | OUTPATIENT
Start: 2023-08-25

## 2023-08-25 RX ORDER — OLOPATADINE HYDROCHLORIDE 2 MG/ML
1 SOLUTION/ DROPS OPHTHALMIC DAILY
Qty: 2.5 ML | Refills: 1 | Status: SHIPPED | OUTPATIENT
Start: 2023-08-25

## 2023-12-08 ENCOUNTER — PATIENT OUTREACH (OUTPATIENT)
Dept: BARIATRICS | Facility: CLINIC | Age: 23
End: 2023-12-08

## 2023-12-08 NOTE — PROGRESS NOTES
Attempted to complete pre-visit screening call. Call unable to be completed as dialed. No option for voicemail at this time.

## 2023-12-10 DIAGNOSIS — I10 PRIMARY HYPERTENSION: ICD-10-CM

## 2023-12-11 RX ORDER — HYDROCHLOROTHIAZIDE 25 MG/1
25 TABLET ORAL DAILY
Qty: 60 TABLET | Refills: 1 | Status: SHIPPED | OUTPATIENT
Start: 2023-12-11

## 2023-12-18 ENCOUNTER — TELEPHONE (OUTPATIENT)
Dept: BARIATRICS | Facility: CLINIC | Age: 23
End: 2023-12-18

## 2023-12-29 DIAGNOSIS — J30.89 SEASONAL ALLERGIC RHINITIS DUE TO OTHER ALLERGIC TRIGGER: ICD-10-CM

## 2023-12-30 RX ORDER — MONTELUKAST SODIUM 10 MG/1
10 TABLET ORAL DAILY
Qty: 90 TABLET | Refills: 1 | Status: SHIPPED | OUTPATIENT
Start: 2023-12-30

## 2024-06-03 ENCOUNTER — OFFICE VISIT (OUTPATIENT)
Dept: FAMILY MEDICINE CLINIC | Facility: CLINIC | Age: 24
End: 2024-06-03
Payer: COMMERCIAL

## 2024-06-03 VITALS
BODY MASS INDEX: 39.17 KG/M2 | TEMPERATURE: 97.2 F | HEART RATE: 93 BPM | WEIGHT: 315 LBS | DIASTOLIC BLOOD PRESSURE: 110 MMHG | SYSTOLIC BLOOD PRESSURE: 148 MMHG | OXYGEN SATURATION: 98 % | HEIGHT: 75 IN

## 2024-06-03 DIAGNOSIS — K29.50 CHRONIC GASTRITIS WITHOUT BLEEDING, UNSPECIFIED GASTRITIS TYPE: ICD-10-CM

## 2024-06-03 DIAGNOSIS — E88.819 INSULIN RESISTANCE: ICD-10-CM

## 2024-06-03 DIAGNOSIS — R73.03 PREDIABETES: ICD-10-CM

## 2024-06-03 DIAGNOSIS — E55.9 VITAMIN D DEFICIENCY: ICD-10-CM

## 2024-06-03 DIAGNOSIS — F90.1 ADHD (ATTENTION DEFICIT HYPERACTIVITY DISORDER), PREDOMINANTLY HYPERACTIVE IMPULSIVE TYPE: ICD-10-CM

## 2024-06-03 DIAGNOSIS — I10 PRIMARY HYPERTENSION: Primary | ICD-10-CM

## 2024-06-03 DIAGNOSIS — J45.20 MILD INTERMITTENT ASTHMA WITHOUT COMPLICATION: ICD-10-CM

## 2024-06-03 DIAGNOSIS — E66.01 MORBID OBESITY WITH BMI OF 45.0-49.9, ADULT (HCC): ICD-10-CM

## 2024-06-03 DIAGNOSIS — K58.0 IRRITABLE BOWEL SYNDROME WITH DIARRHEA: ICD-10-CM

## 2024-06-03 DIAGNOSIS — J30.89 SEASONAL ALLERGIC RHINITIS DUE TO OTHER ALLERGIC TRIGGER: ICD-10-CM

## 2024-06-03 DIAGNOSIS — H10.13 ALLERGIC CONJUNCTIVITIS OF BOTH EYES: Primary | ICD-10-CM

## 2024-06-03 DIAGNOSIS — I10 PRIMARY HYPERTENSION: ICD-10-CM

## 2024-06-03 DIAGNOSIS — K21.9 GASTROESOPHAGEAL REFLUX DISEASE, UNSPECIFIED WHETHER ESOPHAGITIS PRESENT: ICD-10-CM

## 2024-06-03 DIAGNOSIS — G47.09 OTHER INSOMNIA: ICD-10-CM

## 2024-06-03 PROCEDURE — 99215 OFFICE O/P EST HI 40 MIN: CPT | Performed by: PHYSICIAN ASSISTANT

## 2024-06-03 RX ORDER — MONTELUKAST SODIUM 10 MG/1
10 TABLET ORAL DAILY
Qty: 90 TABLET | Refills: 1 | Status: SHIPPED | OUTPATIENT
Start: 2024-06-03

## 2024-06-03 RX ORDER — KETOTIFEN FUMARATE 0.35 MG/ML
1 SOLUTION/ DROPS OPHTHALMIC 2 TIMES DAILY
Qty: 5 ML | Refills: 2 | Status: SHIPPED | OUTPATIENT
Start: 2024-06-03

## 2024-06-03 RX ORDER — CLONIDINE HYDROCHLORIDE 0.3 MG/1
0.3 TABLET ORAL DAILY
Qty: 90 TABLET | Refills: 0 | Status: SHIPPED | OUTPATIENT
Start: 2024-06-03

## 2024-06-03 RX ORDER — FAMOTIDINE 20 MG/1
20 TABLET, FILM COATED ORAL DAILY
Qty: 90 TABLET | Refills: 1 | Status: SHIPPED | OUTPATIENT
Start: 2024-06-03

## 2024-06-03 RX ORDER — FLUTICASONE PROPIONATE 50 MCG
1 SPRAY, SUSPENSION (ML) NASAL DAILY
Qty: 16 G | Refills: 3 | Status: SHIPPED | OUTPATIENT
Start: 2024-06-03

## 2024-06-03 RX ORDER — HYDROCHLOROTHIAZIDE 25 MG/1
25 TABLET ORAL DAILY
Qty: 60 TABLET | Refills: 1 | Status: SHIPPED | OUTPATIENT
Start: 2024-06-03

## 2024-06-03 RX ORDER — AMLODIPINE BESYLATE 10 MG/1
10 TABLET ORAL DAILY
Qty: 60 TABLET | Refills: 1 | Status: SHIPPED | OUTPATIENT
Start: 2024-06-03

## 2024-06-03 RX ORDER — DICYCLOMINE HCL 20 MG
20 TABLET ORAL 3 TIMES DAILY PRN
Qty: 90 TABLET | Refills: 0 | Status: SHIPPED | OUTPATIENT
Start: 2024-06-03

## 2024-06-03 NOTE — PROGRESS NOTES
Ambulatory Visit  Name: Christal Mendenhall      : 2000      MRN: [id number]  Encounter Provider: Kim Farias PA-C  Encounter Date: 6/3/2024   Encounter department: [facility]    Assessment & Plan   1. Allergic conjunctivitis of both eyes  -     Ketotifen Fumarate (ZADITOR) 0.035 % ophthalmic solution; Administer 1 drop to both eyes 2 (two) times a day  2. Primary hypertension  Assessment & Plan:  - Blood pressure is uncontrolled at this time since he is with his medication since 2023  - Restart amlodipine 10 mg daily  - Restart hydrochlorothiazide 25 mg daily  - I did recommend he follow-up in 3 weeks to recheck blood pressure while taking the medication.  -He is aware of the complications of uncontrolled hypertension including stroke, heart attack, kidney failure  - Encouraged to do blood work prior to his next visit  Orders:  -     amLODIPine (NORVASC) 10 mg tablet; Take 1 tablet (10 mg total) by mouth daily  -     hydroCHLOROthiazide 25 mg tablet; Take 1 tablet (25 mg total) by mouth daily  3. Irritable bowel syndrome with diarrhea  Assessment & Plan:  - He does utilize the dicyclomine 20 mg 3 times daily as needed which has been effective.  Orders:  -     dicyclomine (BENTYL) 20 mg tablet; Take 1 tablet (20 mg total) by mouth 3 (three) times a day as needed (abdominal pain and diarrhea)  4. Seasonal allergic rhinitis due to other allergic trigger  Assessment & Plan:  - he states his symptoms are well-controlled with cetirizine 10 mg daily and fluticasone nasal spray daily  Orders:  -     fluticasone (FLONASE) 50 mcg/act nasal spray; 1 spray into each nostril daily  -     montelukast (SINGULAIR) 10 mg tablet; Take 1 tablet (10 mg total) by mouth daily  5. Mild intermittent asthma without complication  Assessment & Plan:  - He states his Advair has been under good control with the Advair discus 250/50 and montelukast 10 mg daily.  He does take 1 puff  twice daily.  - he does not have a rescue inhaler at home.  I did offer to prescribe 1 but he states he has not needed one in quite a while.  And prefers not to have a prescription sent at this time.  6. ADHD (attention deficit hyperactivity disorder), predominantly hyperactive impulsive type  Assessment & Plan:  - he states he was seen psychiatry in the past.  He prefers to hold at this time.  I did advise him he could take up to 6 months to get an appointment with [facility] psychiatry.  He will let me know when he is ready to put the referral in the system.  He has been advised he could also check with his insurance for psychiatry providers covered under his insurance  7. Chronic gastritis without bleeding, unspecified gastritis type  Assessment & Plan:  - He states he has been having upper abdominal pain and points to the epigastric area for quite some time.  He states that his last episode was about 2 weeks ago.  In the past he was on ranitidine 150 mg daily which was effective.  This medication is no longer available.  - I did send a prescription for famotidine 20 mg daily on an empty stomach  - He is aware to avoid any spicy, citrus, acidic foods and caffeine.  He does not take any NSAIDs.  - Refer to [facility] gastroenterology for further evaluation and probable EGD  8. Gastroesophageal reflux disease, unspecified whether esophagitis present  Assessment & Plan:  - He states he has been having upper abdominal pain and points to the epigastric area for quite some time.  He states that his last episode was about 2 weeks ago.  In the past he was on ranitidine 150 mg daily which was effective.  This medication is no longer available.  - I did send a prescription for famotidine 20 mg daily on an empty stomach  - He is aware to avoid any spicy, citrus, acidic foods and caffeine.  He does not take any NSAIDs.  - Refer to [facility] gastroenterology for further evaluation and probable EGD  Orders:  -     famotidine  (PEPCID) 20 mg tablet; Take 1 tablet (20 mg total) by mouth daily  -     Ambulatory Referral to Gastroenterology; Future  9. Vitamin D deficiency  -     Cholecalciferol (Vitamin D-3) 125 MCG (5000 UT) TABS; Take 5,000 Units by mouth daily  10. Other insomnia  Assessment & Plan:  - He states in the past that he was on clonidine 0.3 mg at bedtime for insomnia which was very effective.  He would like to restart this medication.  I did send a prescription to the pharmacy.  -He states he was tested for sleep apnea years ago which came back negative.  I was able to review his sleep study report from 2017 which did reveal sleep maintenance insomnia and snoring.  Orders:  -     cloNIDine (CATAPRES) 0.3 mg tablet; Take 1 tablet (0.3 mg total) by mouth daily  11. Prediabetes  Assessment & Plan:  - Encouraged to do blood work to monitor his blood sugar and A1c prior to his next appointment with me in 3 weeks  -A1c result from July 2023 is 5.7    -He will follow-up in 3 weeks for a long visit recheck blood pressure and review lab results and address further concerns.    [employer] software was used to dictate this note. It may contain errors with dictating incorrect words/spelling. Please contact provider directly for any questions.        History of Present Illness     Patient presents today to establish care.  He states has been out of his blood pressure medications amlodipine and HCTZ since November 2023.  He does not monitor his blood pressure at home.  He denies any chest pain, shortness of breath or swelling of his lower extremities.    He has had recurrent epigastric pain.  He states when he was on the ranitidine 150 mg daily it did alleviate his pain.  He states it depends on the foods that he eats.  He states his last episode was about 2 weeks ago.  He does have intermittent vomiting associated with his pain.  He denies any nighttime burning in his chest.  He states it has been years since he has had an endoscopy.    He  states his asthma is under good control with Advair.  He does not ever need a rescue inhaler.  He prefers not to have a prescription for 1.  He does need refills of his Singulair, Flonase and Zaditor for his seasonal allergies.    Abdominal Pain  This is a new problem. The current episode started more than 1 year ago. The onset quality is sudden. The problem occurs intermittently. The most recent episode lasted 2 hours. The problem has been unchanged. The pain is located in the epigastric region. The pain is at a severity of 6/10. The quality of the pain is dull. The abdominal pain does not radiate. Associated symptoms include myalgias and vomiting. Pertinent negatives include no anorexia, arthralgias, belching, constipation, diarrhea, dysuria, fever, flatus, frequency, headaches, hematochezia, hematuria, melena, nausea or weight loss. The pain is aggravated by certain positions. The pain is relieved by Liquids, sitting up and vomiting.       Review of Systems   Constitutional:  Negative for chills, fever and weight loss.   Respiratory:  Negative for cough, shortness of breath and wheezing.    Cardiovascular:  Negative for chest pain and leg swelling.   Gastrointestinal:  Positive for abdominal pain and vomiting. Negative for anorexia, constipation, diarrhea, flatus, hematochezia, melena and nausea.   Genitourinary:  Negative for dysuria, frequency and hematuria.   Musculoskeletal:  Positive for myalgias. Negative for arthralgias.   Neurological:  Negative for headaches.   Psychiatric/Behavioral:          He did request a prescription for the clonidine for insomnia which has been effective in the past.     Medical History Reviewed by provider this encounter:       Current Outpatient Medications on File Prior to Visit   Medication Sig Dispense Refill    aspirin-acetaminophen-caffeine (EXCEDRIN MIGRAINE) 250-250-65 MG per tablet Take by mouth      cetirizine (ZyrTEC) 10 mg tablet       DEEP SEA NASAL SPRAY 0.65 %  "nasal spray 1 spray into each nostril 2 (two) times a day as needed for congestion 30 mL 3    docusate sodium (COLACE) 250 MG capsule Take 1 capsule (250 mg total) by mouth daily 10 capsule 0    Fluticasone-Salmeterol (Advair) 250-50 mcg/dose inhaler Inhale 1 puff 2 (two) times a day Rinse mouth after use. 60 blister 1    LIQUITEARS 1.4 % ophthalmic solution Administer 1 drop to both eyes as needed for dry eyes 15 mL 3    Amphetamine-Dextroamphetamine (ADDERALL PO) Take 25 mg by mouth daily. (Patient not taking: Reported on 6/3/2024)       No current facility-administered medications on file prior to visit.      Objective     BP (!) 148/110 (BP Location: Right arm, Patient Position: Sitting, Cuff Size: Large)   Pulse 93   Temp (!) 97.2 °F (36.2 °C) (Temporal)   Ht 6' 3\" (1.905 m)   Wt (!) 174 kg (383 lb)   SpO2 98%   BMI 47.87 kg/m²     Physical Exam  Vitals reviewed.   Constitutional:       General: He is not in acute distress.     Appearance: Normal appearance. He is well-developed. He is not ill-appearing, toxic-appearing or diaphoretic.   HENT:      Head: Normocephalic and atraumatic.   Neck:      Thyroid: No thyromegaly.   Cardiovascular:      Rate and Rhythm: Normal rate and regular rhythm.      Heart sounds: Normal heart sounds. No murmur heard.  Pulmonary:      Effort: Pulmonary effort is normal. No respiratory distress.      Breath sounds: Normal breath sounds. No wheezing, rhonchi or rales.   Abdominal:      General: Bowel sounds are normal.      Palpations: Abdomen is soft. There is no mass.      Tenderness: There is abdominal tenderness in the epigastric area.   Musculoskeletal:         General: No deformity.      Cervical back: Neck supple.      Right lower leg: No edema.      Left lower leg: No edema.   Lymphadenopathy:      Cervical: No cervical adenopathy.   Skin:     General: Skin is warm.   Neurological:      General: No focal deficit present.      Mental Status: He is alert.   Psychiatric:   "       Mood and Affect: Mood normal.         Behavior: Behavior normal.         Thought Content: Thought content normal.         Judgment: Judgment normal.       Administrative Statements   I have spent a total time of 50 minutes on 06/04/24 In caring for this patient including Diagnostic results, Prognosis, Risks and benefits of tx options, Instructions for management, Patient and family education, Importance of tx compliance, Risk factor reductions, Impressions, Counseling / Coordination of care, Documenting in the medical record, Reviewing / ordering tests, medicine, procedures  , and Obtaining or reviewing history  .

## 2024-06-04 NOTE — ASSESSMENT & PLAN NOTE
- Encouraged to do blood work to monitor his blood sugar and A1c prior to his next appointment with me in 3 weeks  -A1c result from July 2023 is 5.7

## 2024-06-04 NOTE — ASSESSMENT & PLAN NOTE
- He states in the past that he was on clonidine 0.3 mg at bedtime for insomnia which was very effective.  He would like to restart this medication.  I did send a prescription to the pharmacy.  -He states he was tested for sleep apnea years ago which came back negative.  I was able to review his sleep study report from 2017 which did reveal sleep maintenance insomnia and snoring.

## 2024-06-04 NOTE — ASSESSMENT & PLAN NOTE
- he states his symptoms are well-controlled with cetirizine 10 mg daily and fluticasone nasal spray daily

## 2024-06-04 NOTE — ASSESSMENT & PLAN NOTE
- He states his Advair has been under good control with the Advair discus 250/50 and montelukast 10 mg daily.  He does take 1 puff twice daily.  - he does not have a rescue inhaler at home.  I did offer to prescribe 1 but he states he has not needed one in quite a while.  And prefers not to have a prescription sent at this time.   I will SWITCH the dose or number of times a day I take the medications listed below when I get home from the hospital:  None

## 2024-06-04 NOTE — ASSESSMENT & PLAN NOTE
- He states he has been having upper abdominal pain and points to the epigastric area for quite some time.  He states that his last episode was about 2 weeks ago.  In the past he was on ranitidine 150 mg daily which was effective.  This medication is no longer available.  - I did send a prescription for famotidine 20 mg daily on an empty stomach  - He is aware to avoid any spicy, citrus, acidic foods and caffeine.  He does not take any NSAIDs.  - Refer to [facility] gastroenterology for further evaluation and probable EGD

## 2024-06-04 NOTE — ASSESSMENT & PLAN NOTE
- He states he has been having upper abdominal pain and points to the epigastric area for quite some time.  He states that his last episode was about 2 weeks ago.  In the past he was on ranitidine 150 mg daily which was effective.  This medication is no longer available.  - I did send a prescription for famotidine 20 mg daily on an empty stomach  - He is aware to avoid any spicy, citrus, acidic foods and caffeine.  He does not take any NSAIDs.  - Refer to [facility] gastroenterology for further evaluation and probable EGD

## 2024-06-04 NOTE — ASSESSMENT & PLAN NOTE
- he states he was seen psychiatry in the past.  He prefers to hold at this time.  I did advise him he could take up to 6 months to get an appointment with [facility] psychiatry.  He will let me know when he is ready to put the referral in the system.  He has been advised he could also check with his insurance for psychiatry providers covered under his insurance

## 2024-06-04 NOTE — ASSESSMENT & PLAN NOTE
- Blood pressure is uncontrolled at this time since he is with his medication since November 2023  - Restart amlodipine 10 mg daily  - Restart hydrochlorothiazide 25 mg daily  - I did recommend he follow-up in 3 weeks to recheck blood pressure while taking the medication.  -He is aware of the complications of uncontrolled hypertension including stroke, heart attack, kidney failure  - Encouraged to do blood work prior to his next visit

## 2024-06-12 ENCOUNTER — TELEPHONE (OUTPATIENT)
Dept: FAMILY MEDICINE CLINIC | Facility: CLINIC | Age: 24
End: 2024-06-12

## 2024-06-12 ENCOUNTER — APPOINTMENT (OUTPATIENT)
Dept: LAB | Facility: HOSPITAL | Age: 24
End: 2024-06-12
Payer: COMMERCIAL

## 2024-06-12 DIAGNOSIS — I10 PRIMARY HYPERTENSION: ICD-10-CM

## 2024-06-12 DIAGNOSIS — K29.50 CHRONIC GASTRITIS WITHOUT BLEEDING, UNSPECIFIED GASTRITIS TYPE: ICD-10-CM

## 2024-06-12 DIAGNOSIS — E66.01 MORBID OBESITY WITH BMI OF 45.0-49.9, ADULT (HCC): ICD-10-CM

## 2024-06-12 DIAGNOSIS — R73.03 PREDIABETES: ICD-10-CM

## 2024-06-12 DIAGNOSIS — K21.9 GASTROESOPHAGEAL REFLUX DISEASE, UNSPECIFIED WHETHER ESOPHAGITIS PRESENT: ICD-10-CM

## 2024-06-12 DIAGNOSIS — E88.819 INSULIN RESISTANCE: ICD-10-CM

## 2024-06-12 LAB
ALBUMIN SERPL BCP-MCNC: 4.9 G/DL (ref 3.5–5)
ALP SERPL-CCNC: 62 U/L (ref 34–104)
ALT SERPL W P-5'-P-CCNC: 24 U/L (ref 7–52)
ANION GAP SERPL CALCULATED.3IONS-SCNC: 11 MMOL/L (ref 4–13)
AST SERPL W P-5'-P-CCNC: 24 U/L (ref 13–39)
BASOPHILS # BLD AUTO: 0.05 THOUSANDS/ÂΜL (ref 0–0.1)
BASOPHILS NFR BLD AUTO: 1 % (ref 0–1)
BILIRUB SERPL-MCNC: 0.33 MG/DL (ref 0.2–1)
BUN SERPL-MCNC: 7 MG/DL (ref 5–25)
CALCIUM SERPL-MCNC: 10.6 MG/DL (ref 8.4–10.2)
CHLORIDE SERPL-SCNC: 98 MMOL/L (ref 96–108)
CHOLEST SERPL-MCNC: 146 MG/DL
CO2 SERPL-SCNC: 29 MMOL/L (ref 21–32)
CREAT SERPL-MCNC: 0.89 MG/DL (ref 0.6–1.3)
CREAT UR-MCNC: 197.3 MG/DL
EOSINOPHIL # BLD AUTO: 0.1 THOUSAND/ÂΜL (ref 0–0.61)
EOSINOPHIL NFR BLD AUTO: 1 % (ref 0–6)
ERYTHROCYTE [DISTWIDTH] IN BLOOD BY AUTOMATED COUNT: 12.8 % (ref 11.6–15.1)
EST. AVERAGE GLUCOSE BLD GHB EST-MCNC: 103 MG/DL
GFR SERPL CREATININE-BSD FRML MDRD: 118 ML/MIN/1.73SQ M
GLUCOSE P FAST SERPL-MCNC: 94 MG/DL (ref 65–99)
HBA1C MFR BLD: 5.2 %
HCT VFR BLD AUTO: 46.2 % (ref 36.5–49.3)
HDLC SERPL-MCNC: 46 MG/DL
HGB BLD-MCNC: 15.4 G/DL (ref 12–17)
IMM GRANULOCYTES # BLD AUTO: 0.02 THOUSAND/UL (ref 0–0.2)
IMM GRANULOCYTES NFR BLD AUTO: 0 % (ref 0–2)
LDLC SERPL CALC-MCNC: 50 MG/DL (ref 0–100)
LYMPHOCYTES # BLD AUTO: 2.75 THOUSANDS/ÂΜL (ref 0.6–4.47)
LYMPHOCYTES NFR BLD AUTO: 32 % (ref 14–44)
MCH RBC QN AUTO: 30 PG (ref 26.8–34.3)
MCHC RBC AUTO-ENTMCNC: 33.3 G/DL (ref 31.4–37.4)
MCV RBC AUTO: 90 FL (ref 82–98)
MICROALBUMIN UR-MCNC: 8.3 MG/L
MICROALBUMIN/CREAT 24H UR: 4 MG/G CREATININE (ref 0–30)
MONOCYTES # BLD AUTO: 0.68 THOUSAND/ÂΜL (ref 0.17–1.22)
MONOCYTES NFR BLD AUTO: 8 % (ref 4–12)
NEUTROPHILS # BLD AUTO: 5.09 THOUSANDS/ÂΜL (ref 1.85–7.62)
NEUTS SEG NFR BLD AUTO: 58 % (ref 43–75)
NONHDLC SERPL-MCNC: 100 MG/DL
NRBC BLD AUTO-RTO: 0 /100 WBCS
PLATELET # BLD AUTO: 290 THOUSANDS/UL (ref 149–390)
PMV BLD AUTO: 12.3 FL (ref 8.9–12.7)
POTASSIUM SERPL-SCNC: 4 MMOL/L (ref 3.5–5.3)
PROT SERPL-MCNC: 8.3 G/DL (ref 6.4–8.4)
RBC # BLD AUTO: 5.14 MILLION/UL (ref 3.88–5.62)
SODIUM SERPL-SCNC: 138 MMOL/L (ref 135–147)
T4 FREE SERPL-MCNC: 0.76 NG/DL (ref 0.61–1.12)
TRIGL SERPL-MCNC: 252 MG/DL
TSH SERPL DL<=0.05 MIU/L-ACNC: 4.82 UIU/ML (ref 0.45–4.5)
WBC # BLD AUTO: 8.69 THOUSAND/UL (ref 4.31–10.16)

## 2024-06-12 PROCEDURE — 84439 ASSAY OF FREE THYROXINE: CPT | Performed by: PHYSICIAN ASSISTANT

## 2024-06-12 PROCEDURE — 84443 ASSAY THYROID STIM HORMONE: CPT | Performed by: PHYSICIAN ASSISTANT

## 2024-06-12 PROCEDURE — 36415 COLL VENOUS BLD VENIPUNCTURE: CPT | Performed by: PHYSICIAN ASSISTANT

## 2024-06-12 PROCEDURE — 85025 COMPLETE CBC W/AUTO DIFF WBC: CPT | Performed by: PHYSICIAN ASSISTANT

## 2024-06-12 PROCEDURE — 83036 HEMOGLOBIN GLYCOSYLATED A1C: CPT | Performed by: PHYSICIAN ASSISTANT

## 2024-06-12 PROCEDURE — 82043 UR ALBUMIN QUANTITATIVE: CPT

## 2024-06-12 PROCEDURE — 80061 LIPID PANEL: CPT | Performed by: PHYSICIAN ASSISTANT

## 2024-06-12 PROCEDURE — 80053 COMPREHEN METABOLIC PANEL: CPT | Performed by: PHYSICIAN ASSISTANT

## 2024-06-12 PROCEDURE — 82570 ASSAY OF URINE CREATININE: CPT

## 2024-07-02 ENCOUNTER — OFFICE VISIT (OUTPATIENT)
Dept: FAMILY MEDICINE CLINIC | Facility: CLINIC | Age: 24
End: 2024-07-02
Payer: COMMERCIAL

## 2024-07-02 VITALS
BODY MASS INDEX: 39.17 KG/M2 | DIASTOLIC BLOOD PRESSURE: 84 MMHG | SYSTOLIC BLOOD PRESSURE: 120 MMHG | HEIGHT: 75 IN | TEMPERATURE: 98 F | OXYGEN SATURATION: 97 % | HEART RATE: 97 BPM | WEIGHT: 315 LBS

## 2024-07-02 DIAGNOSIS — I10 PRIMARY HYPERTENSION: ICD-10-CM

## 2024-07-02 DIAGNOSIS — E83.52 HYPERCALCEMIA: ICD-10-CM

## 2024-07-02 DIAGNOSIS — R79.89 ELEVATED TSH: ICD-10-CM

## 2024-07-02 DIAGNOSIS — E78.1 HYPERTRIGLYCERIDEMIA: ICD-10-CM

## 2024-07-02 DIAGNOSIS — E55.9 VITAMIN D DEFICIENCY: ICD-10-CM

## 2024-07-02 DIAGNOSIS — J30.89 SEASONAL ALLERGIC RHINITIS DUE TO OTHER ALLERGIC TRIGGER: Primary | ICD-10-CM

## 2024-07-02 DIAGNOSIS — G47.09 OTHER INSOMNIA: ICD-10-CM

## 2024-07-02 PROCEDURE — 99214 OFFICE O/P EST MOD 30 MIN: CPT | Performed by: PHYSICIAN ASSISTANT

## 2024-07-02 RX ORDER — CETIRIZINE HYDROCHLORIDE 10 MG/1
10 TABLET ORAL DAILY
Qty: 90 TABLET | Refills: 1 | Status: SHIPPED | OUTPATIENT
Start: 2024-07-02

## 2024-07-02 RX ORDER — CLONIDINE HYDROCHLORIDE 0.3 MG/1
0.3 TABLET ORAL DAILY
Qty: 90 TABLET | Refills: 0 | Status: SHIPPED | OUTPATIENT
Start: 2024-07-02

## 2024-07-02 RX ORDER — HYDROCHLOROTHIAZIDE 25 MG/1
25 TABLET ORAL DAILY
Qty: 90 TABLET | Refills: 0 | Status: SHIPPED | OUTPATIENT
Start: 2024-07-02

## 2024-07-02 NOTE — PROGRESS NOTES
Ambulatory Visit  Name: Christal Mendenhall      : 2000      MRN: [id number]  Encounter Provider: Kim Farias PA-C  Encounter Date: 2024   Encounter department: [facility]    Assessment & Plan   1. Seasonal allergic rhinitis due to other allergic trigger  -     cetirizine (ZyrTEC) 10 mg tablet; Take 1 tablet (10 mg total) by mouth daily  -     Comprehensive metabolic panel  -     Lipid panel  -     TSH, 3rd generation with Free T4 reflex  2. Vitamin D deficiency  -     Cholecalciferol (Vitamin D-3) 125 MCG (5000 UT) TABS; Take 5,000 Units by mouth daily  -     Comprehensive metabolic panel  -     Lipid panel  -     TSH, 3rd generation with Free T4 reflex  3. Other insomnia  -     cloNIDine (CATAPRES) 0.3 mg tablet; Take 1 tablet (0.3 mg total) by mouth daily  -     Comprehensive metabolic panel  -     Lipid panel  -     TSH, 3rd generation with Free T4 reflex  4. Primary hypertension  -     hydroCHLOROthiazide 25 mg tablet; Take 1 tablet (25 mg total) by mouth daily  -     Comprehensive metabolic panel  -     Lipid panel  -     TSH, 3rd generation with Free T4 reflex  5. Hypertriglyceridemia  -     Comprehensive metabolic panel  -     Lipid panel  -     TSH, 3rd generation with Free T4 reflex  6. Elevated TSH  -     Comprehensive metabolic panel  -     Lipid panel  -     TSH, 3rd generation with Free T4 reflex  7. Hypercalcemia  -     Comprehensive metabolic panel  -     Lipid panel  -     TSH, 3rd generation with Free T4 reflex    -Blood pressures under good control at this time with clonidine and HCTZ  - He did request a prescription for cetirizine for his allergies  - Lab results have been reviewed  - TSH is slightly elevated.  I did recommend repeating this level in 3 months prior to his next visit  - Triglycerides are at 252 previously 104.  I did recommend a low-fat diet and exercise  - Calcium level slightly elevated.  Repeat level in 3 months  - A1c is  "currently at 5.2 previously 5.7.  Continue on a low-carb diet  -Continue vitamin D3 daily  - I did recommend follow-up in 3 months to include his annual physical and proceed with blood work prior to that visit    [employer] software was used to dictate this note. It may contain errors with dictating incorrect words/spelling. Please contact provider directly for any questions.        History of Present Illness     Patient presents today for follow-up of blood pressure after restarting clonidine and HCTZ.  He would also like to review his lab results.        Review of Systems   Constitutional: Negative.        Objective     /84   Pulse 97   Temp 98 °F (36.7 °C)   Ht 6' 3\" (1.905 m)   Wt (!) 176 kg (388 lb)   SpO2 97%   BMI 48.50 kg/m²     Physical Exam  Vitals reviewed.   Constitutional:       General: He is not in acute distress.     Appearance: Normal appearance. He is obese. He is not ill-appearing, toxic-appearing or diaphoretic.   HENT:      Head: Normocephalic and atraumatic.   Musculoskeletal:      Cervical back: Neck supple.   Neurological:      General: No focal deficit present.      Mental Status: He is alert.   Psychiatric:         Mood and Affect: Mood normal.         Behavior: Behavior normal.         Thought Content: Thought content normal.         Judgment: Judgment normal.       Administrative Statements     "

## 2024-07-05 DIAGNOSIS — K58.0 IRRITABLE BOWEL SYNDROME WITH DIARRHEA: ICD-10-CM

## 2024-07-05 RX ORDER — DICYCLOMINE HCL 20 MG
20 TABLET ORAL 3 TIMES DAILY PRN
Qty: 90 TABLET | Refills: 0 | Status: SHIPPED | OUTPATIENT
Start: 2024-07-05

## 2024-09-03 DIAGNOSIS — E55.9 VITAMIN D DEFICIENCY: ICD-10-CM

## 2024-09-03 DIAGNOSIS — J30.89 SEASONAL ALLERGIC RHINITIS DUE TO OTHER ALLERGIC TRIGGER: ICD-10-CM

## 2024-09-03 DIAGNOSIS — H10.13 ALLERGIC CONJUNCTIVITIS OF BOTH EYES: ICD-10-CM

## 2024-09-03 DIAGNOSIS — I10 PRIMARY HYPERTENSION: ICD-10-CM

## 2024-09-03 NOTE — TELEPHONE ENCOUNTER
Patient called, is requesting a script be sent to the following pharmacy:  [facility] - [address]     Medication:     Ketotifen Fumarate (ZADITOR) 0.035 % ophthalmic solution       Dose/Frequency: Administer 1 drop to both eyes 2 (two) times a day     Quantity: 5ml    Ph[facility]: [facility] [address]    Office:    [x] PCP/Provider - Kim Farias PA-C   [] Speciality/Provider -     Does the patient have enough for 3 days?   [x] Yes   [] No - Send as HP to POD    Medication: hydroCHLOROthiazide 25 mg tablet     Dose/Frequency: *per pt request* While incarcerated, the provider changed his dose from 1 daily to **take 2 tablets daily by mouth*.  Pt has been taking 2 daily, has no medication remaining.     Quantity: 90 tablets    Pharmacy: [facility] - [address]    Office:   [x] PCP/Provider - Kim Farias PA-C   [] Speciality/Provider -     Does the patient have enough for 3 days?   [] Yes   [x] No - Send as HP to POD    Medication:     fluticasone (FLONASE) 50 mcg/act nasal spray       Dose/Frequency:     1 spray into each nostril daily       Quantity: 16g    Pharmacy: [facility] [address]     Office:   [x] PCP/Provider - : Kim Farias PA-C   [] Speciality/Provider -     Does the patient have enough for 3 days?   [x] Yes   [] No - Send as HP to POD    Medication: DEEP SEA NASAL SPRAY 0.65 % nasal spray     Dose/Frequency:     1 spray into each nostril 2 (two) times a day as needed for congestion       Quantity: 30ml    [facility] [address]    Office:   [x] PCP/Provider - FABY Boland ?  [] Speciality/Provider -     Does the patient have enough for 3 days?   [x] Yes   [] No - Send as HP to POD      Medication:     Cholecalciferol (Vitamin D-3) 125 MCG (5000 UT) TABS       Dose/Frequency:     Take 5,000 Units by mouth daily        Quantity: 90 tablet    Pharmacy: [facility] - [place] [address] [address]     Office:   [x] PCP/Provider -  Kim Farias PA-C   [] Speciality/Provider -     Does the patient have enough for 3 days?   [x] Yes   [] No - Send as HP to POD    Medication: amLODIPine (NORVASC) 10 mg tablet     Dose/Frequency:     Take 1 tablet (10 mg total) by mouth daily       Quantity: 60 tablets    Pharmacy: [facility] - [place] [address]    Office:   [x] PCP/Provider - Kim Farias PA-C   [] Speciality/Provider -     Does the patient have enough for 3 days?   [x] Yes   [] No - Send as HP to POD

## 2024-09-04 DIAGNOSIS — H10.13 ALLERGIC CONJUNCTIVITIS OF BOTH EYES: ICD-10-CM

## 2024-09-04 RX ORDER — KETOTIFEN FUMARATE 0.35 MG/ML
1 SOLUTION/ DROPS OPHTHALMIC 2 TIMES DAILY
Qty: 5 ML | Refills: 2 | Status: CANCELLED | OUTPATIENT
Start: 2024-09-04

## 2024-09-04 RX ORDER — HYDROCHLOROTHIAZIDE 25 MG/1
25 TABLET ORAL DAILY
Qty: 30 TABLET | Refills: 0 | Status: SHIPPED | OUTPATIENT
Start: 2024-09-04

## 2024-09-04 RX ORDER — AMLODIPINE BESYLATE 10 MG/1
10 TABLET ORAL DAILY
Qty: 90 TABLET | Refills: 1 | Status: SHIPPED | OUTPATIENT
Start: 2024-09-04

## 2024-09-04 RX ORDER — CETIRIZINE HYDROCHLORIDE 10 MG/1
10 TABLET ORAL DAILY
Qty: 90 TABLET | Refills: 1 | OUTPATIENT
Start: 2024-09-04

## 2024-09-04 RX ORDER — FLUTICASONE PROPIONATE 50 MCG
1 SPRAY, SUSPENSION (ML) NASAL DAILY
Qty: 16 G | Refills: 5 | Status: SHIPPED | OUTPATIENT
Start: 2024-09-04

## 2024-09-04 NOTE — TELEPHONE ENCOUNTER
Patient needs updated blood work. Please place orders. A courtesy refill was provided, for Vitamin D and Hydrochlorothiazide

## 2024-09-05 ENCOUNTER — OFFICE VISIT (OUTPATIENT)
Dept: CARDIOLOGY CLINIC | Facility: CLINIC | Age: 24
End: 2024-09-05
Payer: COMMERCIAL

## 2024-09-05 VITALS
DIASTOLIC BLOOD PRESSURE: 70 MMHG | HEART RATE: 77 BPM | BODY MASS INDEX: 46.25 KG/M2 | WEIGHT: 315 LBS | SYSTOLIC BLOOD PRESSURE: 130 MMHG

## 2024-09-05 DIAGNOSIS — R73.03 PREDIABETES: ICD-10-CM

## 2024-09-05 DIAGNOSIS — R07.2 PRECORDIAL CHEST PAIN: Primary | ICD-10-CM

## 2024-09-05 DIAGNOSIS — I10 ESSENTIAL HYPERTENSION: ICD-10-CM

## 2024-09-05 DIAGNOSIS — E66.01 MORBID OBESITY (HCC): ICD-10-CM

## 2024-09-05 PROCEDURE — 99244 OFF/OP CNSLTJ NEW/EST MOD 40: CPT | Performed by: INTERNAL MEDICINE

## 2024-09-05 PROCEDURE — 93000 ELECTROCARDIOGRAM COMPLETE: CPT | Performed by: INTERNAL MEDICINE

## 2024-09-05 RX ORDER — KETOTIFEN FUMARATE 0.35 MG/ML
1 SOLUTION/ DROPS OPHTHALMIC 2 TIMES DAILY
Qty: 5 ML | Refills: 0 | Status: SHIPPED | OUTPATIENT
Start: 2024-09-05

## 2024-09-05 RX ORDER — SODIUM CHLORIDE 0.65 %
1 AEROSOL, SPRAY (ML) NASAL 2 TIMES DAILY PRN
Qty: 30 ML | Refills: 3 | Status: SHIPPED | OUTPATIENT
Start: 2024-09-05

## 2024-09-05 NOTE — PROGRESS NOTES
Cardiology Office Note  MD Rafal aL MD, [facility]  Aristides Trent DO, [facility]  MD Nishant Seymour DO, [facility]  Luis Felipe Pickett DO, [facility]  ----------------------------------------------------------------  [address] [address]    Christal Mendenhall 24 y.o. male MRN: [id number]  Unit/Bed#:  Encounter: [id number]      History of Present Illness:  It was a pleasure to see Christal Mendenhall in the office today for initial CV evaluation. He has a past medical history of hypertension, GERD, nonalcoholic fatty liver disease, prediabetes and obesity.  He had a family history of hypertension.  He establish care with us in September 2024.  The patient has been experiencing progressive hypertension over the course of several years and was uptitrated on antihypertensive medications.  Additionally, he had difficulty with physical activity due to some pressure in the chest and feeling as though he was having some sort of reflux in his throat every time he would exercise.  He also admitted to some mild discomfort in the chest during these episodes.  Due to his elevated blood pressure readings, he was referred to cardiology office in September 2024.    He denies any lower extremity swelling, orthopnea or paroxysmal nocturnal dyspnea.  Denies lightheadedness, dizziness or palpitations.    Review of Systems:  Review of Systems   Constitutional: Negative for decreased appetite, fever, weight gain and weight loss.   HENT:  Negative for congestion and sore throat.    Eyes:  Negative for visual disturbance.   Cardiovascular:  Positive for chest pain. Negative for dyspnea on exertion, leg swelling, near-syncope and palpitations.   Respiratory:  Negative for cough and shortness of breath.    Hematologic/Lymphatic: Negative for bleeding problem.   Skin:  Negative for rash.   Musculoskeletal:  Negative for myalgias and neck pain.   Gastrointestinal:  Negative for  abdominal pain and nausea.   Neurological:  Negative for light-headedness and weakness.   Psychiatric/Behavioral:  Negative for depression.        Past Medical History:   Diagnosis Date    ADHD (attention deficit hyperactivity disorder)     Allergic 3/17/2006    Seasonal allergies and pet fur    Allergy     Asthma     Eating disorder 7/5/2005    Eating too much    GERD (gastroesophageal reflux disease)     Hypertension     IBS (irritable bowel syndrome)     Insulin resistance     Migraine     Morbid obesity (HCC)     Obesity 8/05/2004    Seasonal allergies     Traumatic rupture of radial collateral ligament     resolved 09/26/2016    Umbilical hernia        Past Surgical History:   Procedure Laterality Date    ADENOIDECTOMY      CIRCUMCISION      ESOPHAGOGASTRODUODENOSCOPY      with biopsy    UT EGD TRANSORAL BIOPSY SINGLE/MULTIPLE N/A 6/14/2016    Procedure: ESOPHAGOGASTRODUODENOSCOPY (EGD);  Surgeon: Juan De Souza MD;  Location: [facility];  Service: Pediatric Gastrointestinal    TONSILLECTOMY AND ADENOIDECTOMY      UMBILICAL HERNIA REPAIR         Social History     Socioeconomic History    Marital status: Single     Spouse name: Not on file    Number of children: Not on file    Years of education: Not on file    Highest education level: Not on file   Occupational History    Occupation: employed   Tobacco Use    Smoking status: Former     Types: Cigarettes     Passive exposure: Past    Smokeless tobacco: Never   Vaping Use    Vaping status: Former    Substances: Nicotine, Flavoring   Substance and Sexual Activity    Alcohol use: Yes    Drug use: No    Sexual activity: Not Currently     Partners: Female     Birth control/protection: Condom Male   Other Topics Concern    Not on file   Social History Narrative    Caffeine use    Currently in school    Lives at home with mother and sister , safe at home , no smoking , 2 cats one dog and in 11th grade      Social Determinants of Health     Financial Resource Strain:  Medium Risk (6/30/2023)    Overall Financial Resource Strain (CARDIA)     Difficulty of Paying Living Expenses: Somewhat hard   Food Insecurity: Patient Declined (6/30/2023)    Hunger Vital Sign     Worried About Running Out of Food in the Last Year: Patient declined     Ran Out of Food in the Last Year: Patient declined   Transportation Needs: No Transportation Needs (6/30/2023)    PRAPARE - Transportation     Lack of Transportation (Medical): No     Lack of Transportation (Non-Medical): No   Physical Activity: Not on file   Stress: Not on file   Social Connections: Not on file   Intimate Partner Violence: Not on file   Housing Stability: Not on file       Family History   Problem Relation Age of Onset    Diabetes Mother     Asthma Mother     Hypertension Mother     Glaucoma Mother     Migraines Mother     Other Mother         gastric surg for morbid obesity     ADD / ADHD Mother     No Known Problems Father     Hypertension Sister     Allergies Sister         seasonal    Blindness Maternal Grandfather         due to type 2     Diabetes type II Maternal Grandfather     Diabetes type II Maternal Aunt     Cancer Family         stomach, uteran, breast    Cataracts Family     Cervical cancer Family         primary        Allergies   Allergen Reactions    Dog Epithelium     Other Other (See Comments)     Congestion, itchy eyes    Penicillin V      Reaction Date: 07Jun2011;     Pollen Extract Other (See Comments)     Congestion, itchy eyes    Penicillins Hives, Swelling and Rash         Current Outpatient Medications:     amLODIPine (NORVASC) 10 mg tablet, Take 1 tablet (10 mg total) by mouth daily, Disp: 90 tablet, Rfl: 1    cetirizine (ZyrTEC) 10 mg tablet, Take 1 tablet (10 mg total) by mouth daily, Disp: 90 tablet, Rfl: 1    Cholecalciferol (Vitamin D-3) 125 MCG (5000 UT) TABS, Take 5,000 Units by mouth daily, Disp: 30 tablet, Rfl: 0    cloNIDine (CATAPRES) 0.3 mg tablet, Take 1 tablet (0.3 mg total) by mouth daily,  Disp: 90 tablet, Rfl: 0    Deep Sea Nasal Spray 0.65 % nasal spray, 1 spray into each nostril 2 (two) times a day as needed for congestion, Disp: 30 mL, Rfl: 3    dicyclomine (BENTYL) 20 mg tablet, TAKE 1 TABLET (20 MG TOTAL) BY MOUTH 3 (THREE) TIMES A DAY AS NEEDED (ABDOMINAL PAIN AND DIARRHEA), Disp: 90 tablet, Rfl: 0    famotidine (PEPCID) 20 mg tablet, Take 1 tablet (20 mg total) by mouth daily, Disp: 90 tablet, Rfl: 1    fluticasone (FLONASE) 50 mcg/act nasal spray, 1 spray into each nostril daily, Disp: 16 g, Rfl: 5    Fluticasone-Salmeterol (Advair) 250-50 mcg/dose inhaler, Inhale 1 puff 2 (two) times a day Rinse mouth after use., Disp: 60 blister, Rfl: 1    hydroCHLOROthiazide 25 mg tablet, Take 1 tablet (25 mg total) by mouth daily, Disp: 30 tablet, Rfl: 0    Ketotifen Fumarate (ZADITOR) 0.035 % ophthalmic solution, Administer 1 drop to both eyes 2 (two) times a day, Disp: 5 mL, Rfl: 0    LIQUITEARS 1.4 % ophthalmic solution, Administer 1 drop to both eyes as needed for dry eyes, Disp: 15 mL, Rfl: 3    montelukast (SINGULAIR) 10 mg tablet, Take 1 tablet (10 mg total) by mouth daily, Disp: 90 tablet, Rfl: 1    Amphetamine-Dextroamphetamine (ADDERALL PO), Take 25 mg by mouth daily. (Patient not taking: Reported on 6/3/2024), Disp: , Rfl:     aspirin-acetaminophen-caffeine (EXCEDRIN MIGRAINE) 250-250-65 MG per tablet, Take by mouth (Patient not taking: Reported on 7/2/2024), Disp: , Rfl:     docusate sodium (COLACE) 250 MG capsule, Take 1 capsule (250 mg total) by mouth daily (Patient not taking: Reported on 7/2/2024), Disp: 10 capsule, Rfl: 0    Vitals:    09/05/24 1456   BP: 130/70   BP Location: Left arm   Patient Position: Sitting   Cuff Size: Large   Pulse: 77   Weight: (!) 168 kg (370 lb)     Body mass index is 46.25 kg/m².    PHYSICAL EXAMINATION:  Gen: Awake, Alert, NAD   Head/eyes: AT/NC, pupils equal and round, Anicteric  ENT: mmm  Neck: Supple, No elevated JVP, trachea midline  Resp: CTA bilaterally  no w/r/r  CV: RRR +S1, S2, No m/r/g  Abd: Soft, obese, NT/ND + BS  Ext: no LE edema bilaterally  Neuro: Follows commands, moves all extermities  Psych: Appropriate affect, normal mood, pleasant attitude, non-combative  Skin: warm; no rash, erythema or venous stasis changes on exposed skin    --------------------------------------------------------------------------------  TREADMILL STRESS  No results found for this or any previous visit.     --------------------------------------------------------------------------------  NUCLEAR STRESS TEST: No results found for this or any previous visit.    No results found for this or any previous visit.      --------------------------------------------------------------------------------  CATH:  No results found for this or any previous visit.    --------------------------------------------------------------------------------  ECHO:   No results found for this or any previous visit.    No results found for this or any previous visit.    --------------------------------------------------------------------------------  HOLTER  No results found for this or any previous visit.    No results found for this or any previous visit.    --------------------------------------------------------------------------------  CAROTIDS  No results found for this or any previous visit.     --------------------------------------------------------------------------------  ECGs:  Results for orders placed or performed in visit on 09/05/24   POCT ECG    Impression    Sinus rhythm 77 bpm, normal ECG        Lab Results   Component Value Date    WBC 8.69 06/12/2024    HGB 15.4 06/12/2024    HCT 46.2 06/12/2024    MCV 90 06/12/2024     06/12/2024      Lab Results   Component Value Date    SODIUM 138 06/12/2024    K 4.0 06/12/2024    CL 98 06/12/2024    CO2 29 06/12/2024    BUN 7 06/12/2024    CREATININE 0.89 06/12/2024    CALCIUM 10.6 (H) 06/12/2024      Lab Results   Component Value Date    HGBA1C  "5.2 06/12/2024      Lab Results   Component Value Date    CHOL 143 11/03/2015     Lab Results   Component Value Date    HDL 46 06/12/2024    HDL 45 05/23/2018    HDL 44 06/15/2017     Lab Results   Component Value Date    LDLCALC 50 06/12/2024    LDLCALC 87 05/23/2018    LDLCALC 76 06/15/2017     Lab Results   Component Value Date    TRIG 252 (H) 06/12/2024    TRIG 104 05/23/2018    TRIG 127 06/15/2017     No results found for: \"CHOLHDL\"   No results found for: \"INR\", \"PROTIME\"     1. Precordial chest pain  -     Stress test only, exercise; Future; Expected date: 09/05/2024  2. Essential hypertension  -     POCT ECG  -     Echo complete w/ contrast if indicated; Future; Expected date: 09/05/2024  -     VAS renal artery complete; Future; Expected date: 09/05/2024  -     Comprehensive metabolic panel; Future  -     CBC (Includes Diff/Plt) (Refl); Future  -     Cortisol Level, AM Specimen; Future  -     Aldosterone/Renin Ratio; Future  -     Comprehensive metabolic panel  -     CBC (Includes Diff/Plt) (Refl)  -     Cortisol Level, AM Specimen  -     Ambulatory Referral to Sleep Medicine; Future  3. Prediabetes  4. Morbid obesity (HCC)      IMPRESSION:    Precordial chest pain  Hypertension  Prediabetes  Morbid obesity  Nonalcoholic fatty liver disease  GERD    PLAN:  It was a pleasure to see Christal in the office today for initial CV evaluation.  He is here today due to his personal history of elevated blood pressure readings.  He has had some episodes of chest discomfort with physical activity, but the episodes do not occur all the time.  He does feel as though he gets reflux with physical activity.  He has no signs or symptoms of heart failure and clinically examines to be euvolemic.  Blood pressure and heart rate are currently stable on current antihypertensive regimen.  Last several blood pressure readings demonstrate BP in the 120s to 130s.  He is tolerating his current medications without any reported adverse " "effects.  The patient can perform greater than 4 METS on a daily basis, but if pushing himself hard enough does get some exertional discomfort.  ECG is nonischemic.  Based on his clinical presentation, I have the following recommendations:    1.  Recommend checking 2D echocardiogram to assess cardiac structure and function given his longstanding history of hypertension.  2.  As he has been having some episodes of exertional discomfort in the chest, reasonable to check an exercise stress test to assess for any evidence of underlying myocardial ischemia.  Additionally, we can assess his blood pressure readings at peak exercise.  3.  Would encourage a heart healthy diet low in sodium and carbohydrate.  Recommend salt restriction of less than 1500 mg/day.  4.  Weight loss regimen as tolerated  5.  Continue current antihypertensive regimen including amlodipine, hydrochlorothiazide and clonidine.  6.  We will check blood work including CBC, CMP, cortisol and renin/aldosterone levels.  Should he develop any episodic episodes of hypertension, would consider checking metanephrines.  Will hold off for now.  7.  Would also check a renal artery ultrasound to assess for VIOLA for completeness.  8.  Recommend he take his blood pressure readings regularly 3 times a week to assess his blood pressure control.  9.  We will follow-up with him after testing to review the results.    As always, please do not hesitate to call with any questions.    Portions of the record may have been created with voice recognition software. Occasional wrong word or \"sound a like\" substitutions may have occurred due to the inherent limitations of voice recognition software. Read the chart carefully and recognize, using context, where substitutions have occurred.      Signed: Aristides Trent DO, FACC, FASE, FASNC, FACP  "

## 2024-09-16 ENCOUNTER — APPOINTMENT (OUTPATIENT)
Dept: LAB | Facility: HOSPITAL | Age: 24
End: 2024-09-16
Payer: COMMERCIAL

## 2024-09-16 DIAGNOSIS — I10 ESSENTIAL HYPERTENSION: Primary | ICD-10-CM

## 2024-09-16 LAB
ALBUMIN SERPL BCG-MCNC: 4.4 G/DL (ref 3.5–5)
ALP SERPL-CCNC: 50 U/L (ref 34–104)
ALT SERPL W P-5'-P-CCNC: 13 U/L (ref 7–52)
ANION GAP SERPL CALCULATED.3IONS-SCNC: 11 MMOL/L (ref 4–13)
AST SERPL W P-5'-P-CCNC: 16 U/L (ref 13–39)
BASOPHILS # BLD AUTO: 0.06 THOUSANDS/ΜL (ref 0–0.1)
BASOPHILS NFR BLD AUTO: 1 % (ref 0–1)
BILIRUB SERPL-MCNC: 0.43 MG/DL (ref 0.2–1)
BUN SERPL-MCNC: 8 MG/DL (ref 5–25)
CALCIUM SERPL-MCNC: 9.9 MG/DL (ref 8.4–10.2)
CHLORIDE SERPL-SCNC: 101 MMOL/L (ref 96–108)
CHOLEST SERPL-MCNC: 141 MG/DL
CO2 SERPL-SCNC: 25 MMOL/L (ref 21–32)
CREAT SERPL-MCNC: 0.96 MG/DL (ref 0.6–1.3)
EOSINOPHIL # BLD AUTO: 0.02 THOUSAND/ΜL (ref 0–0.61)
EOSINOPHIL NFR BLD AUTO: 0 % (ref 0–6)
ERYTHROCYTE [DISTWIDTH] IN BLOOD BY AUTOMATED COUNT: 12.9 % (ref 11.6–15.1)
GFR SERPL CREATININE-BSD FRML MDRD: 108 ML/MIN/1.73SQ M
GLUCOSE P FAST SERPL-MCNC: 96 MG/DL (ref 65–99)
HCT VFR BLD AUTO: 41.8 % (ref 36.5–49.3)
HDLC SERPL-MCNC: 44 MG/DL
HGB BLD-MCNC: 14.6 G/DL (ref 12–17)
IMM GRANULOCYTES # BLD AUTO: 0.02 THOUSAND/UL (ref 0–0.2)
IMM GRANULOCYTES NFR BLD AUTO: 0 % (ref 0–2)
LDLC SERPL CALC-MCNC: 71 MG/DL (ref 0–100)
LYMPHOCYTES # BLD AUTO: 2.16 THOUSANDS/ΜL (ref 0.6–4.47)
LYMPHOCYTES NFR BLD AUTO: 26 % (ref 14–44)
MCH RBC QN AUTO: 30.5 PG (ref 26.8–34.3)
MCHC RBC AUTO-ENTMCNC: 34.9 G/DL (ref 31.4–37.4)
MCV RBC AUTO: 87 FL (ref 82–98)
MONOCYTES # BLD AUTO: 0.5 THOUSAND/ΜL (ref 0.17–1.22)
MONOCYTES NFR BLD AUTO: 6 % (ref 4–12)
NEUTROPHILS # BLD AUTO: 5.66 THOUSANDS/ΜL (ref 1.85–7.62)
NEUTS SEG NFR BLD AUTO: 67 % (ref 43–75)
NONHDLC SERPL-MCNC: 97 MG/DL
NRBC BLD AUTO-RTO: 0 /100 WBCS
PLATELET # BLD AUTO: 268 THOUSANDS/UL (ref 149–390)
PMV BLD AUTO: 12.3 FL (ref 8.9–12.7)
POTASSIUM SERPL-SCNC: 3.4 MMOL/L (ref 3.5–5.3)
PROT SERPL-MCNC: 7.4 G/DL (ref 6.4–8.4)
RBC # BLD AUTO: 4.78 MILLION/UL (ref 3.88–5.62)
SODIUM SERPL-SCNC: 137 MMOL/L (ref 135–147)
TRIGL SERPL-MCNC: 132 MG/DL
TSH SERPL DL<=0.05 MIU/L-ACNC: 1.24 UIU/ML (ref 0.45–4.5)
WBC # BLD AUTO: 8.42 THOUSAND/UL (ref 4.31–10.16)

## 2024-09-16 PROCEDURE — 84244 ASSAY OF RENIN: CPT

## 2024-09-16 PROCEDURE — 85025 COMPLETE CBC W/AUTO DIFF WBC: CPT

## 2024-09-16 PROCEDURE — 82088 ASSAY OF ALDOSTERONE: CPT

## 2024-09-17 ENCOUNTER — TELEPHONE (OUTPATIENT)
Dept: FAMILY MEDICINE CLINIC | Facility: CLINIC | Age: 24
End: 2024-09-17

## 2024-09-17 ENCOUNTER — OFFICE VISIT (OUTPATIENT)
Dept: FAMILY MEDICINE CLINIC | Facility: CLINIC | Age: 24
End: 2024-09-17
Payer: COMMERCIAL

## 2024-09-17 VITALS
RESPIRATION RATE: 18 BRPM | HEART RATE: 90 BPM | DIASTOLIC BLOOD PRESSURE: 86 MMHG | WEIGHT: 315 LBS | HEIGHT: 75 IN | OXYGEN SATURATION: 97 % | SYSTOLIC BLOOD PRESSURE: 126 MMHG | TEMPERATURE: 98 F | BODY MASS INDEX: 39.17 KG/M2

## 2024-09-17 DIAGNOSIS — G47.09 OTHER INSOMNIA: ICD-10-CM

## 2024-09-17 DIAGNOSIS — R73.03 PREDIABETES: ICD-10-CM

## 2024-09-17 DIAGNOSIS — J30.89 SEASONAL ALLERGIC RHINITIS DUE TO OTHER ALLERGIC TRIGGER: ICD-10-CM

## 2024-09-17 DIAGNOSIS — E87.6 HYPOKALEMIA: ICD-10-CM

## 2024-09-17 DIAGNOSIS — K76.0 NONALCOHOLIC FATTY LIVER DISEASE: ICD-10-CM

## 2024-09-17 DIAGNOSIS — Z00.00 WELL ADULT EXAM: Primary | ICD-10-CM

## 2024-09-17 DIAGNOSIS — I10 PRIMARY HYPERTENSION: ICD-10-CM

## 2024-09-17 DIAGNOSIS — K21.9 GASTROESOPHAGEAL REFLUX DISEASE, UNSPECIFIED WHETHER ESOPHAGITIS PRESENT: ICD-10-CM

## 2024-09-17 DIAGNOSIS — H10.13 ALLERGIC CONJUNCTIVITIS OF BOTH EYES: ICD-10-CM

## 2024-09-17 DIAGNOSIS — E66.01 MORBID OBESITY WITH BMI OF 45.0-49.9, ADULT (HCC): ICD-10-CM

## 2024-09-17 DIAGNOSIS — J45.20 MILD INTERMITTENT ASTHMA WITHOUT COMPLICATION: ICD-10-CM

## 2024-09-17 PROCEDURE — 99214 OFFICE O/P EST MOD 30 MIN: CPT | Performed by: PHYSICIAN ASSISTANT

## 2024-09-17 PROCEDURE — 99395 PREV VISIT EST AGE 18-39: CPT | Performed by: PHYSICIAN ASSISTANT

## 2024-09-17 RX ORDER — HYDROCHLOROTHIAZIDE 25 MG/1
25 TABLET ORAL DAILY
Qty: 90 TABLET | Refills: 0 | Status: SHIPPED | OUTPATIENT
Start: 2024-09-17

## 2024-09-17 RX ORDER — KETOTIFEN FUMARATE 0.35 MG/ML
1 SOLUTION/ DROPS OPHTHALMIC 2 TIMES DAILY
Qty: 5 ML | Refills: 0 | Status: SHIPPED | OUTPATIENT
Start: 2024-09-17

## 2024-09-17 RX ORDER — CLONIDINE HYDROCHLORIDE 0.3 MG/1
0.3 TABLET ORAL DAILY
Qty: 90 TABLET | Refills: 0 | Status: SHIPPED | OUTPATIENT
Start: 2024-09-17

## 2024-09-17 RX ORDER — MONTELUKAST SODIUM 10 MG/1
10 TABLET ORAL DAILY
Qty: 90 TABLET | Refills: 0 | Status: SHIPPED | OUTPATIENT
Start: 2024-09-17

## 2024-09-17 RX ORDER — FLUTICASONE PROPIONATE AND SALMETEROL 250; 50 UG/1; UG/1
1 POWDER RESPIRATORY (INHALATION) 2 TIMES DAILY
Qty: 60 BLISTER | Refills: 1 | Status: SHIPPED | OUTPATIENT
Start: 2024-09-17

## 2024-09-17 NOTE — ASSESSMENT & PLAN NOTE
-Continue Advair once daily since his symptoms are well-controlled.  He states he has not needed a rescue inhaler for quite a while  Orders:    Fluticasone-Salmeterol (Advair) 250-50 mcg/dose inhaler; Inhale 1 puff 2 (two) times a day Rinse mouth after use.

## 2024-09-17 NOTE — PROGRESS NOTES
Ambulatory Visit  Name: Christal Mendenhall      : 2000      MRN: [id number]  Encounter Provider: Kim Farias PA-C  Encounter Date: 2024   Encounter department: [facility]    Assessment & Plan  Well adult exam  -I did recommend he get the flu vaccine in October or November at the pharmacy or he can schedule a nurse visit here  - I did recommend he walk briskly 10 minutes a day to increase his activity to help enhance weight loss  - He will continue to watch his diet and continue to eat healthy foods to help with weight loss.  He has lost another 24 pounds since .  - Annual physical in 1 year, routine follow-up in 3 months       Morbid obesity with BMI of 45.0-49.9, adult ([facility])  -Increase activity by walking briskly 10 minutes a day  - Continue to make healthy food choices  - Continue to avoid any caloric beverages       Primary hypertension  -Continue hydrochlorothiazide and amlodipine  Orders:    hydroCHLOROthiazide 25 mg tablet; Take 1 tablet (25 mg total) by mouth daily    Basic metabolic panel; Future    Mild intermittent asthma without complication  -Continue Advair once daily since his symptoms are well-controlled.  He states he has not needed a rescue inhaler for quite a while  Orders:    Fluticasone-Salmeterol (Advair) 250-50 mcg/dose inhaler; Inhale 1 puff 2 (two) times a day Rinse mouth after use.    Gastroesophageal reflux disease, unspecified whether esophagitis present  -Continue famotidine 20 mg daily.  - Advised to avoid caffeine products since he did notice some reflux symptoms today after drinking coffee       Nonalcoholic fatty liver disease  -Recent liver enzymes are normal  - Continue with weight loss and also start exercising       Prediabetes  -Recent blood sugar is normal       Other insomnia  -Continue clonidine 0.3 mg at bedtime  Orders:    cloNIDine (CATAPRES) 0.3 mg tablet; Take 1 tablet (0.3 mg total) by mouth daily    Allergic  conjunctivitis of both eyes  -Continue Zaditor as needed for allergies  Orders:    Ketotifen Fumarate (ZADITOR) 0.035 % ophthalmic solution; Administer 1 drop to both eyes 2 (two) times a day    Seasonal allergic rhinitis due to other allergic trigger    Orders:    montelukast (SINGULAIR) 10 mg tablet; Take 1 tablet (10 mg total) by mouth daily    Hypokalemia  -Recent CMP reveals a potassium level at 3.4  - I did give him a diet with potassium rich foods to eat 1 at least daily  - Recheck BMP in 3 months  Orders:    Basic metabolic panel; Future    M*Modal software was used to dictate this note. It may contain errors with dictating incorrect words/spelling. Please contact provider directly for any questions.        History of Present Illness     Patient presents today for annual physical along with his routine follow-up.  He did have recent blood work and he would like to review the results.  He does not have any concerns at this time.    Wellness:  -He does see the dentist at least twice a year  - Denies any vision problems  - He states he has been eating a healthier diet.  He did not realize that he is continuing to lose weight.  He states the weight loss probably depends on his mood  -He does exercise but only occasionally  -He states he has not used marijuana over the last 3 weeks  -Denies any tobacco use or alcohol use    Routine:  -He states that his asthma has been under good control with utilizing Advair once a day  -He does get intermittent reflux symptoms.  He did get some burning in his chest when he did lay down on the exam table today.  He states he did drink coffee earlier today and had waffles.  Overall though he states that his symptoms are under control with the famotidine.  - He states that his sleep is under good control with the clonidine          Review of Systems   Constitutional: Negative.    Gastrointestinal:         As stated in HPI   Psychiatric/Behavioral:          Stated in HPI  "          Objective     /86 (BP Location: Left arm, Patient Position: Sitting, Cuff Size: Large)   Pulse 90   Temp 98 °F (36.7 °C) (Tympanic)   Resp 18   Ht 6' 3\" (1.905 m)   Wt (!) 165 kg (364 lb)   SpO2 97%   BMI 45.50 kg/m²     Physical Exam  Vitals reviewed.   Constitutional:       General: He is not in acute distress.     Appearance: Normal appearance. He is well-developed. He is not ill-appearing, toxic-appearing or diaphoretic.   HENT:      Head: Normocephalic and atraumatic.   Neck:      Thyroid: No thyromegaly.   Cardiovascular:      Rate and Rhythm: Normal rate and regular rhythm.      Heart sounds: Normal heart sounds. No murmur heard.  Pulmonary:      Effort: Pulmonary effort is normal. No respiratory distress.      Breath sounds: Normal breath sounds. No wheezing, rhonchi or rales.   Abdominal:      General: Bowel sounds are normal.      Palpations: Abdomen is soft. There is no mass.      Tenderness: There is no abdominal tenderness.   Musculoskeletal:         General: No deformity.      Cervical back: Neck supple.      Right lower leg: No edema.      Left lower leg: No edema.   Lymphadenopathy:      Cervical: No cervical adenopathy.   Skin:     General: Skin is warm.   Neurological:      General: No focal deficit present.      Mental Status: He is alert.   Psychiatric:         Mood and Affect: Mood normal.         Behavior: Behavior normal.         Thought Content: Thought content normal.         Judgment: Judgment normal.         "

## 2024-09-17 NOTE — ASSESSMENT & PLAN NOTE
-Continue clonidine 0.3 mg at bedtime  Orders:    cloNIDine (CATAPRES) 0.3 mg tablet; Take 1 tablet (0.3 mg total) by mouth daily

## 2024-09-17 NOTE — ASSESSMENT & PLAN NOTE
-Continue hydrochlorothiazide and amlodipine  Orders:    hydroCHLOROthiazide 25 mg tablet; Take 1 tablet (25 mg total) by mouth daily    Basic metabolic panel; Future

## 2024-09-17 NOTE — ASSESSMENT & PLAN NOTE
-Increase activity by walking briskly 10 minutes a day  - Continue to make healthy food choices  - Continue to avoid any caloric beverages

## 2024-09-17 NOTE — ASSESSMENT & PLAN NOTE
-Continue famotidine 20 mg daily.  - Advised to avoid caffeine products since he did notice some reflux symptoms today after drinking coffee

## 2024-10-02 LAB
ALDOST SERPL-MCNC: 9.6 NG/DL (ref 0–30)
ALDOST/RENIN PLAS-RTO: 0.7 {RATIO} (ref 0–30)
RENIN PLAS-CCNC: 14.12 NG/ML/HR (ref 0.17–5.38)

## 2024-10-03 ENCOUNTER — HOSPITAL ENCOUNTER (OUTPATIENT)
Dept: NON INVASIVE DIAGNOSTICS | Facility: CLINIC | Age: 24
Discharge: HOME | End: 2024-10-03
Payer: COMMERCIAL

## 2024-10-03 VITALS
WEIGHT: 315 LBS | DIASTOLIC BLOOD PRESSURE: 80 MMHG | BODY MASS INDEX: 39.17 KG/M2 | HEIGHT: 75 IN | OXYGEN SATURATION: 99 % | HEART RATE: 117 BPM | SYSTOLIC BLOOD PRESSURE: 130 MMHG

## 2024-10-03 DIAGNOSIS — R07.2 PRECORDIAL CHEST PAIN: ICD-10-CM

## 2024-10-03 LAB
MAX HR PERCENT: 97 %
MAX HR: 190 BPM
RATE PRESSURE PRODUCT: NORMAL
SL CV STRESS RECOVERY BP: NORMAL MMHG
SL CV STRESS RECOVERY HR: 121 BPM
SL CV STRESS RECOVERY O2 SAT: 97 %
SL CV STRESS STAGE REACHED: 3
STRESS ANGINA INDEX: 1
STRESS BASELINE BP: NORMAL MMHG
STRESS BASELINE HR: 117 BPM
STRESS O2 SAT REST: 99 %
STRESS PEAK HR: 187 BPM
STRESS POST ESTIMATED WORKLOAD: 8.4 METS
STRESS POST EXERCISE DUR MIN: 6 MIN
STRESS POST EXERCISE DUR SEC: 29 SEC
STRESS POST O2 SAT PEAK: 97 %
STRESS POST PEAK BP: 160 MMHG

## 2024-10-03 PROCEDURE — 93018 CV STRESS TEST I&R ONLY: CPT | Performed by: INTERNAL MEDICINE

## 2024-10-03 PROCEDURE — 93016 CV STRESS TEST SUPVJ ONLY: CPT | Performed by: INTERNAL MEDICINE

## 2024-10-03 PROCEDURE — 93017 CV STRESS TEST TRACING ONLY: CPT

## 2024-10-04 LAB
CHEST PAIN STATEMENT: NORMAL
MAX DIASTOLIC BP: 100 MMHG
MAX PREDICTED HEART RATE: 194 BPM
PROTOCOL NAME: NORMAL
REASON FOR TERMINATION: NORMAL
STRESS POST EXERCISE DUR MIN: 6 MIN
STRESS POST EXERCISE DUR SEC: 29 SEC
STRESS POST PEAK HR: 190 BPM
STRESS POST PEAK SYSTOLIC BP: 160 MMHG
TARGET HR FORMULA: NORMAL
TEST INDICATION: NORMAL

## 2024-10-14 ENCOUNTER — TELEPHONE (OUTPATIENT)
Dept: CARDIOLOGY CLINIC | Facility: CLINIC | Age: 24
End: 2024-10-14

## 2024-10-14 NOTE — TELEPHONE ENCOUNTER
----- Message from Aristides Trent DO sent at 10/13/2024  3:26 PM EDT -----  I have reviewed the patient's stress test and appears to be negative for evidence of severe blockage in the heart arteries.  This is great news.  Please reach out the patient and let them know this finding.  Please ensure the patient has follow-up appointment to discuss these results further.  Thank you.

## 2025-01-17 DIAGNOSIS — I10 PRIMARY HYPERTENSION: ICD-10-CM

## 2025-01-17 DIAGNOSIS — J30.89 SEASONAL ALLERGIC RHINITIS DUE TO OTHER ALLERGIC TRIGGER: ICD-10-CM

## 2025-01-17 RX ORDER — MONTELUKAST SODIUM 10 MG/1
10 TABLET ORAL DAILY
Qty: 90 TABLET | Refills: 1 | Status: SHIPPED | OUTPATIENT
Start: 2025-01-17

## 2025-01-17 RX ORDER — AMLODIPINE BESYLATE 10 MG/1
10 TABLET ORAL DAILY
Qty: 90 TABLET | Refills: 1 | Status: SHIPPED | OUTPATIENT
Start: 2025-01-17

## 2025-01-17 RX ORDER — HYDROCHLOROTHIAZIDE 25 MG/1
25 TABLET ORAL DAILY
Qty: 90 TABLET | Refills: 0 | Status: SHIPPED | OUTPATIENT
Start: 2025-01-17

## 2025-03-05 ENCOUNTER — TELEPHONE (OUTPATIENT)
Dept: CARDIOLOGY CLINIC | Facility: CLINIC | Age: 25
End: 2025-03-05

## 2025-03-10 DIAGNOSIS — I10 PRIMARY HYPERTENSION: ICD-10-CM

## 2025-03-11 ENCOUNTER — TELEPHONE (OUTPATIENT)
Age: 25
End: 2025-03-11

## 2025-03-11 RX ORDER — HYDROCHLOROTHIAZIDE 25 MG/1
25 TABLET ORAL DAILY
Qty: 90 TABLET | Refills: 0 | OUTPATIENT
Start: 2025-03-11

## 2025-03-11 NOTE — TELEPHONE ENCOUNTER
I got a request for refill of his hydrochlorothiazide.  It looks like it was filled a month ago for 90 days.  I am not going to refill it at this time because he is due for his follow-up appointment for hypertension.  He was last seen 9/17/2025.  If he needs a temporary refill please let me know and I can fill it for 2 weeks.  Thank you

## 2025-03-17 ENCOUNTER — OFFICE VISIT (OUTPATIENT)
Age: 25
End: 2025-03-17
Payer: COMMERCIAL

## 2025-03-17 VITALS
SYSTOLIC BLOOD PRESSURE: 120 MMHG | DIASTOLIC BLOOD PRESSURE: 80 MMHG | HEIGHT: 75 IN | WEIGHT: 315 LBS | HEART RATE: 86 BPM | OXYGEN SATURATION: 97 % | BODY MASS INDEX: 39.17 KG/M2

## 2025-03-17 DIAGNOSIS — K58.0 IRRITABLE BOWEL SYNDROME WITH DIARRHEA: ICD-10-CM

## 2025-03-17 DIAGNOSIS — E66.01 MORBID OBESITY WITH BMI OF 45.0-49.9, ADULT (HCC): ICD-10-CM

## 2025-03-17 DIAGNOSIS — R73.03 PREDIABETES: ICD-10-CM

## 2025-03-17 DIAGNOSIS — J45.20 MILD INTERMITTENT ASTHMA WITHOUT COMPLICATION: ICD-10-CM

## 2025-03-17 DIAGNOSIS — E55.9 VITAMIN D DEFICIENCY: ICD-10-CM

## 2025-03-17 DIAGNOSIS — I10 PRIMARY HYPERTENSION: Primary | ICD-10-CM

## 2025-03-17 DIAGNOSIS — K21.9 GASTROESOPHAGEAL REFLUX DISEASE, UNSPECIFIED WHETHER ESOPHAGITIS PRESENT: ICD-10-CM

## 2025-03-17 DIAGNOSIS — H10.13 ALLERGIC CONJUNCTIVITIS OF BOTH EYES: ICD-10-CM

## 2025-03-17 DIAGNOSIS — J30.89 SEASONAL ALLERGIC RHINITIS DUE TO OTHER ALLERGIC TRIGGER: ICD-10-CM

## 2025-03-17 PROCEDURE — 99214 OFFICE O/P EST MOD 30 MIN: CPT | Performed by: PHYSICIAN ASSISTANT

## 2025-03-17 RX ORDER — CETIRIZINE HYDROCHLORIDE 10 MG/1
10 TABLET ORAL DAILY
Qty: 90 TABLET | Refills: 1 | Status: SHIPPED | OUTPATIENT
Start: 2025-03-17

## 2025-03-17 RX ORDER — KETOTIFEN FUMARATE 0.35 MG/ML
1 SOLUTION/ DROPS OPHTHALMIC 2 TIMES DAILY
Qty: 5 ML | Refills: 0 | Status: SHIPPED | OUTPATIENT
Start: 2025-03-17

## 2025-03-17 RX ORDER — DICYCLOMINE HCL 20 MG
20 TABLET ORAL 3 TIMES DAILY PRN
Qty: 90 TABLET | Refills: 0 | Status: SHIPPED | OUTPATIENT
Start: 2025-03-17

## 2025-03-17 RX ORDER — FAMOTIDINE 20 MG/1
20 TABLET, FILM COATED ORAL DAILY
Qty: 90 TABLET | Refills: 1 | Status: SHIPPED | OUTPATIENT
Start: 2025-03-17

## 2025-03-17 RX ORDER — HYDROCHLOROTHIAZIDE 25 MG/1
25 TABLET ORAL DAILY
Qty: 90 TABLET | Refills: 0 | Status: SHIPPED | OUTPATIENT
Start: 2025-03-17

## 2025-03-17 NOTE — ASSESSMENT & PLAN NOTE
- Refill of Bentyl ordered has been effective for his symptoms.  Orders:    Comprehensive metabolic panel    Hemoglobin A1C    dicyclomine (BENTYL) 20 mg tablet; Take 1 tablet (20 mg total) by mouth 3 (three) times a day as needed (abdominal pain and diarrhea)

## 2025-03-17 NOTE — ASSESSMENT & PLAN NOTE
- No allergy complaints at this time.  - Refill of Zyrtec ordered.   Orders:    Comprehensive metabolic panel    Hemoglobin A1C    cetirizine (ZyrTEC) 10 mg tablet; Take 1 tablet (10 mg total) by mouth daily

## 2025-03-17 NOTE — ASSESSMENT & PLAN NOTE
- Refill of Vitamin D3 ordered.   Orders:    Comprehensive metabolic panel    Hemoglobin A1C    Cholecalciferol (Vitamin D-3) 125 MCG (5000 UT) TABS; Take 5,000 Units by mouth daily

## 2025-03-17 NOTE — ASSESSMENT & PLAN NOTE
- Patient endorsed that a medical provider when he was incarcerated advised him to always take 2 of his HCTZ daily.   - Ran out of HCTZ a few weeks ago  - New prescription given   Emphasized to take only 1 tablet of HCTZ 25 mg daily.   - Continue amlodipine as prescribed.   - CMP ordered.   -Recommend follow-up in 5 months  Orders:    Comprehensive metabolic panel    Hemoglobin A1C    hydroCHLOROthiazide 25 mg tablet; Take 1 tablet (25 mg total) by mouth daily

## 2025-03-17 NOTE — ASSESSMENT & PLAN NOTE
- Patient taking Advair PRN and Singulair daily.  Orders:    Comprehensive metabolic panel    Hemoglobin A1C

## 2025-03-17 NOTE — ASSESSMENT & PLAN NOTE
- Patient complains of occasional nausea and heartburn  - Emphasized to take his famotidine 20 mg once daily 30 minutes prior to eating for greatest effect.   - Recommended to alter diet and avoid spicy or acidic foods.  Orders:    Comprehensive metabolic panel    Hemoglobin A1C    famotidine (PEPCID) 20 mg tablet; Take 1 tablet (20 mg total) by mouth daily

## 2025-03-17 NOTE — ASSESSMENT & PLAN NOTE
- Patient gained 8 lbs since last visit in 9/2024  - Advised to follow a well-balanced diet.  - Encouraged more physical activity.   - Hemoglobin A1C ordered.  Orders:    Comprehensive metabolic panel    Hemoglobin A1C

## 2025-03-17 NOTE — ASSESSMENT & PLAN NOTE
- Hemoglobin A1C ordered.  -Further treatment pending results  Orders:    Comprehensive metabolic panel    Hemoglobin A1C

## 2025-03-17 NOTE — PROGRESS NOTES
Name: Christal Mendenhall      : 2000      MRN: [id number]  Encounter Provider: Kim Farias PA-C  Encounter Date: 3/17/2025   Encounter department: [facility]  :  Assessment & Plan  Primary hypertension  - Patient endorsed that a medical provider when he was incarcerated advised him to always take 2 of his HCTZ daily.   - Ran out of HCTZ a few weeks ago  - New prescription given   Emphasized to take only 1 tablet of HCTZ 25 mg daily.   - Continue amlodipine as prescribed.   - CMP ordered.   -Recommend follow-up in 5 months  Orders:    Comprehensive metabolic panel    Hemoglobin A1C    hydroCHLOROthiazide 25 mg tablet; Take 1 tablet (25 mg total) by mouth daily    Mild intermittent asthma without complication  - Patient taking Advair PRN and Singulair daily.  Orders:    Comprehensive metabolic panel    Hemoglobin A1C    Gastroesophageal reflux disease, unspecified whether esophagitis present  - Patient complains of occasional nausea and heartburn  - Emphasized to take his famotidine 20 mg once daily 30 minutes prior to eating for greatest effect.   - Recommended to alter diet and avoid spicy or acidic foods.  Orders:    Comprehensive metabolic panel    Hemoglobin A1C    famotidine (PEPCID) 20 mg tablet; Take 1 tablet (20 mg total) by mouth daily    Morbid obesity with BMI of 45.0-49.9, adult (HCC)  - Patient gained 8 lbs since last visit in 2024  - Advised to follow a well-balanced diet.  - Encouraged more physical activity.   - Hemoglobin A1C ordered.  Orders:    Comprehensive metabolic panel    Hemoglobin A1C    Prediabetes  - Hemoglobin A1C ordered.  -Further treatment pending results  Orders:    Comprehensive metabolic panel    Hemoglobin A1C    Allergic conjunctivitis of both eyes  - No allergy complaints at this time.  - Refill of Zaditor ordered since this spring season will soon be starting for him to start the medication  Orders:    Comprehensive metabolic panel     Hemoglobin A1C    Ketotifen Fumarate (ZADITOR) 0.035 % ophthalmic solution; Administer 1 drop to both eyes 2 (two) times a day    Seasonal allergic rhinitis due to other allergic trigger  - No allergy complaints at this time.  - Refill of Zyrtec ordered.   Orders:    Comprehensive metabolic panel    Hemoglobin A1C    cetirizine (ZyrTEC) 10 mg tablet; Take 1 tablet (10 mg total) by mouth daily    Vitamin D deficiency  - Refill of Vitamin D3 ordered.   Orders:    Comprehensive metabolic panel    Hemoglobin A1C    Cholecalciferol (Vitamin D-3) 125 MCG (5000 UT) TABS; Take 5,000 Units by mouth daily    Irritable bowel syndrome with diarrhea  - Refill of Bentyl ordered has been effective for his symptoms.  Orders:    Comprehensive metabolic panel    Hemoglobin A1C    dicyclomine (BENTYL) 20 mg tablet; Take 1 tablet (20 mg total) by mouth 3 (three) times a day as needed (abdominal pain and diarrhea)    Patient was also seen and evaluated by Heriberto SPARKS Student under my direct visualization.     AIT software was used to dictate this note. It may contain errors with dictating incorrect words/spelling. Please contact provider directly for any questions.        History of Present Illness   Patient presents today for follow-up appointment. Patient states he has had some occasional reflux sx including nausea and heartburn, but otherwise has no complaints.  He states that he has been eating more spicy foods lately.  The spicy foods do aggravate his reflux.  Denies any chest pain, shortness of breath or palpitations. Endorses that he takes his famotidine PRN with food.   In terms of his weight management, patient reports that he has not been eating as healthy and exercises on average twice a week. Patient is requesting medication refills. States his HTN, Asthma, Allergies, and IBS have been well controlled.   States he has been taking his hydrochlorothiazide 25 mg twice daily because this is the way it was prescribed  "when he was incarcerated.  He did not realize that I was prescribing it once daily.  He has been out of the medication for several weeks.      Review of Systems   Constitutional:  Negative for chills and fever.   HENT:  Negative for sore throat.    Respiratory:  Negative for cough, chest tightness and shortness of breath.    Cardiovascular:  Negative for chest pain.   Gastrointestinal:  Negative for abdominal pain, diarrhea and vomiting.   Neurological:  Negative for headaches.   All other systems reviewed and are negative.      Objective   /80   Pulse 86   Ht 6' 3\" (1.905 m)   Wt (!) 169 kg (372 lb)   SpO2 97%   BMI 46.50 kg/m²      Physical Exam  Vitals and nursing note reviewed.   Constitutional:       General: He is not in acute distress.     Appearance: He is well-developed. He is obese. He is not toxic-appearing.   HENT:      Head: Normocephalic and atraumatic.   Cardiovascular:      Rate and Rhythm: Normal rate and regular rhythm.      Heart sounds: Normal heart sounds. No murmur heard.  Pulmonary:      Effort: Pulmonary effort is normal. No respiratory distress.      Breath sounds: Normal breath sounds. No wheezing.   Abdominal:      General: Bowel sounds are normal.      Palpations: Abdomen is soft.      Tenderness: There is no abdominal tenderness.   Musculoskeletal:         General: No swelling.      Cervical back: Neck supple.   Lymphadenopathy:      Cervical: No cervical adenopathy.   Skin:     General: Skin is warm.   Neurological:      General: No focal deficit present.      Mental Status: He is alert.   Psychiatric:         Mood and Affect: Mood normal.         Behavior: Behavior normal.         Thought Content: Thought content normal.         Judgment: Judgment normal.         "

## 2025-04-03 ENCOUNTER — APPOINTMENT (OUTPATIENT)
Dept: LAB | Facility: HOSPITAL | Age: 25
End: 2025-04-03
Payer: COMMERCIAL

## 2025-04-03 DIAGNOSIS — E87.6 HYPOKALEMIA: ICD-10-CM

## 2025-04-03 DIAGNOSIS — I10 PRIMARY HYPERTENSION: ICD-10-CM

## 2025-04-03 LAB
ANION GAP SERPL CALCULATED.3IONS-SCNC: 11 MMOL/L (ref 4–13)
BUN SERPL-MCNC: 11 MG/DL (ref 5–25)
CALCIUM SERPL-MCNC: 10.3 MG/DL (ref 8.4–10.2)
CHLORIDE SERPL-SCNC: 103 MMOL/L (ref 96–108)
CO2 SERPL-SCNC: 25 MMOL/L (ref 21–32)
CREAT SERPL-MCNC: 0.84 MG/DL (ref 0.6–1.3)
EST. AVERAGE GLUCOSE BLD GHB EST-MCNC: 111 MG/DL
GFR SERPL CREATININE-BSD FRML MDRD: 120 ML/MIN/1.73SQ M
GLUCOSE SERPL-MCNC: 91 MG/DL (ref 65–140)
HBA1C MFR BLD: 5.5 %
POTASSIUM SERPL-SCNC: 3.5 MMOL/L (ref 3.5–5.3)
SODIUM SERPL-SCNC: 139 MMOL/L (ref 135–147)

## 2025-04-03 PROCEDURE — 36415 COLL VENOUS BLD VENIPUNCTURE: CPT

## 2025-04-03 PROCEDURE — 80048 BASIC METABOLIC PNL TOTAL CA: CPT

## 2025-04-04 ENCOUNTER — RESULTS FOLLOW-UP (OUTPATIENT)
Age: 25
End: 2025-04-04

## 2025-04-04 NOTE — RESULT ENCOUNTER NOTE
Please let him know that his A1c is currently at 5.5 which is normal for his blood sugar levels.  Kidney function panel and potassium is also good.  Thank you

## 2025-08-05 DIAGNOSIS — E55.9 VITAMIN D DEFICIENCY: ICD-10-CM

## 2025-08-08 ENCOUNTER — OFFICE VISIT (OUTPATIENT)
Age: 25
End: 2025-08-08
Payer: COMMERCIAL

## 2025-08-08 VITALS
BODY MASS INDEX: 39.17 KG/M2 | DIASTOLIC BLOOD PRESSURE: 80 MMHG | HEIGHT: 75 IN | TEMPERATURE: 97.9 F | WEIGHT: 315 LBS | SYSTOLIC BLOOD PRESSURE: 120 MMHG | OXYGEN SATURATION: 98 % | HEART RATE: 84 BPM

## 2025-08-08 DIAGNOSIS — R73.03 PREDIABETES: ICD-10-CM

## 2025-08-08 DIAGNOSIS — G47.09 OTHER INSOMNIA: ICD-10-CM

## 2025-08-08 DIAGNOSIS — I10 PRIMARY HYPERTENSION: Primary | ICD-10-CM

## 2025-08-08 DIAGNOSIS — K21.9 GASTROESOPHAGEAL REFLUX DISEASE, UNSPECIFIED WHETHER ESOPHAGITIS PRESENT: ICD-10-CM

## 2025-08-08 DIAGNOSIS — R10.11 RIGHT UPPER QUADRANT PAIN: ICD-10-CM

## 2025-08-08 DIAGNOSIS — H10.13 ALLERGIC CONJUNCTIVITIS OF BOTH EYES: ICD-10-CM

## 2025-08-08 PROCEDURE — 99214 OFFICE O/P EST MOD 30 MIN: CPT | Performed by: PHYSICIAN ASSISTANT

## 2025-08-08 RX ORDER — CLONIDINE HYDROCHLORIDE 0.3 MG/1
0.3 TABLET ORAL DAILY
Qty: 90 TABLET | Refills: 0 | Status: SHIPPED | OUTPATIENT
Start: 2025-08-08

## 2025-08-08 RX ORDER — OMEPRAZOLE 20 MG/1
20 CAPSULE, DELAYED RELEASE ORAL DAILY
Qty: 90 CAPSULE | Refills: 1 | Status: SHIPPED | OUTPATIENT
Start: 2025-08-08

## 2025-08-08 RX ORDER — FAMOTIDINE 20 MG/1
20 TABLET, FILM COATED ORAL DAILY
Qty: 90 TABLET | Refills: 1 | Status: SHIPPED | OUTPATIENT
Start: 2025-08-08

## 2025-08-08 RX ORDER — KETOTIFEN FUMARATE 0.35 MG/ML
1 SOLUTION/ DROPS OPHTHALMIC 2 TIMES DAILY
Qty: 5 ML | Refills: 0 | Status: SHIPPED | OUTPATIENT
Start: 2025-08-08